# Patient Record
Sex: FEMALE | Race: BLACK OR AFRICAN AMERICAN | Employment: OTHER | URBAN - METROPOLITAN AREA
[De-identification: names, ages, dates, MRNs, and addresses within clinical notes are randomized per-mention and may not be internally consistent; named-entity substitution may affect disease eponyms.]

---

## 2017-02-17 RX ORDER — ATORVASTATIN CALCIUM 10 MG/1
TABLET, FILM COATED ORAL
Qty: 90 TAB | Refills: 1 | Status: SHIPPED | OUTPATIENT
Start: 2017-02-17 | End: 2017-11-20 | Stop reason: SDUPTHER

## 2017-03-17 ENCOUNTER — TELEPHONE (OUTPATIENT)
Dept: INTERNAL MEDICINE CLINIC | Age: 72
End: 2017-03-17

## 2017-03-17 NOTE — TELEPHONE ENCOUNTER
Attempted to reach patient regarding whether she had flu shot this season or declined to receive it. Requested she call back with information.

## 2017-03-23 ENCOUNTER — OFFICE VISIT (OUTPATIENT)
Dept: CARDIOLOGY CLINIC | Age: 72
End: 2017-03-23

## 2017-03-23 VITALS
OXYGEN SATURATION: 98 % | SYSTOLIC BLOOD PRESSURE: 120 MMHG | DIASTOLIC BLOOD PRESSURE: 80 MMHG | HEIGHT: 67 IN | RESPIRATION RATE: 16 BRPM | HEART RATE: 96 BPM | WEIGHT: 182 LBS | BODY MASS INDEX: 28.56 KG/M2

## 2017-03-23 DIAGNOSIS — I05.9 MITRAL VALVE DISORDER: ICD-10-CM

## 2017-03-23 DIAGNOSIS — I10 ESSENTIAL HYPERTENSION, BENIGN: ICD-10-CM

## 2017-03-23 DIAGNOSIS — I27.20 MILD PULMONARY HYPERTENSION (HCC): ICD-10-CM

## 2017-03-23 DIAGNOSIS — I42.2 APICAL VARIANT HYPERTROPHIC CARDIOMYOPATHY (HCC): Primary | ICD-10-CM

## 2017-03-23 DIAGNOSIS — I50.32 CHRONIC DIASTOLIC HEART FAILURE (HCC): ICD-10-CM

## 2017-03-23 DIAGNOSIS — I48.19 PERSISTENT ATRIAL FIBRILLATION (HCC): ICD-10-CM

## 2017-03-23 RX ORDER — CARVEDILOL 6.25 MG/1
6.25 TABLET ORAL 2 TIMES DAILY WITH MEALS
Qty: 60 TAB | Refills: 2 | Status: SHIPPED | OUTPATIENT
Start: 2017-03-23 | End: 2017-06-25 | Stop reason: SDUPTHER

## 2017-03-23 NOTE — PROGRESS NOTES
Destiny Lin     1945       Vipmaury Hebert MD, [facility] [place]  Date of Visit-3/23/2017   PCP is Sherie Canela MD   [facility] [place]  [facility]  HPI:  Destiny Lin is a 66 y.o. female     Follow up of apical variant hypertrophic CM and persistent AF. She is doing well currently. She reports occasional brief episodes of elevated heart beats with accompanying weakness. These occur approximately twice per month and only last for a brief moment. Denies chest pain, edema, syncope or shortness of breath at rest, palpitations or sense of arrhythmia. EKG:     Assessment/Plan:       Apical hypertrophy  -previous MRI   -probably would benefit from BB    Persistent AF  -with some breakthrough episodes of tachycardia   -amiodarone did not work  -continue [facility]  -low dose digoxin because of HCM  -because of the tachycardia will coreg 6.25 mg BID    diastolic dysfunction congestive heart failure chronic Patient seems to be NYHA Class 1 ( Ordinary physical activity does not cause undue fatigue, dyspnea, palpitations or anginal pain.)   HTN at goal    PLAN:  Start Carvedilol 6.25 mg twice daily  Follow up with Dr. Kamille Hebert in 1 month with an echocardiogram same day  Future Appointments  Date Time Provider Franchesca Dawni   4/27/2017 1:00 PM [facility] [address] [facility]   4/27/2017 1:20 PM Ramesh Mistry  [address]   5/17/2017 2:30 PM Sherie Canela MD [address] [facility]   11/13/2017 2:40 PM Dorie Schlatter, MD [facility] KYLE SCHED       Impression:   1. Apical variant hypertrophic cardiomyopathy ([address]    2. Chronic diastolic heart failure (HCC)    3. Persistent atrial fibrillation ([address]    4. Essential hypertension, benign    5. Mitral valve disorder    6.  Mild pulmonary hypertension (HCC)       Cardiac History:   Chronic atrial fibrillation   Diastolic CHF    ECHO 0-29-81= ef 55-60%, biatrial enlargement  ECHO 3- EF 70%, AK of apex with apical hypertrophy, mild TR,LAE, Pulm Htn    Admit Jan 2012 with asthma and May 2013; Chronic airway dz    Cardiac MRI     1. Apical hypertrophic cardiomyopathy. The apical septal wall measures 20   mm. The apical lateral and mid inferolateral wall measures 26 mm. Complete   obliteration of the LV apical cavity. [place] like shape of the LV cavity   typical of the apical hypertrophic cardiomyopathy. Hyperdynamic left   ventricular systolic function with end-systolic cavity obliteration. 3-D   LVEF 86%. There is no systolic anterior motion of the mitral apparatus or no   LV outflow tract obstruction. 2. Normal right ventricular size and systolic function. RVEF 60%. 3. Mild 1+ mitral regurgitation. 4. Moderate 2+ tricuspid regurgitation. 5. Normal rest myocardial perfusion on first pass perfusion imaging. 6. On EGE and LGE imaging, there is no areas myocardial enhancement to   suggest any myocardial scar, recent or old myocardial infarction,   infiltration or inflammation. There is no features of myocardial sarcoidosis   or amyloidosis. There is no features of inflammatory cardiomyopathy such as   myocarditis. 7. Normal pleura and pericardium. Trace anterior and posterior pericardial   effusion. 8. Markedly dilated RA and LA. ROS-except as noted above. . A complete cardiac and respiratory are reviewed and negative except as above ; Resp-denies wheezing  or productive cough,.  Const- No unusual weight loss or fever; Neuro-no recent seizure or CVA ; GI- No BRBPR, abdom pain, bloating ; - no  hematuria   see supplement sheet, initialed and to be scanned by staff  Past Medical History:   Diagnosis Date    Apical variant hypertrophic cardiomyopathy ([address]     Chronic diastolic heart failure ([address]     HTN (hypertension)     Hx of mammogram     LVH (left ventricular hypertrophy) due to hypertensive disease     Mild pulmonary hypertension ([address] 9/13/2012    Obstructive sleep apnea (adult) (pediatric) 07-    AHI: 5.0 per hour    Pneumonia 2013 admit [address] Hx= reports that she quit smoking about 21 years ago. She has a 2.00 pack-year smoking history. She has never used smokeless tobacco. She reports that she does not drink alcohol or use illicit drugs. Exam and Labs:    Visit Vitals    /80 (BP 1 Location: Left arm, BP Patient Position: Sitting)    Pulse 96    Resp 16    Ht 5' 7\" (1.702 m)    Wt 182 lb (82.6 kg)    SpO2 98%    BMI 28.51 kg/m2      Constitutional:  NAD, comfortable  Head: NC,AT. Eyes: No scleral icterus. Neck:  Neck supple. No JVD present. Throat: moist mucous membranes. Chest: Effort normal & normal respiratory excursion . Neurological: alert, conversant and oriented . Skin: Skin is not cold. No obvious systemic rash noted. Not diaphoretic. No erythema. Psychiatric:  Grossly normal mood and affect. Behavior appears normal. Extremities:  no clubbing or cyanosis. Abdomen: non distended    Lungs:breath sounds normal. No stridor. distress, wheezes or  Rales. Heart:    Irregularly irregular, normal S1, S2, no murmurs, rubs, clicks or gallops , PMI non displaced. Edema: Edema is none. Lab Results   Component Value Date/Time    Cholesterol, total 111 05/27/2016 09:33 AM    HDL Cholesterol 38 05/27/2016 09:33 AM    LDL, calculated 56 05/27/2016 09:33 AM    Triglyceride 85 05/27/2016 09:33 AM    CHOL/HDL Ratio 5.1 05/12/2013 04:20 AM     No results found for this or any previous visit.    Lab Results   Component Value Date/Time    Sodium 143 11/09/2016 01:15 PM    Potassium 4.0 11/09/2016 01:15 PM    Chloride 100 11/09/2016 01:15 PM    CO2 28 11/09/2016 01:15 PM    Anion gap 6 05/15/2013 07:10 AM    Glucose 99 11/09/2016 01:15 PM    BUN 15 11/09/2016 01:15 PM    Creatinine 0.67 11/09/2016 01:15 PM    BUN/Creatinine ratio 22 11/09/2016 01:15 PM    GFR est  11/09/2016 01:15 PM    GFR est non-AA 87 11/09/2016 01:15 PM    Calcium 9.5 11/09/2016 01:15 PM      Wt Readings from Last 3 Encounters:   03/23/17 182 lb (82.6 kg)   11/18/16 179 lb (81.2 kg)   11/10/16 179 lb (81.2 kg)      BP Readings from Last 3 Encounters:   03/23/17 120/80   11/18/16 124/88   11/10/16 124/82        Current Outpatient Prescriptions   Medication Sig    atorvastatin (LIPITOR) 10 mg tablet TAKE 1 TAB BY MOUTH DAILY.  valsartan-hydrochlorothiazide (DIOVAN-HCT) 160-25 mg per tablet TAKE 1 TABLET BY MOUTH EVERY DAY    ELIQUIS 5 mg tablet TAKE 1 TAB BY MOUTH TWO (2) TIMES A DAY.  ADVAIR DISKUS 250-50 mcg/dose diskus inhaler INHALE 1 PUFF TWICE A DAY    digoxin (LANOXIN) 0.125 mg tablet TAKE 1 TABLET BY MOUTH EVERY DAY    albuterol-ipratropium (DUO-NEB) 2.5 mg-0.5 mg/3 ml nebu 3 mL by Nebulization route four (4) times daily. (Patient taking differently: 3 mL by Nebulization route as needed.)    albuterol (PROVENTIL HFA, VENTOLIN HFA, PROAIR HFA) 90 mcg/actuation inhaler Take 1 Puff by inhalation every six (6) hours as needed for Wheezing.  triamcinolone acetonide (KENALOG) 0.1 % topical cream APPLY A THIN LAYER TO AFFECTED AREA(S) AS DIRECTED TWICE A DAY    furosemide (LASIX) 20 mg tablet 1 tablet daily (Patient taking differently: 20 mg as needed. 1 tablet daily)    tiotropium (SPIRIVA) 18 mcg inhalation capsule Take 1 Cap by inhalation daily. (Patient taking differently: Take 1 Cap by inhalation as needed.)    aspirin 81 mg tablet Take 1 Tab by mouth daily.  cpap machine kit by Does Not Apply route nightly.  multivitamin (ONE A DAY) tablet Take 1 Tab by mouth daily. No current facility-administered medications for this visit. Impression see above.     Written by Bharati Guerra, as dictated by Ramesh Mistry MD.

## 2017-03-23 NOTE — MR AVS SNAPSHOT
Visit Information Date & Time Provider Department Dept. Phone Encounter #  
 3/23/2017  2:40 PM Ramesh Mistry MD [facility] [phone] [id number] Your Appointments 5/17/2017  2:30 PM  
PHYSICAL with Eliseo Doss MD  
[facility] [place] [address] Internal Medicine [address]) Appt Note: wellness [address] [facility] [address] Critical access hospital [address]  
[address] [place] [address]  
  
    
 11/13/2017  2:40 PM  
Any with [place]  
[address] [facility] ([address]) Appt Note: Yearly F/U, bring machine. [address] Critical access hospital [address] [facility]  
  
   
 [address] [id number] Upcoming Health Maintenance Date Due FOBT Q 1 YEAR, 18+ 6/8/1961 DTaP/Tdap/Td series (1 - Tdap) 6/8/1964 ZOSTER VACCINE AGE 60> 6/8/2003 INFLUENZA AGE 9 TO ADULT 8/1/2016 MEDICARE YEARLY EXAM 11/6/2016 BREAST CANCER SCRN MAMMOGRAM 11/17/2016 Pneumococcal 65+ Low/Medium Risk (2 of 2 - PPSV23) 1/27/2017 HEMOGLOBIN A1C Q6M 5/9/2017 LIPID PANEL Q1 5/27/2017 EYE EXAM RETINAL OR DILATED Q1 6/23/2017 FOOT EXAM Q1 11/9/2017 MICROALBUMIN Q1 11/9/2017 GLAUCOMA SCREENING Q2Y 6/23/2018 COLONOSCOPY 11/6/2025 Allergies as of 3/23/2017  Review Complete On: 3/23/2017 By: David Aquino Severity Noted Reaction Type Reactions Cetirizine Medium 10/18/2016    Hives Other reaction(s): Hives Amlodipine  01/03/2014   Side Effect Swelling  
 lower extremity swelling Coreg [Carvedilol]  04/29/2014   Side Effect Other (comments)  
 sweating Erythromycin  09/29/2010    Hives Verapamil Low 01/31/2012   Not Verified Other (comments) Patient unable to take verapamil for rate control secondary to peripheral edema/maliase/feeling of being high. Current Immunizations  Reviewed on 11/18/2016 Name Date Influenza High Dose Vaccine PF 11/6/2015 Influenza Vaccine Split 1/28/2012 12:03 AM, 3/31/2011  2:45 AM  
 Pneumococcal Vaccine (Unspecified Type) 1/27/2012 11:56 PM  
  
 Not reviewed this visit You Were Diagnosed With   
  
 Codes Comments Apical variant hypertrophic cardiomyopathy ([facility]    -  Primary ICD-10-CM: I42.2 ICD-9-CM: 425.4 Vitals BP Pulse Resp Height(growth percentile) Weight(growth percentile) SpO2  
 120/80 (BP 1 Location: Left arm, BP Patient Position: Sitting) 96 16 5' 7\" (1.702 m) 182 lb (82.6 kg) 98% BMI OB Status Smoking Status 28.51 kg/m2 Hysterectomy Former Smoker Vitals History BMI and BSA Data Body Mass Index Body Surface Area 28.51 kg/m 2 1.98 m 2 Preferred Pharmacy Pharmacy Name Phone [id number] [phone] Your Updated Medication List  
  
   
This list is accurate as of: 3/23/17  3:37 PM.  Always use your most recent med list.  
  
  
  
  
 Boyd Waite 250-50 mcg/dose diskus inhaler Generic drug:  fluticasone-salmeterol INHALE 1 PUFF TWICE A DAY  
  
 albuterol 90 mcg/actuation inhaler Commonly known as:  PROVENTIL HFA, VENTOLIN HFA, PROAIR HFA Take 1 Puff by inhalation every six (6) hours as needed for Wheezing. albuterol-ipratropium 2.5 mg-0.5 mg/3 ml Nebu Commonly known as:  DUO-NEB  
3 mL by Nebulization route four (4) times daily. aspirin 81 mg tablet Take 1 Tab by mouth daily. atorvastatin 10 mg tablet Commonly known as:  LIPITOR  
TAKE 1 TAB BY MOUTH DAILY. carvedilol 6.25 mg tablet Commonly known as:  Urban Shonna Take 1 Tab by mouth two (2) times daily (with meals). cpap machine kit  
by Does Not Apply route nightly. digoxin 0.125 mg tablet Commonly known as:  LANOXIN  
TAKE 1 TABLET BY MOUTH EVERY DAY  
  
 ELIQUIS 5 mg tablet Generic drug:  apixaban TAKE 1 TAB BY MOUTH TWO (2) TIMES A DAY. furosemide 20 mg tablet Commonly known as:  LASIX  
1 tablet daily  
  
 multivitamin tablet Commonly known as:  ONE A DAY Take 1 Tab by mouth daily. tiotropium 18 mcg inhalation capsule Commonly known as:  Roni Brady Take 1 Cap by inhalation daily. triamcinolone acetonide 0.1 % topical cream  
Commonly known as:  KENALOG  
APPLY A THIN LAYER TO AFFECTED AREA(S) AS DIRECTED TWICE A DAY  
  
 valsartan-hydroCHLOROthiazide 160-25 mg per tablet Commonly known as:  DIOVAN-HCT  
TAKE 1 TABLET BY MOUTH EVERY DAY Prescriptions Sent to Pharmacy Refills  
 carvedilol (COREG) 6.25 mg tablet 2 Sig: Take 1 Tab by mouth two (2) times daily (with meals). Class: Normal  
 Pharmacy: [address] [place] [address] #: [phone] Route: Oral  
  
To-Do List   
 03/23/2017 ECHO:  2D ECHO COMPLETE ADULT (TTE) W OR WO CONTR Patient Instructions Start Carvedilol 6.25 mg twice daily Follow up with Dr. Zoila Link in 1 month with an echocardiogram same day Introducing [facility]! Hien Martinez introduces [employer] patient portal. Now you can access parts of your medical record, email your doctor's office, and request medication refills online. 1. In your internet browser, go to [email or web address] 2. Click on the First Time User? Click Here link in the Sign In box. You will see the New Member Sign Up page. 3. Enter your [employer] Access Code exactly as it appears below. You will not need to use this code after youve completed the sign-up process. If you do not sign up before the expiration date, you must request a new code. · [employer] Access Code: [email or web address] Expires: 6/21/2017  3:37 PM 
 
4. Enter the last four digits of your Social Security Number (xxxx) and Date of Birth (mm/dd/yyyy) as indicated and click Submit. You will be taken to the next sign-up page. 5. Create a [employer] ID. This will be your [employer] login ID and cannot be changed, so think of one that is secure and easy to remember. 6. Create a [employer] password. You can change your password at any time. 7. Enter your Password Reset Question and Answer. This can be used at a later time if you forget your password. 8. Enter your e-mail address. You will receive e-mail notification when new information is available in [address] 9. Click Sign Up. You can now view and download portions of your medical record. 10. Click the Download Summary menu link to download a portable copy of your medical information. If you have questions, please visit the Frequently Asked Questions section of the [employer] website. Remember, [employer] is NOT to be used for urgent needs. For medical emergencies, dial 911. Now available from your iPhone and Android! Please provide this summary of care documentation to your next provider. Your primary care clinician is listed as Garett Bowles. If you have any questions after today's visit, please call [phone].

## 2017-03-23 NOTE — PATIENT INSTRUCTIONS
Start Carvedilol 6.25 mg twice daily    Follow up with Dr. Raymond Millering in 1 month with an echocardiogram same day

## 2017-03-28 RX ORDER — VALSARTAN AND HYDROCHLOROTHIAZIDE 160; 25 MG/1; MG/1
TABLET ORAL
Qty: 90 TAB | Refills: 1 | Status: SHIPPED | OUTPATIENT
Start: 2017-03-28 | End: 2017-10-07 | Stop reason: SDUPTHER

## 2017-04-26 RX ORDER — DIGOXIN 125 MCG
TABLET ORAL
Qty: 30 TAB | Refills: 9 | Status: SHIPPED | OUTPATIENT
Start: 2017-04-26 | End: 2018-03-01 | Stop reason: SDUPTHER

## 2017-04-27 ENCOUNTER — OFFICE VISIT (OUTPATIENT)
Dept: CARDIOLOGY CLINIC | Age: 72
End: 2017-04-27

## 2017-04-27 ENCOUNTER — CLINICAL SUPPORT (OUTPATIENT)
Dept: CARDIOLOGY CLINIC | Age: 72
End: 2017-04-27

## 2017-04-27 VITALS
WEIGHT: 184 LBS | SYSTOLIC BLOOD PRESSURE: 140 MMHG | HEIGHT: 67 IN | DIASTOLIC BLOOD PRESSURE: 90 MMHG | BODY MASS INDEX: 28.88 KG/M2

## 2017-04-27 DIAGNOSIS — I05.9 MITRAL VALVE DISORDER: ICD-10-CM

## 2017-04-27 DIAGNOSIS — I10 ESSENTIAL HYPERTENSION, BENIGN: ICD-10-CM

## 2017-04-27 DIAGNOSIS — I50.32 CHRONIC DIASTOLIC HEART FAILURE (HCC): ICD-10-CM

## 2017-04-27 DIAGNOSIS — I42.2 APICAL VARIANT HYPERTROPHIC CARDIOMYOPATHY (HCC): ICD-10-CM

## 2017-04-27 DIAGNOSIS — J41.0 SIMPLE CHRONIC BRONCHITIS (HCC): ICD-10-CM

## 2017-04-27 DIAGNOSIS — I27.20 MILD PULMONARY HYPERTENSION (HCC): ICD-10-CM

## 2017-04-27 DIAGNOSIS — I42.2 APICAL VARIANT HYPERTROPHIC CARDIOMYOPATHY (HCC): Primary | ICD-10-CM

## 2017-04-27 DIAGNOSIS — I48.19 PERSISTENT ATRIAL FIBRILLATION (HCC): ICD-10-CM

## 2017-04-27 NOTE — PROGRESS NOTES
Nikita Shipley     1945       Dejon Delaney MD, [facility] [place]  Date of Visit-4/27/2017   PCP is Christos Morales MD   [facility] [place]  [facility]  HPI:  Nikita Shipley is a 66 y.o. female   Follow up of apical  hypertrophy. Echo today shows good LV function at 70% on preliminary with apical hypertrophy unchanged. It is reviewed with the patient. Gets brief SOB. She has persistent AF. Started on carvedilol last visit a month ago. She takes her lasix rarely  Denies chest pain, edema, syncope or shortness of breath at rest, has no tachycardia, palpitations or sense of arrhythmia. Assessment/Plan:     Apical hypertrophy cardiomyopathy  -as seen on previous MRI and echo today   -started Coreg at last visit   -EF was 86% on MRI previously  -there was no BUSTER and no scar     Persistent AF  -no further tachycardia   -amiodarone did not work   -Coreg with low dose digoxin, is doing well  -continue oral anticoagulant to prevent stroke with the Eliquis    Mild diastolic dysfunction   -compensated on Lasix as needed   HTN  -at goal   Follow up in 6 months   Future Appointments  Date Time Provider Franchesca Gretchen   5/17/2017 2:30 PM Christos Morales MD [address] [facility]   5/19/2017 10:00 AM [facility] [address]   10/26/2017 1:20 PM Lady Zafar  [address]   11/13/2017 2:40 PM Angelina Cole MD [facility] KYLE SCHED       Impression:   1. Apical variant hypertrophic cardiomyopathy ([address]    2. Chronic diastolic heart failure (HCC)    3. Persistent atrial fibrillation ([address]    4. Essential hypertension, benign    5. Mitral valve disorder    6. Mild pulmonary hypertension ([address]    7.  Simple chronic bronchitis (HCC)       Cardiac History:   Chronic atrial fibrillation   Diastolic CHF    ECHO 5-40-60= ef 55-60%, biatrial enlargement  ECHO 3- EF 70%, AK of apex with apical hypertrophy, mild TR,LAE, Pulm Htn    Admit Jan 2012 with asthma and May 2013; Chronic airway dz    Cardiac MRI     1. Apical hypertrophic cardiomyopathy. The apical septal wall measures 20   mm. The apical lateral and mid inferolateral wall measures 26 mm. Complete   obliteration of the LV apical cavity. [place] like shape of the LV cavity   typical of the apical hypertrophic cardiomyopathy. Hyperdynamic left   ventricular systolic function with end-systolic cavity obliteration. 3-D   LVEF 86%. There is no systolic anterior motion of the mitral apparatus or no   LV outflow tract obstruction. 2. Normal right ventricular size and systolic function. RVEF 60%. 3. Mild 1+ mitral regurgitation. 4. Moderate 2+ tricuspid regurgitation. 5. Normal rest myocardial perfusion on first pass perfusion imaging. 6. On EGE and LGE imaging, there is no areas myocardial enhancement to   suggest any myocardial scar, recent or old myocardial infarction,   infiltration or inflammation. There is no features of myocardial sarcoidosis   or amyloidosis. There is no features of inflammatory cardiomyopathy such as   myocarditis. 7. Normal pleura and pericardium. Trace anterior and posterior pericardial   effusion. 8. Markedly dilated RA and LA. ROS-except as noted above. . A complete cardiac and respiratory are reviewed and negative except as above ; Resp-denies wheezing  or productive cough,.  Const- No unusual weight loss or fever; Neuro-no recent seizure or CVA ; GI- No BRBPR, abdom pain, bloating ; - no  hematuria   see supplement sheet, initialed and to be scanned by staff  Past Medical History:   Diagnosis Date    Apical variant hypertrophic cardiomyopathy ([address]     Chronic diastolic heart failure ([address]     HTN (hypertension)     Hx of mammogram     LVH (left ventricular hypertrophy) due to hypertensive disease     Mild pulmonary hypertension ([address] 9/13/2012    Obstructive sleep apnea (adult) (pediatric) 07-    AHI: 5.0 per hour    Pneumonia 2013 admit [facility]      Social Hx= reports that she quit smoking about 21 years ago. She has a 2.00 pack-year smoking history. She has never used smokeless tobacco. She reports that she does not drink alcohol or use illicit drugs. Exam and Labs:    Visit Vitals    /90    Ht 5' 7\" (1.702 m)    Wt 184 lb (83.5 kg)    BMI 28.82 kg/m2      Constitutional:  NAD, comfortable  Head: NC,AT. Eyes: No scleral icterus. Neck:  Neck supple. No JVD present. Throat: moist mucous membranes. Chest: Effort normal & normal respiratory excursion . Neurological: alert, conversant and oriented . Skin: Skin is not cold. No obvious systemic rash noted. Not diaphoretic. No erythema. Psychiatric:  Grossly normal mood and affect. Behavior appears normal. Extremities:  no clubbing or cyanosis. Abdomen: non distended    Lungs:breath sounds normal. No stridor. distress, wheezes or  Rales. Heart:    Irregularly irregular  normal S1, S2, no murmurs, rubs, clicks or gallops , PMI non displaced. Edema: Edema is none. Lab Results   Component Value Date/Time    Cholesterol, total 111 05/27/2016 09:33 AM    HDL Cholesterol 38 05/27/2016 09:33 AM    LDL, calculated 56 05/27/2016 09:33 AM    Triglyceride 85 05/27/2016 09:33 AM    CHOL/HDL Ratio 5.1 05/12/2013 04:20 AM     No results found for this or any previous visit.    Lab Results   Component Value Date/Time    Sodium 143 11/09/2016 01:15 PM    Potassium 4.0 11/09/2016 01:15 PM    Chloride 100 11/09/2016 01:15 PM    CO2 28 11/09/2016 01:15 PM    Anion gap 6 05/15/2013 07:10 AM    Glucose 99 11/09/2016 01:15 PM    BUN 15 11/09/2016 01:15 PM    Creatinine 0.67 11/09/2016 01:15 PM    BUN/Creatinine ratio 22 11/09/2016 01:15 PM    GFR est  11/09/2016 01:15 PM    GFR est non-AA 87 11/09/2016 01:15 PM    Calcium 9.5 11/09/2016 01:15 PM      Wt Readings from Last 3 Encounters:   04/27/17 184 lb (83.5 kg)   03/23/17 182 lb (82.6 kg)   11/18/16 179 lb (81.2 kg)      BP Readings from Last 3 Encounters:   04/27/17 140/90 03/23/17 120/80   11/18/16 124/88        Current Outpatient Prescriptions   Medication Sig    digoxin (LANOXIN) 0.125 mg tablet TAKE 1 TABLET BY MOUTH EVERY DAY    valsartan-hydroCHLOROthiazide (DIOVAN-HCT) 160-25 mg per tablet TAKE 1 TABLET BY MOUTH EVERY DAY    carvedilol (COREG) 6.25 mg tablet Take 1 Tab by mouth two (2) times daily (with meals).  atorvastatin (LIPITOR) 10 mg tablet TAKE 1 TAB BY MOUTH DAILY.  ELIQUIS 5 mg tablet TAKE 1 TAB BY MOUTH TWO (2) TIMES A DAY.  ADVAIR DISKUS 250-50 mcg/dose diskus inhaler INHALE 1 PUFF TWICE A DAY    albuterol-ipratropium (DUO-NEB) 2.5 mg-0.5 mg/3 ml nebu 3 mL by Nebulization route four (4) times daily. (Patient taking differently: 3 mL by Nebulization route as needed.)    albuterol (PROVENTIL HFA, VENTOLIN HFA, PROAIR HFA) 90 mcg/actuation inhaler Take 1 Puff by inhalation every six (6) hours as needed for Wheezing.  triamcinolone acetonide (KENALOG) 0.1 % topical cream APPLY A THIN LAYER TO AFFECTED AREA(S) AS DIRECTED TWICE A DAY    furosemide (LASIX) 20 mg tablet 1 tablet daily (Patient taking differently: 20 mg as needed. 1 tablet daily)    tiotropium (SPIRIVA) 18 mcg inhalation capsule Take 1 Cap by inhalation daily. (Patient taking differently: Take 1 Cap by inhalation as needed.)    aspirin 81 mg tablet Take 1 Tab by mouth daily.  cpap machine kit by Does Not Apply route nightly.  multivitamin (ONE A DAY) tablet Take 1 Tab by mouth daily. No current facility-administered medications for this visit. Impression see above.     Written by Mumtaz Arreguin, as dictated by Hernando Navarro MD.

## 2017-05-15 RX ORDER — APIXABAN 5 MG/1
TABLET, FILM COATED ORAL
Qty: 60 TAB | Refills: 5 | Status: SHIPPED | OUTPATIENT
Start: 2017-05-15 | End: 2017-12-21 | Stop reason: SDUPTHER

## 2017-05-17 ENCOUNTER — OFFICE VISIT (OUTPATIENT)
Dept: INTERNAL MEDICINE CLINIC | Age: 72
End: 2017-05-17

## 2017-05-17 VITALS
HEART RATE: 100 BPM | WEIGHT: 182 LBS | HEIGHT: 63 IN | OXYGEN SATURATION: 97 % | RESPIRATION RATE: 16 BRPM | TEMPERATURE: 98.1 F | BODY MASS INDEX: 32.25 KG/M2 | SYSTOLIC BLOOD PRESSURE: 120 MMHG | DIASTOLIC BLOOD PRESSURE: 76 MMHG

## 2017-05-17 DIAGNOSIS — K42.9 UMBILICAL HERNIA WITHOUT OBSTRUCTION AND WITHOUT GANGRENE: ICD-10-CM

## 2017-05-17 DIAGNOSIS — Z23 ENCOUNTER FOR IMMUNIZATION: ICD-10-CM

## 2017-05-17 DIAGNOSIS — Z00.00 MEDICARE ANNUAL WELLNESS VISIT, SUBSEQUENT: Primary | ICD-10-CM

## 2017-05-17 DIAGNOSIS — G47.33 OSA (OBSTRUCTIVE SLEEP APNEA): ICD-10-CM

## 2017-05-17 DIAGNOSIS — Z71.89 ACP (ADVANCE CARE PLANNING): ICD-10-CM

## 2017-05-17 DIAGNOSIS — J41.0 SIMPLE CHRONIC BRONCHITIS (HCC): ICD-10-CM

## 2017-05-17 DIAGNOSIS — E11.9 CONTROLLED TYPE 2 DIABETES MELLITUS WITHOUT COMPLICATION, WITHOUT LONG-TERM CURRENT USE OF INSULIN (HCC): ICD-10-CM

## 2017-05-17 DIAGNOSIS — I48.19 PERSISTENT ATRIAL FIBRILLATION (HCC): ICD-10-CM

## 2017-05-17 DIAGNOSIS — Z13.39 SCREENING FOR ALCOHOLISM: ICD-10-CM

## 2017-05-17 RX ORDER — TRIAMCINOLONE ACETONIDE 1 MG/G
CREAM TOPICAL
Qty: 30 G | Refills: 0 | Status: SHIPPED | OUTPATIENT
Start: 2017-05-17

## 2017-05-17 NOTE — PATIENT INSTRUCTIONS
Preventing Falls: Care Instructions  Your Care Instructions  Getting around your home safely can be a challenge if you have injuries or health problems that make it easy for you to fall. Loose rugs and furniture in walkways are among the dangers for many older people who have problems walking or who have poor eyesight. People who have conditions such as arthritis, osteoporosis, or dementia also have to be careful not to fall. You can make your home safer with a few simple measures. Follow-up care is a key part of your treatment and safety. Be sure to make and go to all appointments, and call your doctor if you are having problems. It's also a good idea to know your test results and keep a list of the medicines you take. How can you care for yourself at home? Taking care of yourself  · You may get dizzy if you do not drink enough water. To prevent dehydration, drink plenty of fluids, enough so that your urine is light yellow or clear like water. Choose water and other caffeine-free clear liquids. If you have kidney, heart, or liver disease and have to limit fluids, talk with your doctor before you increase the amount of fluids you drink. · Exercise regularly to improve your strength, muscle tone, and balance. Walk if you can. Swimming may be a good choice if you cannot walk easily. · Have your vision and hearing checked each year or any time you notice a change. If you have trouble seeing and hearing, you might not be able to avoid objects and could lose your balance. · Know the side effects of the medicines you take. Ask your doctor or pharmacist whether the medicines you take can affect your balance. Sleeping pills or sedatives can affect your balance. · Limit the amount of alcohol you drink. Alcohol can impair your balance and other senses. · Ask your doctor whether calluses or corns on your feet need to be removed.  If you wear loose-fitting shoes because of calluses or corns, you can lose your balance and fall. · Talk to your doctor if you have numbness in your feet. Preventing falls at home  · Remove raised doorway thresholds, throw rugs, and clutter. Repair loose carpet or raised areas in the floor. · Move furniture and electrical cords to keep them out of walking paths. · Use nonskid floor wax, and wipe up spills right away, especially on ceramic tile floors. · If you use a walker or cane, put rubber tips on it. If you use crutches, clean the bottoms of them regularly with an abrasive pad, such as steel wool. · Keep your house well lit, especially [place], and outside walkways. Use night-lights in areas such as hallways and bathrooms. Add extra light switches or use remote switches (such as switches that go on or off when you clap your hands) to make it easier to turn lights on if you have to get up during the night. · Install sturdy handrails on stairways. · Move items in your cabinets so that the things you use a lot are on the lower shelves (about waist level). · Keep a cordless phone and a flashlight with new batteries by your bed. If possible, put a phone in each of the main rooms of your house, or carry a cell phone in case you fall and cannot reach a phone. Or, you can wear a device around your neck or wrist. You push a button that sends a signal for help. · Wear low-heeled shoes that fit well and give your feet good support. Use footwear with nonskid soles. Check the heels and soles of your shoes for wear. Repair or replace worn heels or soles. · Do not wear socks without shoes on wood floors. · Walk on the grass when the sidewalks are slippery. If you live in an area that gets snow and ice in the winter, sprinkle salt on slippery steps and sidewalks. Preventing falls in the bath  · Install grab bars and nonskid mats inside and outside your shower or tub and near the toilet and sinks. · Use shower chairs and bath benches.   · Use a hand-held shower head that will allow you to sit while showering. · Get into a tub or shower by putting the weaker leg in first. Get out of a tub or shower with your strong side first.  · Repair loose toilet seats and consider installing a raised toilet seat to make getting on and off the toilet easier. · Keep your bathroom door unlocked while you are in the shower. Where can you learn more? Go to http://brandy-giorgio.info/. Enter [id number] [id number] [phone] in the search box to learn more about \"Preventing Falls: Care Instructions. \"  Current as of: August 4, 2016  Content Version: 11.2  © 7448-1731 [employer] Care instructions adapted under license by [employer] (which disclaims liability or warranty for this information). If you have questions about a medical condition or this instruction, always ask your healthcare professional. [place] [address] any warranty or liability for your use of this information. Medicare Wellness Visit, Female    The best way to live healthy is to have a healthy lifestyle by eating a well-balanced diet, exercising regularly, limiting alcohol and stopping smoking. Regular physical exams and screening tests are another way to keep healthy. Preventive exams provided by your health care provider can find health problems before they become diseases or illnesses. Preventive services including immunizations, screening tests, monitoring and exams can help you take care of your own health. All people over age 72 should have a pneumovax  and and a prevnar shot to prevent pneumonia. These are once in a lifetime unless you and your provider decide differently. All people over 65 should have a yearly flu shot and a tetanus vaccine every 10 years. A bone mass density to screen for osteoporosis or thinning of the bones should be done every 2 years after 65. Screening for diabetes mellitus with a blood sugar test should be done every year.     Glaucoma is a disease of the eye due to increased ocular pressure that can lead to blindness and it should be done every year by an eye professional.    Cardiovascular screening tests that check for elevated lipids (fatty part of blood) which can lead to heart disease and strokes should be done every 5 years. Colorectal screening that evaluates for blood or polyps in your colon should be done yearly as a stool test or every five years as a flexible sigmoidoscope or every 10 years as a colonoscopy up to age 76. Breast cancer screening with a mammogram is recommended biennially  for women age 54-69. Screening for cervical cancer with a pap smear and pelvic exam is recommended for women after age 72 years every 2 years up to age 79 or when the provider and patient decide to stop. If there is a history of cervical abnormalities or other increased risk for cancer then the test is recommended yearly. Hepatitis C screening is also recommended for anyone born between [birth year] through [address]. A shingles vaccine is also recommended once in a lifetime after age 61. Your Medicare Wellness Exam is recommended annually. Here is a list of your current Health Maintenance items with a due date:  Health Maintenance Due   Topic Date Due    Stool testing for trace blood  06/08/1961    DTaP/Tdap/Td  (1 - Tdap) 06/08/1964    Shingles Vaccine  06/08/2003    Annual Well Visit  11/06/2016    Breast Cancer Screening  11/17/2016    Pneumococcal Vaccine (2 of 2 - PPSV23) 01/27/2017    Hemoglobin A1C    05/09/2017    Cholesterol Test   05/27/2017            Advance Directives: Care Instructions  Your Care Instructions  An advance directive is a legal way to state your wishes at the end of your life. It tells your family and your doctor what to do if you can no longer say what you want. There are two main types of advance directives. You can change them any time that your wishes change.   · A living will tells your family and your doctor your wishes about life support and other treatment. · A durable power of  for health care lets you name a person to make treatment decisions for you when you can't speak for yourself. This person is called a health care agent. If you do not have an advance directive, decisions about your medical care may be made by a doctor or a  who doesn't know you. It may help to think of an advance directive as a gift to the people who care for you. If you have one, they won't have to make tough decisions by themselves. Follow-up care is a key part of your treatment and safety. Be sure to make and go to all appointments, and call your doctor if you are having problems. It's also a good idea to know your test results and keep a list of the medicines you take. How can you care for yourself at home? · Discuss your wishes with your loved ones and your doctor. This way, there are no surprises. · Many states have a unique form. Or you might use a universal form that has been approved by many states. This kind of form can sometimes be completed and stored online. Your electronic copy will then be available wherever you have a connection to the Internet. In most cases, doctors will respect your wishes even if you have a form from a different state. · You don't need a  to do an advance directive. But you may want to get legal advice. · Think about these questions when you prepare an advance directive:  ¨ Who do you want to make decisions about your medical care if you are not able to? Many people choose a family member or close friend. ¨ Do you know enough about life support methods that might be used? If not, talk to your doctor so you understand. ¨ What are you most afraid of that might happen? You might be afraid of having pain, losing your independence, or being kept alive by machines. ¨ Where would you prefer to die? Choices include your home, a hospital, or a nursing home.   ¨ Would you like to have information about hospice care to support you and your family? ¨ Do you want to donate organs when you die? ¨ Do you want certain Restoration practices performed before you die? If so, put your wishes in the advance directive. · Read your advance directive every year, and make changes as needed. When should you call for help? Be sure to contact your doctor if you have any questions. Where can you learn more? Go to http://brandy-giorgio.info/. Enter R264 in the search box to learn more about \"Advance Directives: Care Instructions. \"  Current as of: November 17, 2016  Content Version: 11.2  © 1506-2169 [employer]. Care instructions adapted under license by [employer] (which disclaims liability or warranty for this information). If you have questions about a medical condition or this instruction, always ask your healthcare professional. [address] any warranty or liability for your use of this information.

## 2017-05-17 NOTE — PROGRESS NOTES
Dwayne Monroy is a 66 y.o. female who was seen in clinic today (5/17/2017) for a full physical.      Assessment & Plan:   Dave Smith was seen today for complete physical.  Diagnoses and all orders for this visit:    Medicare annual wellness visit, subsequent    ACP (advance care planning)    Encounter for immunization  -     pneumococcal 13 allie conj dip (PREVNAR-13) 0.5 mL syrg injection; 0.5 mL by IntraMUSCular route once for 1 dose.  -     diph,Pertuss,Acell,,Tet Vac-PF (ADACEL) 2 Lf-(2.5-5-3-5 mcg)-5Lf/0.5 mL susp; 0.5 mL by IntraMUSCular route once for 1 dose.  -     varicella zoster vacine live (ZOSTAVAX) 19,400 unit/0.65 mL susr injection; 1 Vial by SubCUTAneous route once for 1 dose. Screening for alcoholism    Body mass index 32.0-32.9, adult    Simple chronic bronchitis ([facility] [address]- stable, continue current treatment     Controlled type 2 diabetes mellitus without complication, without long-term current use of insulin ([address]- well controlled, home glucose monitoring emphasized, long term diabetic complications discussed and no changes pending review of labs. -     METABOLIC PANEL, COMPREHENSIVE  -     HEMOGLOBIN A1C WITH EAG  -     LIPID PANEL  -      DIABETES FOOT EXAM    Persistent atrial fibrillation ([address]- stable, continue current treatment, she is worried about side effects w/ Coreg, recommended trying to decrease to 1/2 tab BID, update me or cardiologist if no changes. YOLANDA (obstructive sleep apnea)- stable, continue current treatment, defer to specialist     Umbilical hernia without obstruction and without gangrene- new dx, reviewed differential, reviewed surgery, reviewed red flags, she would like to monitor for now    Other orders  -     triamcinolone acetonide (KENALOG) 0.1 % topical cream; APPLY A THIN LAYER TO AFFECTED AREA(S) AS DIRECTED TWICE A DAY AS NEEDED         Follow-up Disposition:  Return in about 6 months (around 11/17/2017). ------------------------------------------------------------------------------------------    Subjective: Annual Wellness Visit- Subsequent Visit    End of Life Planning: This was discussed with her today and she has NO advanced directive  - add't info provided. Reviewed DNR/DNI and patient is not interested. Depression Screen:  PHQ over the last two weeks 5/17/2017   Little interest or pleasure in doing things Several days   Feeling down, depressed or hopeless Several days   Total Score PHQ 2 2         Fall Risk:   Fall Risk Assessment, last 12 mths 5/17/2017   Able to walk? Yes   Fall in past 12 months? Yes   Fall with injury? No   Number of falls in past 12 months 1   Fall Risk Score 1       Abuse Screen:  Abuse Screening Questionnaire 5/17/2017   Do you ever feel afraid of your partner? N   Are you in a relationship with someone who physically or mentally threatens you? N   Is it safe for you to go home? Y         Alcohol Risk Factor Screening: On any occasion during the past 3 months, have you had more than 3 drinks containing alcohol? No  Do you average more than 7 drinks per week? No    Hearing Loss:  denies any hearing loss    Activities of Daily Living:  Self-care. Requires assistance with: no ADLs    Adult Nutrition Screen:  No risk factors noted. Health Maintenance  Daily Aspirin: yes  Bone Density: UTD - done in 1/14 - normal  Glaucoma Screening: UTD    Immunizations:    Influenza: she will plan on getting it this fall. Tetanus: not UTD- script provided. Shingles: not UTD - script provided. Pneumonia: due for Prevnar & script provided. Cancer screening:    Cervical: n/a, reviewed guidelines. Breast: reviewed guidelines, reviewed SBE with her, UTD. Colon: overdue, has not had FIT testing done x 2, pt promises again to get it done, reviewed pros/cons, guidelines reviewed.          Patient Care Team:  Jayesh Matos MD as PCP - General (Internal Medicine)  Yeimi Elaine Jens, RN as [address] (Internal Medicine)  Ariana Hernández RN as [address] (Cardiology)  Lashon Leonardo MD (Cardiology)  Gerald Goldberg, MD (Sleep Medicine)  Catia Zimmer MD (Cardiology)     In addition to the above issues we also reviewed the following acute and/or chronic problems:  Pulmonary Review  The patient is being seen for COPD. Current limitations in activity: none. Coughing when present is described as none. Wheezing is not present. Regimen compliance: using as directed. She is not using albuterol.     Endocrine Review  She is seen for diabetes. Since last visit she reports no significant changes. She reports medication compliance: n/a - patient not on medications (diet controlled). Diabetic diet compliance: compliant all of the time. Lab review: labs reviewed and discussed with patient. Eye exam: UTD.     Cardiovascular Review  The patient has hypertension and hyperlipidemia. She reports taking medications as instructed, she reports some off balance feelign since starting coreg. Home BP monitoring in range of 296-301'P systolic over 54'H diastolic. Diet and Lifestyle: generally follows a low fat low cholesterol diet, generally follows a low sodium diet, sedentary. Lab review: labs reviewed and discussed with patient. The following sections were reviewed & updated as appropriate: PMH, PSH, FH, and SH. Patient Active Problem List   Diagnosis Code    Essential hypertension, benign I10    Atrial fibrillation (HCC) I48.91    Mitral valve disorder I05.9    COPD (chronic obstructive pulmonary disease) (HCC) J44.9    YOLANDA (obstructive sleep apnea) G47.33    Mild pulmonary hypertension (HCC) I27.2    Chronic diastolic heart failure (HCC) I50.32    Diabetes mellitus type 2, controlled ([facility] [address] E11.9    Apical variant hypertrophic cardiomyopathy ([facility] [address] I42.2          Prior to Admission medications    Medication Sig Start Date End Date Taking?  Authorizing Provider   ELIQUIS 5 mg tablet TAKE 1 TAB BY MOUTH TWO (2) TIMES A DAY. 5/15/17  Yes Jerrica Kearns MD   digoxin (LANOXIN) 0.125 mg tablet TAKE 1 TABLET BY MOUTH EVERY DAY 4/26/17  Yes Jerrica Kearns MD   valsartan-hydroCHLOROthiazide (DIOVAN-HCT) 160-25 mg per tablet TAKE 1 TABLET BY MOUTH EVERY DAY 3/28/17  Yes Jerrica Kearns MD   carvedilol (COREG) 6.25 mg tablet Take 1 Tab by mouth two (2) times daily (with meals). 3/23/17  Yes Jerrica Kearns MD   atorvastatin (LIPITOR) 10 mg tablet TAKE 1 TAB BY MOUTH DAILY. 2/17/17  Yes Saw Valle MD   ADVAIR DISKUS 250-50 mcg/dose diskus inhaler INHALE 1 PUFF TWICE A DAY 8/30/16  Yes Saw Valle MD   albuterol-ipratropium (DUO-NEB) 2.5 mg-0.5 mg/3 ml nebu 3 mL by Nebulization route four (4) times daily. Patient taking differently: 3 mL by Nebulization route as needed. 5/10/16  Yes Saw Valle MD   albuterol (PROVENTIL HFA, VENTOLIN HFA, PROAIR HFA) 90 mcg/actuation inhaler Take 1 Puff by inhalation every six (6) hours as needed for Wheezing. 4/19/16  Yes Saw Valle MD   triamcinolone acetonide (KENALOG) 0.1 % topical cream APPLY A THIN LAYER TO AFFECTED AREA(S) AS DIRECTED TWICE A DAY 8/28/14  Yes Saw Valle MD   furosemide (LASIX) 20 mg tablet 1 tablet daily  Patient taking differently: 20 mg as needed. 1 tablet daily 7/14/14  Yes Saw Valle MD   tiotropium [facility]) 18 mcg inhalation capsule Take 1 Cap by inhalation daily. Patient taking differently: Take 1 Cap by inhalation as needed. 7/14/14  Yes Saw Valle MD   aspirin 81 mg tablet Take 1 Tab by mouth daily. 1/9/14  Yes Saw Valle MD   cpap machine kit by Does Not Apply route nightly. Yes Historical Provider   multivitamin (ONE A DAY) tablet Take 1 Tab by mouth daily.    Yes Historical Provider          Allergies   Allergen Reactions    Cetirizine Hives     Other reaction(s): Hives    Amlodipine Swelling     lower extremity swelling    Coreg [Carvedilol] Other (comments)     sweating    Erythromycin Hives    Verapamil Other (comments)     Patient unable to take verapamil for rate control secondary to peripheral edema/maliase/feeling of being high. Review of Systems   Constitutional: Negative for chills and fever. Respiratory: Negative for cough and shortness of breath. Cardiovascular: Negative for chest pain and palpitations. Gastrointestinal: Negative for abdominal pain, blood in stool, constipation, diarrhea, heartburn, nausea and vomiting. She reports a bulge on the R abdomen, present for years, maybe slightly worse   Musculoskeletal: Negative for joint pain and myalgias. Neurological: Positive for dizziness. Negative for tingling, focal weakness and headaches. Endo/Heme/Allergies: Does not bruise/bleed easily. Psychiatric/Behavioral: Negative for depression. The patient is not nervous/anxious and does not have insomnia. Objective:   Physical Exam   Constitutional: She appears well-developed. No distress. obese   HENT:   Mouth/Throat: Mucous membranes are normal.   Eyes: Conjunctivae and lids are normal. No scleral icterus. Neck: Neck supple. No thyromegaly present. Cardiovascular: Normal heart sounds. An irregularly irregular rhythm present. No murmur heard. Pulmonary/Chest: Effort normal and breath sounds normal. She has no wheezes. She has no rales. Abdominal: Soft. Bowel sounds are normal. She exhibits no mass. There is no hepatosplenomegaly. There is no tenderness. A hernia (umbilical - 6cm, easily reducible) is present. Hernia confirmed positive in the ventral area (R sided, extending from umbilicus). Musculoskeletal: She exhibits no edema. Lymphadenopathy:     She has no cervical adenopathy. Neurological:   Monofilament intact bilaterally. Pulses R: 2+ and L: 2+. No open wounds. No skin lesions. Skin: No rash noted.    Psychiatric: She has a normal mood and affect. Her behavior is normal.          Visit Vitals    /76    Pulse 100    Temp 98.1 °F (36.7 °C) (Oral)    Resp 16    Ht 5' 3\" (1.6 m)    Wt 182 lb (82.6 kg)    SpO2 97%    BMI 32.24 kg/m2          Advised her to call back or return to office if symptoms worsen/change/persist.  Discussed expected course/resolution/complications of diagnosis in detail with patient. Medication risks/benefits/costs/interactions/alternatives discussed with patient. She was given an after visit summary which includes diagnoses, current medications, & vitals. She expressed understanding with the diagnosis and plan.         Juany Sun MD

## 2017-05-17 NOTE — MR AVS SNAPSHOT
Visit Information Date & Time Provider Department Dept. Phone Encounter #  
 5/17/2017  2:30 PM Juany Sun, [facility] [phone] [id number] Follow-up Instructions Return in about 6 months (around 11/17/2017). Your Appointments 10/26/2017  1:20 PM  
ESTABLISHED PATIENT with Aga Valdez MD  
[facility] (KYLE SCHEDULING) Appt Note: 6 mo fu appt [address] [facility] [address] [place] [address]  
[address] [place] [address]  
  
    
 11/13/2017  2:40 PM  
Any with Maribell Andrew MD  
[address] [facility] ([address]) Appt Note: Yearly F/U, bring machine. [address] [facility] [address]  
  
   
 [address] [facility] [address] [place] [address] Upcoming Health Maintenance Date Due FOBT Q 1 YEAR, 18+ 6/8/1963 DTaP/Tdap/Td series (1 - Tdap) 6/8/1964 ZOSTER VACCINE AGE 60> 6/8/2003 MEDICARE YEARLY EXAM 11/6/2016 BREAST CANCER SCRN MAMMOGRAM 11/17/2016 Pneumococcal 65+ Low/Medium Risk (2 of 2 - PPSV23) 1/27/2017 HEMOGLOBIN A1C Q6M 5/9/2017 LIPID PANEL Q1 5/27/2017 EYE EXAM RETINAL OR DILATED Q1 6/23/2017 INFLUENZA AGE 9 TO ADULT 8/1/2017 FOOT EXAM Q1 11/9/2017 MICROALBUMIN Q1 11/9/2017 GLAUCOMA SCREENING Q2Y 6/23/2018 COLONOSCOPY 11/6/2025 Allergies as of 5/17/2017  Review Complete On: 5/17/2017 By: Juany Sun MD  
  
 Severity Noted Reaction Type Reactions Cetirizine Medium 10/18/2016    Hives Other reaction(s): Hives Amlodipine  01/03/2014   Side Effect Swelling  
 lower extremity swelling Coreg [Carvedilol]  04/29/2014   Side Effect Other (comments)  
 sweating Erythromycin  09/29/2010    Hives Verapamil Low 01/31/2012   Not Verified Other (comments)  Patient unable to take verapamil for rate control secondary to peripheral edema/maliase/feeling of being high. Current Immunizations  Reviewed on 5/17/2017 Name Date Influenza High Dose Vaccine PF 11/6/2015 Influenza Vaccine Split 1/28/2012 12:03 AM, 3/31/2011  2:45 AM  
 Pneumococcal Vaccine (Unspecified Type) 1/27/2012 11:56 PM  
  
 Reviewed by Viktor Birch RN on 5/17/2017 at  2:13 PM  
You Were Diagnosed With   
  
 Codes Comments Medicare annual wellness visit, subsequent    -  Primary ICD-10-CM: Z00.00 ICD-9-CM: V70.0 ACP (advance care planning)     ICD-10-CM: Z71.89 ICD-9-CM: V65.49 Encounter for immunization     ICD-10-CM: B91 ICD-9-CM: V03.89 Screening for alcoholism     ICD-10-CM: Z13.89 ICD-9-CM: V79.1 Body mass index 32.0-32.9, adult     ICD-10-CM: Z68.32 
ICD-9-CM: V85.32 Simple chronic bronchitis (HCC)     ICD-10-CM: J41.0 ICD-9-CM: 491.0 Controlled type 2 diabetes mellitus without complication, without long-term current use of insulin ([facility] [address]     ICD-10-CM: E11.9 ICD-9-CM: 250.00 Persistent atrial fibrillation (HCC)     ICD-10-CM: I48.1 ICD-9-CM: 427.31   
 YOLANDA (obstructive sleep apnea)     ICD-10-CM: G47.33 
ICD-9-CM: 327.23 Vitals BP Pulse Temp Resp Height(growth percentile) Weight(growth percentile) 120/76 100 98.1 °F (36.7 °C) (Oral) 16 5' 3\" (1.6 m) 182 lb (82.6 kg) SpO2 BMI OB Status Smoking Status 97% 32.24 kg/m2 Hysterectomy Former Smoker BMI and BSA Data Body Mass Index Body Surface Area  
 32.24 kg/m 2 1.92 m 2 Preferred Pharmacy Pharmacy Name Phone [id number] [phone] Your Updated Medication List  
  
   
This list is accurate as of: 5/17/17  2:47 PM.  Always use your most recent med list.  
  
  
  
  
 Adriana Nassar 250-50 mcg/dose diskus inhaler Generic drug:  fluticasone-salmeterol INHALE 1 PUFF TWICE A DAY  
  
 albuterol 90 mcg/actuation inhaler Commonly known as:  PROVENTIL HFA, VENTOLIN HFA, PROAIR HFA Take 1 Puff by inhalation every six (6) hours as needed for Wheezing. albuterol-ipratropium 2.5 mg-0.5 mg/3 ml Nebu Commonly known as:  DUO-NEB  
3 mL by Nebulization route four (4) times daily. aspirin 81 mg tablet Take 1 Tab by mouth daily. atorvastatin 10 mg tablet Commonly known as:  LIPITOR  
TAKE 1 TAB BY MOUTH DAILY. carvedilol 6.25 mg tablet Commonly known as:  Hayley Stable Take 1 Tab by mouth two (2) times daily (with meals). cpap machine kit  
by Does Not Apply route nightly. digoxin 0.125 mg tablet Commonly known as:  LANOXIN  
TAKE 1 TABLET BY MOUTH EVERY DAY  
  
 ELIQUIS 5 mg tablet Generic drug:  apixaban TAKE 1 TAB BY MOUTH TWO (2) TIMES A DAY. furosemide 20 mg tablet Commonly known as:  LASIX  
1 tablet daily  
  
 multivitamin tablet Commonly known as:  ONE A DAY Take 1 Tab by mouth daily. tiotropium 18 mcg inhalation capsule Commonly known as:  Walter Rivera Take 1 Cap by inhalation daily. triamcinolone acetonide 0.1 % topical cream  
Commonly known as:  KENALOG  
APPLY A THIN LAYER TO AFFECTED AREA(S) AS DIRECTED TWICE A DAY AS NEEDED  
  
 valsartan-hydroCHLOROthiazide 160-25 mg per tablet Commonly known as:  DIOVAN-HCT  
TAKE 1 TABLET BY MOUTH EVERY DAY Prescriptions Sent to Pharmacy Refills  
 triamcinolone acetonide (KENALOG) 0.1 % topical cream 0 Sig: APPLY A THIN LAYER TO AFFECTED AREA(S) AS DIRECTED TWICE A DAY AS NEEDED Class: Normal  
 Pharmacy: [address] Ph #: [phone] We Performed the Following HEMOGLOBIN A1C WITH EAG [80047 CPT(R)]  DIABETES FOOT EXAM [HM7 Custom] LIPID PANEL [02718 CPT(R)] METABOLIC PANEL, COMPREHENSIVE [73119 CPT(R)] Follow-up Instructions Return in about 6 months (around 11/17/2017). To-Do List   
 05/19/2017 10:00 AM  
 Appointment with [address] [place] [address] 4 at [address] ([phone]) Shower or bathe using soap and water. Do not use deodorant, powder, perfumes, or lotion the day of your exam.  If your prior mammograms were not performed at [facility] please bring films with you or forward prior images 2 days before your procedure. Check in at registration 15min before your appointment time unless you were instructed to do otherwise. A script is not necessary, but if you have one, please bring it on the day of the mammogram or have it faxed to the department. [facility] [phone] [address] [place] [address]  [phone] [address]  [phone] [facility] [phone] [address] [phone] Patient Instructions Preventing Falls: Care Instructions Your Care Instructions Getting around your home safely can be a challenge if you have injuries or health problems that make it easy for you to fall. Loose rugs and furniture in walkways are among the dangers for many older people who have problems walking or who have poor eyesight. People who have conditions such as arthritis, osteoporosis, or dementia also have to be careful not to fall. You can make your home safer with a few simple measures. Follow-up care is a key part of your treatment and safety. Be sure to make and go to all appointments, and call your doctor if you are having problems. It's also a good idea to know your test results and keep a list of the medicines you take. How can you care for yourself at home? Taking care of yourself · You may get dizzy if you do not drink enough water. To prevent dehydration, drink plenty of fluids, enough so that your urine is light yellow or clear like water. Choose water and other caffeine-free clear liquids. If you have kidney, heart, or liver disease and have to limit fluids, talk with your doctor before you increase the amount of fluids you drink. · Exercise regularly to improve your strength, muscle tone, and balance. Walk if you can. Swimming may be a good choice if you cannot walk easily. · Have your vision and hearing checked each year or any time you notice a change. If you have trouble seeing and hearing, you might not be able to avoid objects and could lose your balance. · Know the side effects of the medicines you take. Ask your doctor or pharmacist whether the medicines you take can affect your balance. Sleeping pills or sedatives can affect your balance. · Limit the amount of alcohol you drink. Alcohol can impair your balance and other senses. · Ask your doctor whether calluses or corns on your feet need to be removed. If you wear loose-fitting shoes because of calluses or corns, you can lose your balance and fall. · Talk to your doctor if you have numbness in your feet. Preventing falls at home · Remove raised doorway thresholds, throw rugs, and clutter. Repair loose carpet or raised areas in the floor. · Move furniture and electrical cords to keep them out of walking paths. · Use nonskid floor wax, and wipe up spills right away, especially on ceramic tile floors. · If you use a walker or cane, put rubber tips on it. If you use crutches, clean the bottoms of them regularly with an abrasive pad, such as steel wool. · Keep your house well lit, especially Alma Finn, and outside walkways. Use night-lights in areas such as hallways and bathrooms. Add extra light switches or use remote switches (such as switches that go on or off when you clap your hands) to make it easier to turn lights on if you have to get up during the night. · Install sturdy handrails on stairways. · Move items in your cabinets so that the things you use a lot are on the lower shelves (about waist level). · Keep a cordless phone and a flashlight with new batteries by your bed.  If possible, put a phone in each of the main rooms of your house, or carry a cell phone in case you fall and cannot reach a phone. Or, you can wear a device around your neck or wrist. You push a button that sends a signal for help. · Wear low-heeled shoes that fit well and give your feet good support. Use footwear with nonskid soles. Check the heels and soles of your shoes for wear. Repair or replace worn heels or soles. · Do not wear socks without shoes on wood floors. · Walk on the grass when the sidewalks are slippery. If you live in an area that gets snow and ice in the winter, sprinkle salt on slippery steps and sidewalks. Preventing falls in the bath · Install grab bars and nonskid mats inside and outside your shower or tub and near the toilet and sinks. · Use shower chairs and bath benches. · Use a hand-held shower head that will allow you to sit while showering. · Get into a tub or shower by putting the weaker leg in first. Get out of a tub or shower with your strong side first. 
· Repair loose toilet seats and consider installing a raised toilet seat to make getting on and off the toilet easier. · Keep your bathroom door unlocked while you are in the shower. Where can you learn more? Go to http://brandy-giorgio.info/. Enter [phone] [id number] [phone] in the search box to learn more about \"Preventing Falls: Care Instructions. \" Current as of: August 4, 2016 Content Version: 11.2 © 1727-8327 [employer]. Care instructions adapted under license by [employer] (which disclaims liability or warranty for this information). If you have questions about a medical condition or this instruction, always ask your healthcare professional. [place] [address] any warranty or liability for your use of this information. Medicare Wellness Visit, Female The best way to live healthy is to have a healthy lifestyle by eating a well-balanced diet, exercising regularly, limiting alcohol and stopping smoking. Regular physical exams and screening tests are another way to keep healthy. Preventive exams provided by your health care provider can find health problems before they become diseases or illnesses. Preventive services including immunizations, screening tests, monitoring and exams can help you take care of your own health. All people over age 72 should have a pneumovax  and and a prevnar shot to prevent pneumonia. These are once in a lifetime unless you and your provider decide differently. All people over 65 should have a yearly flu shot and a tetanus vaccine every 10 years. A bone mass density to screen for osteoporosis or thinning of the bones should be done every 2 years after 65. Screening for diabetes mellitus with a blood sugar test should be done every year. Glaucoma is a disease of the eye due to increased ocular pressure that can lead to blindness and it should be done every year by an eye professional. 
 
Cardiovascular screening tests that check for elevated lipids (fatty part of blood) which can lead to heart disease and strokes should be done every 5 years. Colorectal screening that evaluates for blood or polyps in your colon should be done yearly as a stool test or every five years as a flexible sigmoidoscope or every 10 years as a colonoscopy up to age 76. Breast cancer screening with a mammogram is recommended biennially  for women age 54-69. Screening for cervical cancer with a pap smear and pelvic exam is recommended for women after age 72 years every 2 years up to age 79 or when the provider and patient decide to stop. If there is a history of cervical abnormalities or other increased risk for cancer then the test is recommended yearly. Hepatitis C screening is also recommended for anyone born between 80 through [address]. A shingles vaccine is also recommended once in a lifetime after age 61. Your Medicare Wellness Exam is recommended annually. Here is a list of your current Health Maintenance items with a due date: 
Health Maintenance Due Topic Date Due  Stool testing for trace blood  06/08/1963  
 DTaP/Tdap/Td  (1 - Tdap) 06/08/1964  Shingles Vaccine  06/08/2003 Mayra Teresa Annual Well Visit  11/06/2016  Breast Cancer Screening  11/17/2016  Pneumococcal Vaccine (2 of 2 - PPSV23) 01/27/2017  Hemoglobin A1C    05/09/2017  Cholesterol Test   05/27/2017 Advance Directives: Care Instructions Your Care Instructions An advance directive is a legal way to state your wishes at the end of your life. It tells your family and your doctor what to do if you can no longer say what you want. There are two main types of advance directives. You can change them any time that your wishes change. · A living will tells your family and your doctor your wishes about life support and other treatment. · A durable power of  for health care lets you name a person to make treatment decisions for you when you can't speak for yourself. This person is called a health care agent. If you do not have an advance directive, decisions about your medical care may be made by a doctor or a  who doesn't know you. It may help to think of an advance directive as a gift to the people who care for you. If you have one, they won't have to make tough decisions by themselves. Follow-up care is a key part of your treatment and safety. Be sure to make and go to all appointments, and call your doctor if you are having problems. It's also a good idea to know your test results and keep a list of the medicines you take. How can you care for yourself at home? · Discuss your wishes with your loved ones and your doctor. This way, there are no surprises. · Many states have a unique form. Or you might use a universal form that has been approved by many states. This kind of form can sometimes be completed and stored online.  Your electronic copy will then be available wherever you have a connection to the Internet. In most cases, doctors will respect your wishes even if you have a form from a different state. · You don't need a  to do an advance directive. But you may want to get legal advice. · Think about these questions when you prepare an advance directive: ¨ Who do you want to make decisions about your medical care if you are not able to? Many people choose a family member or close friend. ¨ Do you know enough about life support methods that might be used? If not, talk to your doctor so you understand. ¨ What are you most afraid of that might happen? You might be afraid of having pain, losing your independence, or being kept alive by machines. ¨ Where would you prefer to die? Choices include your home, a hospital, or a nursing home. ¨ Would you like to have information about hospice care to support you and your family? ¨ Do you want to donate organs when you die? ¨ Do you want certain Orthodox practices performed before you die? If so, put your wishes in the advance directive. · Read your advance directive every year, and make changes as needed. When should you call for help? Be sure to contact your doctor if you have any questions. Where can you learn more? Go to http://brandy-giorgio.info/. Enter R264 in the search box to learn more about \"Advance Directives: Care Instructions. \" Current as of: November 17, 2016 Content Version: 11.2 © 1553-4887 [employer]. Care instructions adapted under license by [employer] (which disclaims liability or warranty for this information). If you have questions about a medical condition or this instruction, always ask your healthcare professional. [address] any warranty or liability for your use of this information. Introducing [facility]!    
 Sully Garg introduces [employer] patient portal. Now you can access parts of your medical record, email your doctor's office, and request medication refills online. 1. In your internet browser, go to [email or web address] 2. Click on the First Time User? Click Here link in the Sign In box. You will see the New Member Sign Up page. 3. Enter your SharesVault Access Code exactly as it appears below. You will not need to use this code after youve completed the sign-up process. If you do not sign up before the expiration date, you must request a new code. · SharesVault Access Code: [email or web address] Expires: 6/21/2017  3:37 PM 
 
4. Enter the last four digits of your Social Security Number (xxxx) and Date of Birth (mm/dd/yyyy) as indicated and click Submit. You will be taken to the next sign-up page. 5. Create a SharesVault ID. This will be your SharesVault login ID and cannot be changed, so think of one that is secure and easy to remember. 6. Create a Sh[employer]Vault password. You can change your password at any time. 7. Enter your Password Reset Question and Answer. This can be used at a later time if you forget your password. 8. Enter your e-mail address. You will receive e-mail notification when new information is available in [address] 9. Click Sign Up. You can now view and download portions of your medical record. 10. Click the Download Summary menu link to download a portable copy of your medical information. If you have questions, please visit the Frequently Asked Questions section of the [employer]ault website. Remember, SharesVault is NOT to be used for urgent needs. For medical emergencies, dial 911. Now available from your iPhone and Android! Please provide this summary of care documentation to your next provider. Your primary care clinician is listed as Jamie Nance. If you have any questions after today's visit, please call [phone].

## 2017-05-17 NOTE — ACP (ADVANCE CARE PLANNING)
Advance Care Planning (ACP) Provider Note - Comprehensive     Date of ACP Conversation: 05/17/17  Persons included in Conversation:  patient      Authorized Decision Maker (if patient is incapable of making informed decisions): This person is: Other Legally Authorized Decision Maker (e.g. Next of Kin)        General ACP for ALL Patients with Decision Making Capacity:  Importance of advance care planning, including choosing a healthcare agent to communicate patient's healthcare decisions if patient lost the ability to make decisions, such as after a sudden illness or accident  Understanding of the healthcare agent role was assessed and information provided  Opportunity offered to explore how cultural, Judaism, spiritual, or personal beliefs would affect decisions for future care     For Serious or Chronic Illness:  Her medical problems were reviewed with them. Understanding of medical condition    Understanding of CPR, goals and expected outcomes, benefits and burdens discussed. Information on CPR success rates provided (e.g. for CPR in hospital, survival to d/c at two weeks is 22%, for chronically ill or elderly/frail survival is less than 3%); Individual asked to communicate understanding of information in his/her own words. Interventions Provided:  Recommended communicating the plan and making copies for the healthcare agent, personal physician, and others as appropriate (e.g., health system)  Recommended review of completed ACP document annually or upon change in health status      She  has NO advanced directive  - add't info provided. Reviewed DNR/DNI and patient is not interested.

## 2017-05-19 ENCOUNTER — HOSPITAL ENCOUNTER (OUTPATIENT)
Dept: MAMMOGRAPHY | Age: 72
Discharge: HOME | End: 2017-05-19
Attending: INTERNAL MEDICINE
Payer: MEDICARE

## 2017-05-19 DIAGNOSIS — Z12.31 VISIT FOR SCREENING MAMMOGRAM: ICD-10-CM

## 2017-05-19 PROCEDURE — 77067 SCR MAMMO BI INCL CAD: CPT

## 2017-06-06 ENCOUNTER — OFFICE VISIT (OUTPATIENT)
Dept: INTERNAL MEDICINE CLINIC | Age: 72
End: 2017-06-06

## 2017-06-06 VITALS
WEIGHT: 181.2 LBS | DIASTOLIC BLOOD PRESSURE: 82 MMHG | HEIGHT: 63 IN | RESPIRATION RATE: 20 BRPM | BODY MASS INDEX: 32.11 KG/M2 | OXYGEN SATURATION: 94 % | HEART RATE: 92 BPM | SYSTOLIC BLOOD PRESSURE: 120 MMHG | TEMPERATURE: 99.5 F

## 2017-06-06 DIAGNOSIS — M54.2 NECK PAIN ON RIGHT SIDE: ICD-10-CM

## 2017-06-06 DIAGNOSIS — J01.00 ACUTE NON-RECURRENT MAXILLARY SINUSITIS: Primary | ICD-10-CM

## 2017-06-06 RX ORDER — AMOXICILLIN 875 MG/1
875 TABLET, FILM COATED ORAL 2 TIMES DAILY
Qty: 20 TAB | Refills: 0 | Status: SHIPPED | OUTPATIENT
Start: 2017-06-06 | End: 2017-06-16

## 2017-06-06 RX ORDER — TIZANIDINE 2 MG/1
2 TABLET ORAL
Qty: 7 TAB | Refills: 0 | Status: SHIPPED | OUTPATIENT
Start: 2017-06-06 | End: 2017-12-27

## 2017-06-06 NOTE — MR AVS SNAPSHOT
Visit Information Date & Time Provider Department Dept. Phone Encounter #  
 6/6/2017  8:00 AM Chiqui Nguyen, 12[address] [facility] [phone] [id number] Follow-up Instructions Return if symptoms worsen or fail to improve. Your Appointments 10/26/2017  1:20 PM  
ESTABLISHED PATIENT with Lamberto Montoya MD  
[facility] (KYLE SCHEDULING) Appt Note: 6 mo fu appt [address] [facility] [address] [place] [address]  
[address] [facility] [address]  
  
    
 11/13/2017  2:40 PM  
Any with Slade Vasquez MD  
[address] [place] [address] ([facility]) Appt Note: Yearly F/U, bring machine. [facility] [address] [id number]  
[phone]  
  
   
 [address] [facility] [address] [place] [phone]  
  
    
 11/17/2017  1:15 PM  
ROUTINE CARE with Chiqui Nguyen MD  
[facility] Internal Medicine [facility]) Appt Note: f/u 6m  
 330 [facility] [address] [address] [facility] [place] [address] [place] [address] Upcoming Health Maintenance Date Due FOBT Q 1 YEAR, 18+ 6/8/1961 DTaP/Tdap/Td series (1 - Tdap) 6/8/1964 ZOSTER VACCINE AGE 60> 6/8/2003 Pneumococcal 65+ Low/Medium Risk (2 of 2 - PPSV23) 1/27/2017 HEMOGLOBIN A1C Q6M 5/9/2017 LIPID PANEL Q1 5/27/2017 EYE EXAM RETINAL OR DILATED Q1 6/23/2017 INFLUENZA AGE 9 TO ADULT 8/1/2017 MICROALBUMIN Q1 11/9/2017 BREAST CANCER SCRN MAMMOGRAM 11/19/2017 FOOT EXAM Q1 5/17/2018 MEDICARE YEARLY EXAM 5/18/2018 GLAUCOMA SCREENING Q2Y 6/23/2018 COLONOSCOPY 11/6/2025 Allergies as of 6/6/2017  Review Complete On: 6/6/2017 By: Chiqui Nguyen MD  
  
 Severity Noted Reaction Type Reactions Cetirizine Medium 10/18/2016    Hives Other reaction(s): Hives Amlodipine  01/03/2014   Side Effect Swelling lower extremity swelling Coreg [Carvedilol]  04/29/2014   Side Effect Other (comments)  
 sweating Erythromycin  09/29/2010    Hives Verapamil Low 01/31/2012   Not Verified Other (comments) Patient unable to take verapamil for rate control secondary to peripheral edema/maliase/feeling of being high. Current Immunizations  Reviewed on 6/6/2017 Name Date Influenza High Dose Vaccine PF 11/6/2015 Influenza Vaccine Split 1/28/2012 12:03 AM, 3/31/2011  2:45 AM  
 Pneumococcal Vaccine (Unspecified Type) 1/27/2012 11:56 PM  
  
 Reviewed by Marlo Montaño RN on 6/6/2017 at  8:06 AM  
You Were Diagnosed With   
  
 Codes Comments Acute non-recurrent maxillary sinusitis    -  Primary ICD-10-CM: J01.00 ICD-9-CM: 461.0 Neck pain on right side     ICD-10-CM: M54.2 ICD-9-CM: 723.1 Vitals BP Pulse Temp Resp Height(growth percentile) Weight(growth percentile) 120/82 92 99.5 °F (37.5 °C) (Oral) 20 5' 3\" (1.6 m) 181 lb 3.2 oz (82.2 kg) SpO2 BMI OB Status Smoking Status 94% 32.1 kg/m2 Hysterectomy Former Smoker BMI and BSA Data Body Mass Index Body Surface Area  
 32.1 kg/m 2 1.91 m 2 Preferred Pharmacy Pharmacy Name Phone [facility] [address] [place] [address] Your Updated Medication List  
  
   
This list is accurate as of: 6/6/17  8:32 AM.  Always use your most recent med list.  
  
  
  
  
 ADVAIR DISKUS 250-50 mcg/dose diskus inhaler Generic drug:  fluticasone-salmeterol INHALE 1 PUFF TWICE A DAY  
  
 albuterol 90 mcg/actuation inhaler Commonly known as:  PROVENTIL HFA, VENTOLIN HFA, PROAIR HFA Take 1 Puff by inhalation every six (6) hours as needed for Wheezing. albuterol-ipratropium 2.5 mg-0.5 mg/3 ml Nebu Commonly known as:  DUO-NEB  
3 mL by Nebulization route four (4) times daily. amoxicillin 875 mg tablet Commonly known as:  AMOXIL Take 1 Tab by mouth two (2) times a day for 10 days. aspirin 81 mg tablet Take 1 Tab by mouth daily. atorvastatin 10 mg tablet Commonly known as:  LIPITOR  
TAKE 1 TAB BY MOUTH DAILY. carvedilol 6.25 mg tablet Commonly known as:  Renny Reef Take 1 Tab by mouth two (2) times daily (with meals). cpap machine kit  
by Does Not Apply route nightly. digoxin 0.125 mg tablet Commonly known as:  LANOXIN  
TAKE 1 TABLET BY MOUTH EVERY DAY  
  
 ELIQUIS 5 mg tablet Generic drug:  apixaban TAKE 1 TAB BY MOUTH TWO (2) TIMES A DAY. furosemide 20 mg tablet Commonly known as:  LASIX  
1 tablet daily  
  
 multivitamin tablet Commonly known as:  ONE A DAY Take 1 Tab by mouth daily. tiotropium 18 mcg inhalation capsule Commonly known as:  Rudabby Guardado Take 1 Cap by inhalation daily. tiZANidine 2 mg tablet Commonly known as:  Leandro Burton Take 1 Tab by mouth nightly as needed. triamcinolone acetonide 0.1 % topical cream  
Commonly known as:  KENALOG  
APPLY A THIN LAYER TO AFFECTED AREA(S) AS DIRECTED TWICE A DAY AS NEEDED  
  
 valsartan-hydroCHLOROthiazide 160-25 mg per tablet Commonly known as:  DIOVAN-HCT  
TAKE 1 TABLET BY MOUTH EVERY DAY Prescriptions Sent to Pharmacy Refills  
 amoxicillin (AMOXIL) 875 mg tablet 0 Sig: Take 1 Tab by mouth two (2) times a day for 10 days. Class: Normal  
 Pharmacy: [address] [facility], [facility]  Ph #: [phone] Route: Oral  
 tiZANidine (ZANAFLEX) 2 mg tablet 0 Sig: Take 1 Tab by mouth nightly as needed. Class: Normal  
 Pharmacy: [facility] [address] [place] [address],[address], [facility]  Ph #: [phone] Route: Oral  
  
Follow-up Instructions Return if symptoms worsen or fail to improve. Patient Instructions Heat: apply heating pad 10-15 minutes at a time 3-4 times per day Stretching: Start stretching/exercises (see below). Try to do this 1-2 times per day as tolerated. Torticollis: Care Instructions Your Care Instructions Torticollis is a severe tightness of the muscles on one side of the neck. The tight muscles can make the head turn to one side, lean to one side, or be pulled forward or backward. It is also called wryneck. Your doctor asked questions about your health and examined you. You may also have had X-rays or other tests. If your doctor thinks another medical problem is causing your tight neck muscles, you may need more tests. Torticollis usually gets better with home care. Your doctor may have you take medicine to relieve pain or relax your muscles. He or she may suggest exercise and physical therapy to help increase flexibility and relieve stress. Your doctor may also have you wear a special collar, called a cervical collar, for a day or two. The collar may help make your neck more comfortable. Follow-up care is a key part of your treatment and safety. Be sure to make and go to all appointments, and call your doctor if you are having problems. It's also a good idea to know your test results and keep a list of the medicines you take. How can you care for yourself at home? · Be safe with medicines. Read and follow all instructions on the label. ¨ If the doctor gave you a prescription medicine for pain, take it as prescribed. ¨ If you are not taking a prescription pain medicine, ask your doctor if you can take an over-the-counter medicine. · Try using a heating pad on a low or medium setting for 15 to 20 minutes every 2 or 3 hours. Try a warm shower in place of one session with the heating pad. · Try using an ice pack for 10 to 15 minutes every 2 to 3 hours. Put a thin cloth between the ice and your skin. · If your doctor recommends a cervical collar, wear it exactly as directed. When should you call for help? Call your doctor now or seek immediate medical care if: · You have new or worse numbness in your arms, buttocks, or legs. · You have new or worse weakness in your arms or legs. · Your neck pain gets worse. · You lose bladder or bowel control. Watch closely for changes in your health, and be sure to contact your doctor if: 
· You do not get better as expected. Where can you learn more? Go to http://brandy-giorgio.info/. Enter K175 in the search box to learn more about \"Torticollis: Care Instructions. \" Current as of: May 23, 2016 Content Version: 11.2 © 5053-0119 [email or web address]. Care instructions adapted under license by [employer] (which disclaims liability or warranty for this information). If you have questions about a medical condition or this instruction, always ask your healthcare professional. [address] any warranty or liability for your use of this information. Neck: Exercises Your Care Instructions Here are some examples of typical rehabilitation exercises for your condition. Start each exercise slowly. Ease off the exercise if you start to have pain. Your doctor or physical therapist will tell you when you can start these exercises and which ones will work best for you. How to do the exercises Note: Stretching should make you feel a gentle stretch, but no pain. Stop any strengthening exercise that makes pain worse. Neck stretch 1. This stretch works best if you keep your shoulder down as you lean away from it. To help you remember to do this, start by relaxing your shoulders and lightly holding on to your thighs or your chair. 2. Tilt your head toward your shoulder and hold for 15 to 30 seconds. Let the weight of your head stretch your muscles. 3. If you would like a little added stretch, use your hand to gently and steadily pull your head toward your shoulder. For example, keeping your right shoulder down, lean your head to the left. 4. Repeat 2 to 4 times toward each shoulder. Diagonal neck stretch 1. Turn your head slightly toward the direction you will be stretching, and tilt your head diagonally toward your chest and hold for 15 to 30 seconds. 2. If you would like a little added stretch, use your hand to gently and steadily pull your head forward on the diagonal. 
3. Repeat 2 to 4 times toward each side. Dorsal glide stretch 1. Sit or stand tall and look straight ahead. 2. Slowly tuck your chin as you glide your head backward over your body 3. Hold for a count of 6, and then relax for up to 10 seconds. 4. Repeat 8 to 12 times. Note: The dorsal glide stretches the back of the neck. If you feel pain, do not glide so far back. Some people find this exercise easier to do while lying on their backs with an ice pack on the neck. Chest and shoulder stretch 1. Sit or stand tall and glide your head backward as in the dorsal glide stretch. 2. Raise both arms so that your hands are next to your ears. 3. Take a deep breath, and as you breathe out, lower your elbows down and behind your back. You will feel your shoulder blades slide down and together, and at the same time you will feel a stretch across your chest and the front of your shoulders. 4. Hold for about 6 seconds, and then relax for up to 10 seconds. 5. Repeat 8 to 12 times. Strengthening: Hands on head 1. Move your head backward, forward, and side to side against gentle pressure from your hands, holding each position for about 6 seconds. 2. Repeat 8 to 12 times. Follow-up care is a key part of your treatment and safety. Be sure to make and go to all appointments, and call your doctor if you are having problems. It's also a good idea to know your test results and keep a list of the medicines you take. Where can you learn more? Go to http://brandy-giorgio.info/. Enter P975 in the search box to learn more about \"Neck: Exercises. \" 
 Current as of: May 23, 2016 Content Version: 11.2 © 0510-5009 [employer], [employer]. Care instructions adapted under license by [employer]redUP (which disclaims liability or warranty for this information). If you have questions about a medical condition or this instruction, always ask your healthcare professional. [address] any warranty or liability for your use of this information. Introducing [facility] & HEALTH [facility]! [employer] introduces Firmafon patient portal. Now you can access parts of your medical record, email your doctor's office, and request medication refills online. 1. In your internet browser, go to [email or web address] [email or web address]. [email or web address]/China Wi Max 2. Click on the First Time User? Click Here link in the Sign In box. You will see the New Member Sign Up page. 3. Enter your Firmafon Access Code exactly as it appears below. You will not need to use this code after youve completed the sign-up process. If you do not sign up before the expiration date, you must request a new code. · Firmafon Access Code: [email or web address] Expires: 6/21/2017  3:37 PM 
 
4. Enter the last four digits of your Social Security Number (xxxx) and Date of Birth (mm/dd/yyyy) as indicated and click Submit. You will be taken to the next sign-up page. 5. Create a Firmafon ID. This will be your Firmafon login ID and cannot be changed, so think of one that is secure and easy to remember. 6. Create a Firmafon password. You can change your password at any time. 7. Enter your Password Reset Question and Answer. This can be used at a later time if you forget your password. 8. Enter your e-mail address. You will receive e-mail notification when new information is available in [address] 9. Click Sign Up. You can now view and download portions of your medical record. 10. Click the Download Summary menu link to download a portable copy of your medical information. If you have questions, please visit the Frequently Asked Questions section of the Ad.IQt website. Remember, [employer] is NOT to be used for urgent needs. For medical emergencies, dial 911. Now available from your iPhone and Android! Please provide this summary of care documentation to your next provider. Your primary care clinician is listed as Deidre Gee. If you have any questions after today's visit, please call [phone].

## 2017-06-06 NOTE — PATIENT INSTRUCTIONS
Heat: apply heating pad 10-15 minutes at a time 3-4 times per day    Stretching:  Start stretching/exercises (see below). Try to do this 1-2 times per day as tolerated. Torticollis: Care Instructions  Your Care Instructions  Torticollis is a severe tightness of the muscles on one side of the neck. The tight muscles can make the head turn to one side, lean to one side, or be pulled forward or backward. It is also called wryneck. Your doctor asked questions about your health and examined you. You may also have had X-rays or other tests. If your doctor thinks another medical problem is causing your tight neck muscles, you may need more tests. Torticollis usually gets better with home care. Your doctor may have you take medicine to relieve pain or relax your muscles. He or she may suggest exercise and physical therapy to help increase flexibility and relieve stress. Your doctor may also have you wear a special collar, called a cervical collar, for a day or two. The collar may help make your neck more comfortable. Follow-up care is a key part of your treatment and safety. Be sure to make and go to all appointments, and call your doctor if you are having problems. It's also a good idea to know your test results and keep a list of the medicines you take. How can you care for yourself at home? · Be safe with medicines. Read and follow all instructions on the label. ¨ If the doctor gave you a prescription medicine for pain, take it as prescribed. ¨ If you are not taking a prescription pain medicine, ask your doctor if you can take an over-the-counter medicine. · Try using a heating pad on a low or medium setting for 15 to 20 minutes every 2 or 3 hours. Try a warm shower in place of one session with the heating pad. · Try using an ice pack for 10 to 15 minutes every 2 to 3 hours. Put a thin cloth between the ice and your skin. · If your doctor recommends a cervical collar, wear it exactly as directed.   When should you call for help? Call your doctor now or seek immediate medical care if:  · You have new or worse numbness in your arms, buttocks, or legs. · You have new or worse weakness in your arms or legs. · Your neck pain gets worse. · You lose bladder or bowel control. Watch closely for changes in your health, and be sure to contact your doctor if:  · You do not get better as expected. Where can you learn more? Go to http://brandy-giorgio.info/. Enter W231 in the search box to learn more about \"Torticollis: Care Instructions. \"  Current as of: May 23, 2016  Content Version: 11.2  © 2269-4620 [employer]. Care instructions adapted under license by [employer] (which disclaims liability or warranty for this information). If you have questions about a medical condition or this instruction, always ask your healthcare professional. [place] [address] any warranty or liability for your use of this information. Neck: Exercises  Your Care Instructions  Here are some examples of typical rehabilitation exercises for your condition. Start each exercise slowly. Ease off the exercise if you start to have pain. Your doctor or physical therapist will tell you when you can start these exercises and which ones will work best for you. How to do the exercises  Note: Stretching should make you feel a gentle stretch, but no pain. Stop any strengthening exercise that makes pain worse. Neck stretch    1. This stretch works best if you keep your shoulder down as you lean away from it. To help you remember to do this, start by relaxing your shoulders and lightly holding on to your thighs or your chair. 2. Tilt your head toward your shoulder and hold for 15 to 30 seconds. Let the weight of your head stretch your muscles. 3. If you would like a little added stretch, use your hand to gently and steadily pull your head toward your shoulder.  For example, keeping your right shoulder down, lean your head to the left. 4. Repeat 2 to 4 times toward each shoulder. Diagonal neck stretch    1. Turn your head slightly toward the direction you will be stretching, and tilt your head diagonally toward your chest and hold for 15 to 30 seconds. 2. If you would like a little added stretch, use your hand to gently and steadily pull your head forward on the diagonal.  3. Repeat 2 to 4 times toward each side. Dorsal glide stretch    1. Sit or stand tall and look straight ahead. 2. Slowly tuck your chin as you glide your head backward over your body  3. Hold for a count of 6, and then relax for up to 10 seconds. 4. Repeat 8 to 12 times. Note: The dorsal glide stretches the back of the neck. If you feel pain, do not glide so far back. Some people find this exercise easier to do while lying on their backs with an ice pack on the neck. Chest and shoulder stretch    1. Sit or stand tall and glide your head backward as in the dorsal glide stretch. 2. Raise both arms so that your hands are next to your ears. 3. Take a deep breath, and as you breathe out, lower your elbows down and behind your back. You will feel your shoulder blades slide down and together, and at the same time you will feel a stretch across your chest and the front of your shoulders. 4. Hold for about 6 seconds, and then relax for up to 10 seconds. 5. Repeat 8 to 12 times. Strengthening: Hands on head    1. Move your head backward, forward, and side to side against gentle pressure from your hands, holding each position for about 6 seconds. 2. Repeat 8 to 12 times. Follow-up care is a key part of your treatment and safety. Be sure to make and go to all appointments, and call your doctor if you are having problems. It's also a good idea to know your test results and keep a list of the medicines you take. Where can you learn more? Go to http://brandy-giorgio.info/.   Enter P975 in the search box to learn more about \"Neck: Exercises. \"  Current as of: May 23, 2016  Content Version: 11.2  © 7137-8245 [employer]. Care instructions adapted under license by [employer] (which disclaims liability or warranty for this information). If you have questions about a medical condition or this instruction, always ask your healthcare professional. [place] [address] any warranty or liability for your use of this information.

## 2017-06-06 NOTE — PROGRESS NOTES
Vini Suarez is a 66 y.o. female who was seen in clinic today (6/6/2017). Assessment & Plan:  Adelaida Duarte was seen today for cold symptoms. Diagnoses and all orders for this visit:    Acute non-recurrent maxillary sinusitis- discussed diagnosis & treatment options, likely bacterial at this time. Will start meds below, red flags were reviewed with the patient to RTC or notify me. Expected time course for resolution reviewed with patient. -     amoxicillin (AMOXIL) 875 mg tablet; Take 1 Tab by mouth two (2) times a day for 10 days. Neck pain on right side- this is a new problem, fluctuating since it started, differential dx reviewed with the patient, sounds like torticollis. Will treat with: heat, rest, stretching. Reviewed PT if no changes. See AVS, Red flags were reviewed with the patient to RTC or notify me, expected time course for resolution reviewed. -     tiZANidine (ZANAFLEX) 2 mg tablet; Take 1 Tab by mouth nightly as needed. Follow-up Disposition:  Return if symptoms worsen or fail to improve.       ----------------------------------------------------------------------    Subjective:  URI Review  Adelaida Duarte returns to clinic today to talk about: URI symptoms for 6 days ago, which are gradually worsening since that time. She also reports sneezing, sore throat, post nasal drip, productive cough, chills, sinus congestion and chest congestion. She woke up with her L eye pink and discharge. No pain. She denies a history of: fever, wheezing and SOB/FLOOD. Treatments have included: Robitussin-DM and cough drops and cepacol which have been somewhat effective. Relevant PMH: DM & a-fib  Patient reports sick contacts: no.         Prior to Admission medications    Medication Sig Start Date End Date Taking?  Authorizing Provider   triamcinolone acetonide (KENALOG) 0.1 % topical cream APPLY A THIN LAYER TO AFFECTED AREA(S) AS DIRECTED TWICE A DAY AS NEEDED 5/17/17  Yes Elizabeth Vang MD ELIQUIS 5 mg tablet TAKE 1 TAB BY MOUTH TWO (2) TIMES A DAY. 5/15/17  Yes Jg Packer MD   digoxin (LANOXIN) 0.125 mg tablet TAKE 1 TABLET BY MOUTH EVERY DAY 4/26/17  Yes Jg Packer MD   valsartan-hydroCHLOROthiazide (DIOVAN-HCT) 160-25 mg per tablet TAKE 1 TABLET BY MOUTH EVERY DAY 3/28/17  Yes Jg Packer MD   carvedilol (COREG) 6.25 mg tablet Take 1 Tab by mouth two (2) times daily (with meals). 3/23/17  Yes Jg Packer MD   atorvastatin (LIPITOR) 10 mg tablet TAKE 1 TAB BY MOUTH DAILY. 2/17/17  Yes Jessica Collins MD   ADVAIR DISKUS 250-50 mcg/dose diskus inhaler INHALE 1 PUFF TWICE A DAY 8/30/16  Yes Jessica Collins MD   albuterol-ipratropium (DUO-NEB) 2.5 mg-0.5 mg/3 ml nebu 3 mL by Nebulization route four (4) times daily. Patient taking differently: 3 mL by Nebulization route as needed. 5/10/16  Yes Jessica Collins MD   albuterol (PROVENTIL HFA, VENTOLIN HFA, PROAIR HFA) 90 mcg/actuation inhaler Take 1 Puff by inhalation every six (6) hours as needed for Wheezing. 4/19/16  Yes Jessica Collins MD   furosemide (LASIX) 20 mg tablet 1 tablet daily  Patient taking differently: 20 mg as needed. 1 tablet daily 7/14/14  Yes Jessica Collins MD   tiotropium [facility]) 18 mcg inhalation capsule Take 1 Cap by inhalation daily. Patient taking differently: Take 1 Cap by inhalation as needed. 7/14/14  Yes Jessica Collins MD   aspirin 81 mg tablet Take 1 Tab by mouth daily. 1/9/14  Yes Jessica Collins MD   cpap machine kit by Does Not Apply route nightly. Yes Historical Provider   multivitamin (ONE A DAY) tablet Take 1 Tab by mouth daily.    Yes Historical Provider          Allergies   Allergen Reactions    Cetirizine Hives     Other reaction(s): Hives    Amlodipine Swelling     lower extremity swelling    Coreg [Carvedilol] Other (comments)     sweating    Erythromycin Hives    Verapamil Other (comments)     Patient unable to take verapamil for rate control secondary to peripheral edema/maliase/feeling of being high. Review of Systems   Musculoskeletal: Positive for neck pain. R sided neck pain, running from base of head to the shoulder, no trauma, has been present on/off for few weeks, has tried Tylenol w/ some relief         Objective:   Physical Exam   Constitutional: No distress. HENT:   Right Ear: Tympanic membrane is not erythematous and not bulging. No middle ear effusion. Left Ear: Tympanic membrane is not erythematous and not bulging. No middle ear effusion. Nose: Mucosal edema and rhinorrhea present. Right sinus exhibits maxillary sinus tenderness. Right sinus exhibits no frontal sinus tenderness. Left sinus exhibits no maxillary sinus tenderness and no frontal sinus tenderness. Mouth/Throat: Uvula is midline and mucous membranes are normal. No oropharyngeal exudate or posterior oropharyngeal erythema. Eyes: Right eye exhibits no chemosis and no discharge. Left eye exhibits no chemosis and no discharge. Left conjunctiva is injected. Left conjunctiva has no hemorrhage. No scleral icterus. Neck: Neck supple. Cardiovascular: Normal heart sounds. An irregularly irregular rhythm present. No murmur heard. Pulmonary/Chest: Effort normal and breath sounds normal. She has no wheezes. She has no rales. Musculoskeletal:        Cervical back: She exhibits decreased range of motion (limted ROM to the right side), tenderness (left paraspinal) and spasm. She exhibits no pain. Lymphadenopathy:     She has no cervical adenopathy. Visit Vitals    /82    Pulse 92    Temp 99.5 °F (37.5 °C) (Oral)    Resp 20    Ht 5' 3\" (1.6 m)    Wt 181 lb 3.2 oz (82.2 kg)    SpO2 94%    BMI 32.1 kg/m2         Disclaimer:  Advised her to call back or return to office if symptoms worsen/change/persist.  Discussed expected course/resolution/complications of diagnosis in detail with patient.     Medication risks/benefits/costs/interactions/alternatives discussed with patient. She was given an after visit summary which includes diagnoses, current medications, & vitals. She expressed understanding with the diagnosis and plan.         Phan Lazar MD

## 2017-06-27 RX ORDER — CARVEDILOL 6.25 MG/1
TABLET ORAL
Qty: 60 TAB | Refills: 6 | Status: SHIPPED | OUTPATIENT
Start: 2017-06-27 | End: 2017-11-17

## 2017-06-27 NOTE — TELEPHONE ENCOUNTER
Request for coreg 6.25mg twice a day. Last office visit 4/27/17, next office visit 10/26/17.  Refills per verbal order from Dr. Chanel Kwok.

## 2017-08-31 RX ORDER — FLUTICASONE PROPIONATE AND SALMETEROL 50; 250 UG/1; UG/1
POWDER RESPIRATORY (INHALATION)
Qty: 3 EACH | Refills: 1 | Status: SHIPPED | OUTPATIENT
Start: 2017-08-31 | End: 2017-10-13 | Stop reason: SDUPTHER

## 2017-10-11 RX ORDER — VALSARTAN AND HYDROCHLOROTHIAZIDE 160; 25 MG/1; MG/1
TABLET ORAL
Qty: 90 TAB | Refills: 1 | Status: SHIPPED | OUTPATIENT
Start: 2017-10-11 | End: 2017-11-17 | Stop reason: SDUPTHER

## 2017-10-11 NOTE — TELEPHONE ENCOUNTER
Request for diovan 160/25mg every day. Last office visit 4/27/17, next office visit 10/24/17.  Refills per verbal order from Dr. Elieser Manjarrez.

## 2017-10-13 RX ORDER — FLUTICASONE PROPIONATE AND SALMETEROL 250; 50 UG/1; UG/1
POWDER RESPIRATORY (INHALATION)
Qty: 3 EACH | Refills: 1 | Status: SHIPPED | OUTPATIENT
Start: 2017-10-13 | End: 2018-06-21 | Stop reason: SDUPTHER

## 2017-10-24 ENCOUNTER — OFFICE VISIT (OUTPATIENT)
Dept: CARDIOLOGY CLINIC | Age: 72
End: 2017-10-24

## 2017-10-24 VITALS
WEIGHT: 188 LBS | HEIGHT: 63 IN | OXYGEN SATURATION: 99 % | HEART RATE: 88 BPM | DIASTOLIC BLOOD PRESSURE: 90 MMHG | RESPIRATION RATE: 16 BRPM | SYSTOLIC BLOOD PRESSURE: 130 MMHG | BODY MASS INDEX: 33.31 KG/M2

## 2017-10-24 DIAGNOSIS — I48.19 PERSISTENT ATRIAL FIBRILLATION (HCC): Primary | ICD-10-CM

## 2017-10-24 DIAGNOSIS — I42.2 APICAL VARIANT HYPERTROPHIC CARDIOMYOPATHY (HCC): ICD-10-CM

## 2017-10-24 DIAGNOSIS — I05.9 MITRAL VALVE DISORDER: ICD-10-CM

## 2017-10-24 DIAGNOSIS — I50.32 CHRONIC DIASTOLIC HEART FAILURE (HCC): ICD-10-CM

## 2017-10-24 DIAGNOSIS — I27.20 MILD PULMONARY HYPERTENSION (HCC): ICD-10-CM

## 2017-10-24 NOTE — MR AVS SNAPSHOT
Visit Information Date & Time Provider Department Dept. Phone Encounter #  
 10/24/2017 11:00 AM Shantel Rouse MD [facility] [phone] [id number] Your Appointments 11/13/2017  2:40 PM  
Any with Monie Abarca MD  
[facility] ([address]) Appt Note: Yearly F/U, bring machine. [address] [phone]  
[phone]  
  
   
 [address] [place] [id number]  
  
    
 11/17/2017  1:15 PM  
ROUTINE CARE with Hayley Robison MD  
[place] [place] [address] Internal Medicine [address]) Appt Note: f/u 6m  
 [address] [facility] [address] [facility] [address]  
[address] Upcoming Health Maintenance Date Due FOBT Q 1 YEAR, 18+ 6/8/1961 DTaP/Tdap/Td series (1 - Tdap) 6/8/1964 ZOSTER VACCINE AGE 60> 4/8/2003 Pneumococcal 65+ Low/Medium Risk (2 of 2 - PPSV23) 1/27/2017 HEMOGLOBIN A1C Q6M 5/9/2017 LIPID PANEL Q1 5/27/2017 EYE EXAM RETINAL OR DILATED Q1 6/23/2017 MICROALBUMIN Q1 11/9/2017 BREAST CANCER SCRN MAMMOGRAM 11/19/2017 FOOT EXAM Q1 5/17/2018 MEDICARE YEARLY EXAM 5/18/2018 GLAUCOMA SCREENING Q2Y 6/23/2018 COLONOSCOPY 11/6/2025 Allergies as of 10/24/2017  Review Complete On: 10/24/2017 By: Shantel Rouse MD  
  
 Severity Noted Reaction Type Reactions Cetirizine Medium 10/18/2016    Hives Other reaction(s): Hives Amlodipine  01/03/2014   Side Effect Swelling  
 lower extremity swelling Coreg [Carvedilol]  04/29/2014   Side Effect Other (comments)  
 sweating Erythromycin  09/29/2010    Hives Verapamil Low 01/31/2012   Not Verified Other (comments) Patient unable to take verapamil for rate control secondary to peripheral edema/maliase/feeling of being high. Current Immunizations  Reviewed on 6/6/2017 Name Date Influenza High Dose Vaccine PF 8/31/2017 12:00 AM, 11/6/2015 Influenza Vaccine Split 1/28/2012 12:03 AM, 3/31/2011  2:45 AM  
 ZZZ-RETIRED (DO NOT USE) Pneumococcal Vaccine (Unspecified Type) 1/27/2012 11:56 PM  
  
 Not reviewed this visit You Were Diagnosed With   
  
 Codes Comments Persistent atrial fibrillation (HCC)    -  Primary ICD-10-CM: I48.1 ICD-9-CM: 427.31 Chronic diastolic heart failure (HCC)     ICD-10-CM: I50.32 
ICD-9-CM: 428.32 Apical variant hypertrophic cardiomyopathy ([facility] [address]     ICD-10-CM: I42.2 ICD-9-CM: 425.18 Mild pulmonary hypertension     ICD-10-CM: I27.20 ICD-9-CM: 416.8 Mitral valve disorder     ICD-10-CM: I05.9 ICD-9-CM: 394.9 Vitals BP Pulse Resp Height(growth percentile) Weight(growth percentile) SpO2  
 130/90 (BP 1 Location: Left arm, BP Patient Position: Sitting) 88 16 5' 3\" (1.6 m) 188 lb (85.3 kg) 99% BMI OB Status Smoking Status 33.3 kg/m2 Hysterectomy Former Smoker Vitals History BMI and BSA Data Body Mass Index Body Surface Area  
 33.3 kg/m 2 1.95 m 2 Preferred Pharmacy Pharmacy Name Phone [id number] [phone] Your Updated Medication List  
  
   
This list is accurate as of: 10/24/17 11:42 AM.  Always use your most recent med list.  
  
  
  
  
 albuterol 90 mcg/actuation inhaler Commonly known as:  PROVENTIL HFA, VENTOLIN HFA, PROAIR HFA Take 1 Puff by inhalation every six (6) hours as needed for Wheezing. albuterol-ipratropium 2.5 mg-0.5 mg/3 ml Nebu Commonly known as:  DUO-NEB  
3 mL by Nebulization route four (4) times daily. aspirin 81 mg tablet Take 1 Tab by mouth daily. atorvastatin 10 mg tablet Commonly known as:  LIPITOR  
TAKE 1 TAB BY MOUTH DAILY. carvedilol 6.25 mg tablet Commonly known as:  COREG  
TAKE 1 TAB BY MOUTH TWO (2) TIMES DAILY (WITH MEALS). cpap machine kit by Does Not Apply route nightly. digoxin 0.125 mg tablet Commonly known as:  LANOXIN  
TAKE 1 TABLET BY MOUTH EVERY DAY  
  
 ELIQUIS 5 mg tablet Generic drug:  apixaban TAKE 1 TAB BY MOUTH TWO (2) TIMES A DAY. fluticasone-salmeterol 250-50 mcg/dose diskus inhaler Commonly known as:  ADVAIR DISKUS INHALE 1 PUFF BY MOUTH TWICE A DAY. furosemide 20 mg tablet Commonly known as:  LASIX  
1 tablet daily  
  
 multivitamin tablet Commonly known as:  ONE A DAY Take 1 Tab by mouth daily. tiotropium 18 mcg inhalation capsule Commonly known as:  Ceci Rashaun Take 1 Cap by inhalation daily. tiZANidine 2 mg tablet Commonly known as:  Selinda Lovings Take 1 Tab by mouth nightly as needed. triamcinolone acetonide 0.1 % topical cream  
Commonly known as:  KENALOG  
APPLY A THIN LAYER TO AFFECTED AREA(S) AS DIRECTED TWICE A DAY AS NEEDED  
  
 valsartan-hydroCHLOROthiazide 160-25 mg per tablet Commonly known as:  DIOVAN-HCT  
TAKE 1 TABLET BY MOUTH EVERY DAY We Performed the Following AMB POC EKG ROUTINE W/ 12 LEADS, INTER & REP [71365 CPT(R)] Patient Instructions Follow up with Dr Kalyani Escobedo in 6 months. Introducing [facility]! [employer] introduces [employer] App patient portal. Now you can access parts of your medical record, email your doctor's office, and request medication refills online. 1. In your internet browser, go to [email or web address] 2. Click on the First Time User? Click Here link in the Sign In box. You will see the New Member Sign Up page. 3. Enter your [employer] Access Code exactly as it appears below. You will not need to use this code after youve completed the sign-up process. If you do not sign up before the expiration date, you must request a new code. · [employer] App Access Code: [email or web address] Expires: 1/18/2018  3:19 PM 
 
4.  Enter the last four digits of your Social Security Number (xxxx) and Date of Birth (mm/dd/yyyy) as indicated and click Submit. You will be taken to the next sign-up page. 5. Create a [employer] ID. This will be your [employer] login ID and cannot be changed, so think of one that is secure and easy to remember. 6. Create a [employer] password. You can change your password at any time. 7. Enter your Password Reset Question and Answer. This can be used at a later time if you forget your password. 8. Enter your e-mail address. You will receive e-mail notification when new information is available in [address] 9. Click Sign Up. You can now view and download portions of your medical record. 10. Click the Download Summary menu link to download a portable copy of your medical information. If you have questions, please visit the Frequently Asked Questions section of the [employer] website. Remember, [employer] is NOT to be used for urgent needs. For medical emergencies, dial 911. Now available from your iPhone and Android! Please provide this summary of care documentation to your next provider. Your primary care clinician is listed as Abner Bledsoe. If you have any questions after today's visit, please call [phone].

## 2017-10-24 NOTE — PROGRESS NOTES
Mait Munoz     1945       Dejon Hayward MD, [facility]  Date of Visit-10/24/2017   PCP is Aaron Centeno MD   [facility] [place]  [facility]  HPI:  Mati Munoz is a 74 y.o. female   Follow up of apical  hypertrophy. Echo last visit shows good LV function at 70% on preliminary with apical hypertrophy unchanged. Gets brief SOB. She has persistent AF. Unable to take coreg due to fatigue    Overall today feels at baseline , says she is getting older and feels some back ache and mild swelling in legs with fatigue    EKG afib with LVH with strain     Assessment/Plan:     Apical hypertrophy cardiomyopathy  -as seen on previous MRI and echo   -started Coreg but got fatigue  -EF was 86% on MRI previously  -there was no BUSTER and no scar     Persistent AF  -amiodarone did not work   -Coreg with low dose digoxin, is doing well  -continue oral anticoagulant to prevent stroke with the Eliquis    Mild diastolic dysfunction   -compensated on Lasix as needed   HTN  -at goal     Follow up in 6 months   OVERALL CV status is stable      Impression:   1. Persistent atrial fibrillation ([address]    2. Chronic diastolic heart failure (HCC)    3. Apical variant hypertrophic cardiomyopathy ([address]    4. Mild pulmonary hypertension    5. Mitral valve disorder       Cardiac History:   Chronic atrial fibrillation   Diastolic CHF    ECHO 2-88-99= ef 55-60%, biatrial enlargement  ECHO 3- EF 70%, AK of apex with apical hypertrophy, mild TR,LAE, Pulm Htn    Admit Jan 2012 with asthma and May 2013; Chronic airway dz    Cardiac MRI     1. Apical hypertrophic cardiomyopathy. The apical septal wall measures 20   mm. The apical lateral and mid inferolateral wall measures 26 mm. Complete   obliteration of the LV apical cavity. [place] like shape of the LV cavity   typical of the apical hypertrophic cardiomyopathy.  Hyperdynamic left   ventricular systolic function with end-systolic cavity obliteration. 3-D   LVEF 86%. There is no systolic anterior motion of the mitral apparatus or no   LV outflow tract obstruction. 2. Normal right ventricular size and systolic function. RVEF 60%. 3. Mild 1+ mitral regurgitation. 4. Moderate 2+ tricuspid regurgitation. 5. Normal rest myocardial perfusion on first pass perfusion imaging. 6. On EGE and LGE imaging, there is no areas myocardial enhancement to   suggest any myocardial scar, recent or old myocardial infarction,   infiltration or inflammation. There is no features of myocardial sarcoidosis   or amyloidosis. There is no features of inflammatory cardiomyopathy such as   myocarditis. 7. Normal pleura and pericardium. Trace anterior and posterior pericardial   effusion. 8. Markedly dilated RA and LA. ROS-except as noted above. . A complete cardiac and respiratory are reviewed and negative except as above ; Resp-denies wheezing  or productive cough,. Const- No unusual weight loss or fever; Neuro-no recent seizure or CVA ; GI- No BRBPR, abdom pain, bloating ; - no  hematuria   see supplement sheet, initialed and to be scanned by staff  Past Medical History:   Diagnosis Date    Apical variant hypertrophic cardiomyopathy ([facility] [address]     Chronic diastolic heart failure ([address]     HTN (hypertension)     Hx of mammogram     LVH (left ventricular hypertrophy) due to hypertensive disease     Mild pulmonary hypertension 9/13/2012    Obstructive sleep apnea (adult) (pediatric) 07-    AHI: 5.0 per hour    Pneumonia 2013 admit [facility]      Social Hx= reports that she quit smoking about 21 years ago. She has a 2.00 pack-year smoking history. She has never used smokeless tobacco. She reports that she does not drink alcohol or use illicit drugs.      Exam and Labs:    Visit Vitals    /90 (BP 1 Location: Left arm, BP Patient Position: Sitting)    Pulse 88    Resp 16    Ht 5' 3\" (1.6 m)    Wt 188 lb (85.3 kg)    SpO2 99%    BMI 33.3 kg/m2 Constitutional:  NAD, comfortable  Head: NC,AT. Eyes: No scleral icterus. Neck:  Neck supple. No JVD present. Throat: moist mucous membranes. Chest: Effort normal & normal respiratory excursion . Neurological: alert, conversant and oriented . Skin: Skin is not cold. No obvious systemic rash noted. Not diaphoretic. No erythema. Psychiatric:  Grossly normal mood and affect. Behavior appears normal. Extremities:  no clubbing or cyanosis. Abdomen: non distended    Lungs:breath sounds normal. No stridor. distress, wheezes or  Rales. Heart:    Irregularly irregular  normal S1, S2, no murmurs, rubs, clicks or gallops , PMI non displaced. Edema: Edema is none. Lab Results   Component Value Date/Time    Cholesterol, total 111 05/27/2016 09:33 AM    HDL Cholesterol 38 05/27/2016 09:33 AM    LDL, calculated 56 05/27/2016 09:33 AM    Triglyceride 85 05/27/2016 09:33 AM    CHOL/HDL Ratio 5.1 05/12/2013 04:20 AM     No results found for this or any previous visit. Lab Results   Component Value Date/Time    Sodium 143 11/09/2016 01:15 PM    Potassium 4.0 11/09/2016 01:15 PM    Chloride 100 11/09/2016 01:15 PM    CO2 28 11/09/2016 01:15 PM    Anion gap 6 05/15/2013 07:10 AM    Glucose 99 11/09/2016 01:15 PM    BUN 15 11/09/2016 01:15 PM    Creatinine 0.67 11/09/2016 01:15 PM    BUN/Creatinine ratio 22 11/09/2016 01:15 PM    GFR est  11/09/2016 01:15 PM    GFR est non-AA 87 11/09/2016 01:15 PM    Calcium 9.5 11/09/2016 01:15 PM      Wt Readings from Last 3 Encounters:   10/24/17 188 lb (85.3 kg)   06/06/17 181 lb 3.2 oz (82.2 kg)   05/17/17 182 lb (82.6 kg)      BP Readings from Last 3 Encounters:   10/24/17 130/90   06/06/17 120/82   05/17/17 120/76        Current Outpatient Prescriptions   Medication Sig    fluticasone-salmeterol (ADVAIR DISKUS) 250-50 mcg/dose diskus inhaler INHALE 1 PUFF BY MOUTH TWICE A DAY.     valsartan-hydroCHLOROthiazide (DIOVAN-HCT) 160-25 mg per tablet TAKE 1 TABLET BY MOUTH EVERY DAY    triamcinolone acetonide (KENALOG) 0.1 % topical cream APPLY A THIN LAYER TO AFFECTED AREA(S) AS DIRECTED TWICE A DAY AS NEEDED    ELIQUIS 5 mg tablet TAKE 1 TAB BY MOUTH TWO (2) TIMES A DAY.  digoxin (LANOXIN) 0.125 mg tablet TAKE 1 TABLET BY MOUTH EVERY DAY    atorvastatin (LIPITOR) 10 mg tablet TAKE 1 TAB BY MOUTH DAILY.  albuterol-ipratropium (DUO-NEB) 2.5 mg-0.5 mg/3 ml nebu 3 mL by Nebulization route four (4) times daily. (Patient taking differently: 3 mL by Nebulization route as needed.)    albuterol (PROVENTIL HFA, VENTOLIN HFA, PROAIR HFA) 90 mcg/actuation inhaler Take 1 Puff by inhalation every six (6) hours as needed for Wheezing.  furosemide (LASIX) 20 mg tablet 1 tablet daily (Patient taking differently: 20 mg as needed. 1 tablet daily)    tiotropium (SPIRIVA) 18 mcg inhalation capsule Take 1 Cap by inhalation daily. (Patient taking differently: Take 1 Cap by inhalation as needed.)    aspirin 81 mg tablet Take 1 Tab by mouth daily.  cpap machine kit by Does Not Apply route nightly.  multivitamin (ONE A DAY) tablet Take 1 Tab by mouth daily.  carvedilol (COREG) 6.25 mg tablet TAKE 1 TAB BY MOUTH TWO (2) TIMES DAILY (WITH MEALS).  tiZANidine (ZANAFLEX) 2 mg tablet Take 1 Tab by mouth nightly as needed. No current facility-administered medications for this visit. Impression see above.

## 2017-10-25 ENCOUNTER — DOCUMENTATION ONLY (OUTPATIENT)
Dept: SLEEP MEDICINE | Age: 72
End: 2017-10-25

## 2017-10-25 NOTE — PROGRESS NOTES
Patient scheduled for yearly follow-up on 11/13/2017 2:40pm with Dr. Libia Gomez. Left voicemail message to call the office to reschedule. A letter has also been mailed to her today requesting the same.

## 2017-11-17 ENCOUNTER — OFFICE VISIT (OUTPATIENT)
Dept: INTERNAL MEDICINE CLINIC | Age: 72
End: 2017-11-17

## 2017-11-17 VITALS
TEMPERATURE: 97.8 F | HEART RATE: 96 BPM | SYSTOLIC BLOOD PRESSURE: 144 MMHG | RESPIRATION RATE: 12 BRPM | BODY MASS INDEX: 32.78 KG/M2 | HEIGHT: 63 IN | WEIGHT: 185 LBS | OXYGEN SATURATION: 97 % | DIASTOLIC BLOOD PRESSURE: 94 MMHG

## 2017-11-17 DIAGNOSIS — E11.9 CONTROLLED TYPE 2 DIABETES MELLITUS WITHOUT COMPLICATION, WITHOUT LONG-TERM CURRENT USE OF INSULIN (HCC): Primary | ICD-10-CM

## 2017-11-17 DIAGNOSIS — J41.0 SIMPLE CHRONIC BRONCHITIS (HCC): ICD-10-CM

## 2017-11-17 DIAGNOSIS — E66.9 OBESITY (BMI 30.0-34.9): ICD-10-CM

## 2017-11-17 DIAGNOSIS — M54.9 UPPER BACK PAIN: ICD-10-CM

## 2017-11-17 DIAGNOSIS — I10 ESSENTIAL HYPERTENSION: ICD-10-CM

## 2017-11-17 DIAGNOSIS — F41.9 ANXIETY: ICD-10-CM

## 2017-11-17 DIAGNOSIS — E78.00 PURE HYPERCHOLESTEROLEMIA: ICD-10-CM

## 2017-11-17 RX ORDER — VALSARTAN AND HYDROCHLOROTHIAZIDE 320; 25 MG/1; MG/1
1 TABLET, FILM COATED ORAL DAILY
Qty: 90 TAB | Refills: 1 | Status: SHIPPED | OUTPATIENT
Start: 2017-11-17 | End: 2018-05-24 | Stop reason: SDUPTHER

## 2017-11-17 NOTE — PROGRESS NOTES
Ivelisse Butterfield is a 74 y.o. female who was seen in clinic today (11/17/2017). Assessment & Plan:  Diagnoses and all orders for this visit:    1. Controlled type 2 diabetes mellitus without complication, without long-term current use of insulin ([facility]- well controlled, long term diabetic complications discussed and continue current plan pending review of labs. -     METABOLIC PANEL, COMPREHENSIVE  -     CBC W/O DIFF  -     LIPID PANEL  -     HEMOGLOBIN A1C WITH EAG  -     MICROALBUMIN, UR, RAND W/ MICROALBUMIN/CREA RATIO  -      DIABETES FOOT EXAM  -     valsartan-hydroCHLOROthiazide (DIOVAN-HCT) 320-25 mg per tablet; Take 1 Tab by mouth daily. 2. Simple chronic bronchitis ([facility]- asymptomatic, no changes to current plan     3. Essential hypertension- poorly controlled, BB recently stopped, will increase dose of ARB as she has allergy to CCB.  -     METABOLIC PANEL, COMPREHENSIVE  -     CBC W/O DIFF  -     valsartan-hydroCHLOROthiazide (DIOVAN-HCT) 320-25 mg per tablet; Take 1 Tab by mouth daily. 4. Obesity (BMI 30.0-34.9)- stable, needs improvement, I have reviewed/discussed the above normal BMI with the patient. I have recommended the following interventions: encourage exercise and lifestyle education regarding diet. 5. Pure hypercholesterolemia- well controlled previously, continue current treatment pending review of labs   -     METABOLIC PANEL, COMPREHENSIVE  -     LIPID PANEL    6. Upper back pain- this is a new problem, symptoms are: intermittent, differential dx reviewed with the patient, sounds muscular. Will treat with: heat, rest, stretching. See AVS, Red flags were reviewed with the patient to RTC or notify me, expected time course for resolution reviewed.      7. Anxiety- new problem, not ideally controlled but not severe, reviewed treatment options with her, reviewed life style changes to help improve mood, recommended to see a counselor, reviewed med options but do not think it is needed & she declined. Red flags and expectations were reviewed & discussed with the her. Follow-up Disposition:  Return in about 6 weeks (around 12/29/2017) for Nurse visit for blood pressure check, then 6 months with me.       ----------------------------------------------------------------------    Subjective:  Odilon Driscoll was seen today for Diabetes and COPD    Pulmonary Review  The patient is being seen for COPD. Current limitations in activity: none. Coughing when present is described as none. Wheezing is not present. Regimen compliance: using as directed. She is not using albuterol.      Endocrine Review  She is seen for diabetes. Since last visit she reports no significant changes. She reports medication compliance: n/a - patient not on medications (diet controlled). Diabetic diet compliance: compliant all of the time. Lab review: labs reviewed and discussed with patient. Eye exam: UTD.     Cardiovascular Review  The patient has hypertension and hyperlipidemia. Since last visit cardiology stopped her BB due to fatigue. She reports she is just starting to feel better (meds stopped 1 month ago). She reports taking medications as instructed, she reports some off balance feelign since starting coreg. Home BP monitoring is not done regularly. Diet and Lifestyle: generally follows a low fat low cholesterol diet, generally follows a low sodium diet, sedentary.  Lab review: labs reviewed and discussed with patient.        ------------------------------------------------------------------------------  Brief Labs:  Lab Results   Component Value Date/Time    Sodium 143 11/09/2016 01:15 PM    Potassium 4.0 11/09/2016 01:15 PM    Creatinine 0.67 11/09/2016 01:15 PM    Cholesterol, total 111 05/27/2016 09:33 AM    HDL Cholesterol 38 05/27/2016 09:33 AM    LDL, calculated 56 05/27/2016 09:33 AM    Triglyceride 85 05/27/2016 09:33 AM    Hemoglobin A1c 6.9 11/09/2016 01:15 PM ------------------------------------------------------------------------------      Prior to Admission medications    Medication Sig Start Date End Date Taking? Authorizing Provider   fluticasone-salmeterol (ADVAIR DISKUS) 250-50 mcg/dose diskus inhaler INHALE 1 PUFF BY MOUTH TWICE A DAY. 10/13/17  Yes Odtete Navas MD   valsartan-hydroCHLOROthiazide (DIOVAN-HCT) 160-25 mg per tablet TAKE 1 TABLET BY MOUTH EVERY DAY 10/11/17  Yes Horace Gitelman, MD   tiZANidine (ZANAFLEX) 2 mg tablet Take 1 Tab by mouth nightly as needed. 6/6/17  Yes Odette Navas MD   triamcinolone acetonide (KENALOG) 0.1 % topical cream APPLY A THIN LAYER TO AFFECTED AREA(S) AS DIRECTED TWICE A DAY AS NEEDED 5/17/17  Yes Odette Navas MD   ELIQUIS 5 mg tablet TAKE 1 TAB BY MOUTH TWO (2) TIMES A DAY. 5/15/17  Yes Horace Gitelman, MD   digoxin (LANOXIN) 0.125 mg tablet TAKE 1 TABLET BY MOUTH EVERY DAY 4/26/17  Yes Horace Gitelman, MD   atorvastatin (LIPITOR) 10 mg tablet TAKE 1 TAB BY MOUTH DAILY. 2/17/17  Yes Odette Navas MD   albuterol-ipratropium (DUO-NEB) 2.5 mg-0.5 mg/3 ml nebu 3 mL by Nebulization route four (4) times daily. Patient taking differently: 3 mL by Nebulization route as needed. 5/10/16  Yes Odette Navas MD   albuterol (PROVENTIL HFA, VENTOLIN HFA, PROAIR HFA) 90 mcg/actuation inhaler Take 1 Puff by inhalation every six (6) hours as needed for Wheezing. 4/19/16  Yes Odette Navas MD   furosemide (LASIX) 20 mg tablet 1 tablet daily  Patient taking differently: 20 mg as needed. 1 tablet daily 7/14/14  Yes Odette Navas MD   tiotropium [facility]) 18 mcg inhalation capsule Take 1 Cap by inhalation daily. Patient taking differently: Take 1 Cap by inhalation as needed. 7/14/14  Yes Odette Navas MD   aspirin 81 mg tablet Take 1 Tab by mouth daily. 1/9/14  Yes Odette Navas MD   cpap machine kit by Does Not Apply route nightly.      Yes Historical Provider   multivitamin (ONE A DAY) tablet Take 1 Tab by mouth daily. Yes Historical Provider          Allergies   Allergen Reactions    Cetirizine Hives     Other reaction(s): Hives    Amlodipine Swelling     lower extremity swelling    Coreg [Carvedilol] Other (comments)     sweating    Erythromycin Hives    Verapamil Other (comments)     Patient unable to take verapamil for rate control secondary to peripheral edema/maliase/feeling of being high. Review of Systems   Constitutional: Negative for malaise/fatigue and weight loss. Respiratory: Negative for cough and shortness of breath. Cardiovascular: Negative for chest pain, palpitations and leg swelling. Gastrointestinal: Negative for abdominal pain, constipation, diarrhea, heartburn, nausea and vomiting. Genitourinary: Negative for frequency. Musculoskeletal: Positive for back pain. Negative for joint pain and myalgias. Sharp pain, started 1 month ago, mid back, no h/o trauma, on/off, no radiation   Skin: Negative for rash. Neurological: Negative for tingling, sensory change, focal weakness and headaches. Psychiatric/Behavioral: Negative for depression. The patient is nervous/anxious. The patient does not have insomnia. Increase in anxiety recently, just generalized worrying, nothing specific. At times feels some chest tightness when she starts to worry         Objective:   Physical Exam   Constitutional: She appears well-developed. No distress. obese   HENT:   Mouth/Throat: Mucous membranes are normal.   Eyes: Conjunctivae and lids are normal. No scleral icterus. Neck: Neck supple. No thyromegaly present. Cardiovascular: Regular rhythm and normal heart sounds. No murmur heard. Pulmonary/Chest: Effort normal and breath sounds normal. She has no wheezes. She has no rales. Abdominal: Soft. Bowel sounds are normal. She exhibits no mass. There is no hepatosplenomegaly. There is no tenderness.    Musculoskeletal: She exhibits no edema.        Thoracic back: She exhibits no tenderness, no bony tenderness, no deformity, no pain and no spasm. Lumbar back: She exhibits no tenderness, no bony tenderness, no pain and no spasm. Lymphadenopathy:     She has no cervical adenopathy. Neurological:   Monofilament intact bilaterally. Pulses R: 2+ and L: 2+. No open wounds. No skin lesions. Skin: No rash noted. Psychiatric: She has a normal mood and affect. Her behavior is normal.         Visit Vitals    BP (!) 144/94    Pulse 96    Temp 97.8 °F (36.6 °C) (Oral)    Resp 12    Ht 5' 3\" (1.6 m)    Wt 185 lb (83.9 kg)    SpO2 97%    BMI 32.77 kg/m2         Disclaimer:  Advised her to call back or return to office if symptoms worsen/change/persist.  Discussed expected course/resolution/complications of diagnosis in detail with patient. Medication risks/benefits/costs/interactions/alternatives discussed with patient. She was given an after visit summary which includes diagnoses, current medications, & vitals. She expressed understanding with the diagnosis and plan.         Larisa Sol MD

## 2017-11-17 NOTE — MR AVS SNAPSHOT
Visit Information Date & Time Provider Department Dept. Phone Encounter #  
 11/17/2017  1:15 PM Goyo Franz, [facility] Internal Medicine [phone] [id number] Follow-up Instructions Return in about 6 weeks (around 12/29/2017) for Nurse visit for blood pressure check, then 6 months with me. Your Appointments 11/21/2017 11:40 AM  
Any with Theron Martell MD  
[address] [facility] ([facility]) Appt Note: Yearly F/U, bring machine.; Yearly F/U, bring machine. [address] [phone]  
[phone]  
  
   
 [address] [id number]  
  
    
 4/24/2018 11:20 AM  
ESTABLISHED PATIENT with Juvencio Lyons MD  
[facility] Appt Note: 6 mo [address]  
[address] [address] Upcoming Health Maintenance Date Due DTaP/Tdap/Td series (1 - Tdap) 6/8/1964 ZOSTER VACCINE AGE 60> 4/8/2003 Pneumococcal 65+ Low/Medium Risk (2 of 2 - PPSV23) 1/27/2017 HEMOGLOBIN A1C Q6M 5/9/2017 LIPID PANEL Q1 5/27/2017 EYE EXAM RETINAL OR DILATED Q1 6/23/2017 MICROALBUMIN Q1 11/9/2017 BREAST CANCER SCRN MAMMOGRAM 11/19/2017 FOOT EXAM Q1 5/17/2018 MEDICARE YEARLY EXAM 5/18/2018 GLAUCOMA SCREENING Q2Y 6/23/2018 Allergies as of 11/17/2017  Review Complete On: 11/17/2017 By: Goyo rFanz MD  
  
 Severity Noted Reaction Type Reactions Cetirizine Medium 10/18/2016    Hives Other reaction(s): Hives Amlodipine  01/03/2014   Side Effect Swelling  
 lower extremity swelling Coreg [Carvedilol]  04/29/2014   Side Effect Other (comments)  
 sweating Erythromycin  09/29/2010    Hives Verapamil Low 01/31/2012   Not Verified Other (comments)  Patient unable to take verapamil for rate control secondary to peripheral edema/maliase/feeling of being high. Current Immunizations  Reviewed on 11/17/2017 Name Date Influenza High Dose Vaccine PF 8/31/2017 12:00 AM, 11/6/2015 Influenza Vaccine Split 1/28/2012 12:03 AM, 3/31/2011  2:45 AM  
 ZZZ-RETIRED (DO NOT USE) Pneumococcal Vaccine (Unspecified Type) 1/27/2012 11:56 PM  
  
 Reviewed by Grace Rod RN on 11/17/2017 at  1:21 PM  
You Were Diagnosed With   
  
 Codes Comments Controlled type 2 diabetes mellitus without complication, without long-term current use of insulin ([facility]    -  Primary ICD-10-CM: E11.9 ICD-9-CM: 250.00 Simple chronic bronchitis (HCC)     ICD-10-CM: J41.0 ICD-9-CM: 491.0 Essential hypertension     ICD-10-CM: I10 
ICD-9-CM: 401.9 Obesity (BMI 30.0-34.9)     ICD-10-CM: Q52.1 ICD-9-CM: 278.00 Pure hypercholesterolemia     ICD-10-CM: E78.00 ICD-9-CM: 272.0 Upper back pain     ICD-10-CM: M54.9 ICD-9-CM: 724.5 Vitals BP Pulse Temp Resp Height(growth percentile) Weight(growth percentile) (!) 144/94 96 97.8 °F (36.6 °C) (Oral) 12 5' 3\" (1.6 m) 185 lb (83.9 kg) SpO2 BMI OB Status Smoking Status 97% 32.77 kg/m2 Hysterectomy Former Smoker BMI and BSA Data Body Mass Index Body Surface Area 32.77 kg/m 2 1.93 m 2 Preferred Pharmacy Pharmacy Name Phone [id number] [place] [phone] Your Updated Medication List  
  
   
This list is accurate as of: 11/17/17  1:50 PM.  Always use your most recent med list.  
  
  
  
  
 albuterol 90 mcg/actuation inhaler Commonly known as:  PROVENTIL HFA, VENTOLIN HFA, PROAIR HFA Take 1 Puff by inhalation every six (6) hours as needed for Wheezing. albuterol-ipratropium 2.5 mg-0.5 mg/3 ml Nebu Commonly known as:  DUO-NEB  
3 mL by Nebulization route four (4) times daily. aspirin 81 mg tablet Take 1 Tab by mouth daily. atorvastatin 10 mg tablet Commonly known as:  LIPITOR  
TAKE 1 TAB BY MOUTH DAILY. cpap machine kit  
by Does Not Apply route nightly. digoxin 0.125 mg tablet Commonly known as:  LANOXIN  
TAKE 1 TABLET BY MOUTH EVERY DAY  
  
 ELIQUIS 5 mg tablet Generic drug:  apixaban TAKE 1 TAB BY MOUTH TWO (2) TIMES A DAY. fluticasone-salmeterol 250-50 mcg/dose diskus inhaler Commonly known as:  ADVAIR DISKUS INHALE 1 PUFF BY MOUTH TWICE A DAY. furosemide 20 mg tablet Commonly known as:  LASIX  
1 tablet daily  
  
 multivitamin tablet Commonly known as:  ONE A DAY Take 1 Tab by mouth daily. tiotropium 18 mcg inhalation capsule Commonly known as:  Sabine Josue Take 1 Cap by inhalation daily. tiZANidine 2 mg tablet Commonly known as:  Corry Ho Take 1 Tab by mouth nightly as needed. triamcinolone acetonide 0.1 % topical cream  
Commonly known as:  KENALOG  
APPLY A THIN LAYER TO AFFECTED AREA(S) AS DIRECTED TWICE A DAY AS NEEDED  
  
 valsartan-hydroCHLOROthiazide 320-25 mg per tablet Commonly known as:  DIOVAN-HCT Take 1 Tab by mouth daily. Prescriptions Sent to Pharmacy Refills  
 valsartan-hydroCHLOROthiazide (DIOVAN-HCT) 320-25 mg per tablet 1 Sig: Take 1 Tab by mouth daily. Class: Normal  
 Pharmacy: [address] [place] [address] Ph #: [phone] Route: Oral  
  
We Performed the Following CBC W/O DIFF [20117 CPT(R)] HEMOGLOBIN A1C WITH EAG [54864 CPT(R)]  DIABETES FOOT EXAM [HM7 Custom] LIPID PANEL [74094 CPT(R)] METABOLIC PANEL, COMPREHENSIVE [14193 CPT(R)] MICROALBUMIN, UR, RAND W/ MICROALBUMIN/CREA RATIO [id number] CPT(R)] Follow-up Instructions Return in about 6 weeks (around 12/29/2017) for Nurse visit for blood pressure check, then 6 months with me. Patient Instructions Pneumonia shot (Prevnar) and Tetanus (tdap) Healthy Upper Back: Exercises Your Care Instructions Here are some examples of exercises for your upper back. Start each exercise slowly. Ease off the exercise if you start to have pain. Your doctor or physical therapist will tell you when you can start these exercises and which ones will work best for you. How to do the exercises Lower neck and upper back stretch 1. Stretch your arms out in front of your body. Clasp one hand on top of your other hand. 2. Gently reach out so that you feel your shoulder blades stretching away from each other. 3. Gently bend your head forward. 4. Hold for 15 to 30 seconds. 5. Repeat 2 to 4 times. Midback stretch If you have knee pain, do not do this exercise. 1. Kneel on the floor, and sit back on your ankles. 2. Lean forward, place your hands on the floor, and stretch your arms out in front of you. Rest your head between your arms. 3. Gently push your chest toward the floor, reaching as far in front of you as possible. 4. Hold for 15 to 30 seconds. 5. Repeat 2 to 4 times. Shoulder rolls 1. Sit comfortably with your feet shoulder-width apart. You can also do this exercise while standing. 2. Roll your shoulders up, then back, and then down in a smooth, circular motion. 3. Repeat 2 to 4 times. Wall push-up 1. Stand against a wall with your feet about 12 to 24 inches back from the wall. If you feel any pain when you do this exercise, stand closer to the wall. 2. Place your hands on the wall slightly wider apart than your shoulders, and lean forward. 3. Gently lean your body toward the wall. Then push back to your starting position. Keep the motion smooth and controlled. 4. Repeat 8 to 12 times. Resisted shoulder blade squeeze For this exercise, you will need elastic exercise material, such as surgical tubing or Thera-Band. 1. Sit or stand, holding the band in both hands in front of you. Keep your elbows close to your sides, bent at a 90-degree angle. Your palms should face up. 2. Squeeze your shoulder blades together, and move your arms to the outside, stretching the band. Be sure to keep your elbows at your sides while you do this. 3. Relax. 4. Repeat 8 to 12 times. Resisted rows For this exercise, you will need elastic exercise material, such as surgical tubing or Thera-Band. 1. Put the band around a solid object, such as a bedpost, at about waist level. Hold one end of the band in each hand. 2. With your elbows at your sides and bent to 90 degrees, pull the band back to move your shoulder blades toward each other. Return to the starting position. 3. Repeat 8 to 12 times. Follow-up care is a key part of your treatment and safety. Be sure to make and go to all appointments, and call your doctor if you are having problems. It's also a good idea to know your test results and keep a list of the medicines you take. Where can you learn more? Go to http://brandy-giorgio.info/. Enter P629 in the search box to learn more about \"Healthy Upper Back: Exercises. \" Current as of: March 21, 2017 Content Version: 11.4 © 6142-0949 Healthwise, [employer]. Care instructions adapted under license by [employer] (which disclaims liability or warranty for this information). If you have questions about a medical condition or this instruction, always ask your healthcare professional. [address] any warranty or liability for your use of this information. Introducing [facility]! Brant Rolle introduces Vaurum patient portal. Now you can access parts of your medical record, email your doctor's office, and request medication refills online. 1. In your internet browser, go to [email or web address] 2. Click on the First Time User? Click Here link in the Sign In box. You will see the New Member Sign Up page. 3. Enter your Vaurum Access Code exactly as it appears below.  You will not need to use this code after youve completed the sign-up process. If you do not sign up before the expiration date, you must request a new code. · [facility] Access Code: [email or web address] Expires: 1/18/2018  2:19 PM 
 
4. Enter the last four digits of your Social Security Number (xxxx) and Date of Birth (mm/dd/yyyy) as indicated and click Submit. You will be taken to the next sign-up page. 5. Create a [facility] ID. This will be your [facility] login ID and cannot be changed, so think of one that is secure and easy to remember. 6. Create a [facility] password. You can change your password at any time. 7. Enter your Password Reset Question and Answer. This can be used at a later time if you forget your password. 8. Enter your e-mail address. You will receive e-mail notification when new information is available in [address] 9. Click Sign Up. You can now view and download portions of your medical record. 10. Click the Download Summary menu link to download a portable copy of your medical information. If you have questions, please visit the Frequently Asked Questions section of the [facility] website. Remember, [facility] is NOT to be used for urgent needs. For medical emergencies, dial 911. Now available from your iPhone and Android! Please provide this summary of care documentation to your next provider. Your primary care clinician is listed as Wendi Galdamez. If you have any questions after today's visit, please call [phone].

## 2017-11-17 NOTE — PATIENT INSTRUCTIONS
Pneumonia shot (Prevnar) and Tetanus (tdap)         Healthy Upper Back: Exercises  Your Care Instructions  Here are some examples of exercises for your upper back. Start each exercise slowly. Ease off the exercise if you start to have pain. Your doctor or physical therapist will tell you when you can start these exercises and which ones will work best for you. How to do the exercises  Lower neck and upper back stretch    1. Stretch your arms out in front of your body. Clasp one hand on top of your other hand. 2. Gently reach out so that you feel your shoulder blades stretching away from each other. 3. Gently bend your head forward. 4. Hold for 15 to 30 seconds. 5. Repeat 2 to 4 times. Midback stretch    If you have knee pain, do not do this exercise. 1. Kneel on the floor, and sit back on your ankles. 2. Lean forward, place your hands on the floor, and stretch your arms out in front of you. Rest your head between your arms. 3. Gently push your chest toward the floor, reaching as far in front of you as possible. 4. Hold for 15 to 30 seconds. 5. Repeat 2 to 4 times. Shoulder rolls    1. Sit comfortably with your feet shoulder-width apart. You can also do this exercise while standing. 2. Roll your shoulders up, then back, and then down in a smooth, circular motion. 3. Repeat 2 to 4 times. Wall push-up    1. Stand against a wall with your feet about 12 to 24 inches back from the wall. If you feel any pain when you do this exercise, stand closer to the wall. 2. Place your hands on the wall slightly wider apart than your shoulders, and lean forward. 3. Gently lean your body toward the wall. Then push back to your starting position. Keep the motion smooth and controlled. 4. Repeat 8 to 12 times. Resisted shoulder blade squeeze    For this exercise, you will need elastic exercise material, such as surgical tubing or Thera-Band. 1. Sit or stand, holding the band in both hands in front of you.  Keep your elbows close to your sides, bent at a 90-degree angle. Your palms should face up. 2. Squeeze your shoulder blades together, and move your arms to the outside, stretching the band. Be sure to keep your elbows at your sides while you do this. 3. Relax. 4. Repeat 8 to 12 times. Resisted rows    For this exercise, you will need elastic exercise material, such as surgical tubing or Thera-Band. 1. Put the band around a solid object, such as a bedpost, at about waist level. Hold one end of the band in each hand. 2. With your elbows at your sides and bent to 90 degrees, pull the band back to move your shoulder blades toward each other. Return to the starting position. 3. Repeat 8 to 12 times. Follow-up care is a key part of your treatment and safety. Be sure to make and go to all appointments, and call your doctor if you are having problems. It's also a good idea to know your test results and keep a list of the medicines you take. Where can you learn more? Go to http://brandy-giorgio.info/. Enter V854 in the search box to learn more about \"Healthy Upper Back: Exercises. \"  Current as of: March 21, 2017  Content Version: 11.4  © 0817-5811 Healthwise, Incorporated. Care instructions adapted under license by [employer] (which disclaims liability or warranty for this information). If you have questions about a medical condition or this instruction, always ask your healthcare professional. [place] [address] any warranty or liability for your use of this information.

## 2017-11-20 RX ORDER — ATORVASTATIN CALCIUM 10 MG/1
TABLET, FILM COATED ORAL
Qty: 90 TAB | Refills: 0 | Status: SHIPPED | OUTPATIENT
Start: 2017-11-20 | End: 2018-03-21 | Stop reason: SDUPTHER

## 2017-11-21 ENCOUNTER — OFFICE VISIT (OUTPATIENT)
Dept: SLEEP MEDICINE | Age: 72
End: 2017-11-21

## 2017-11-21 VITALS
BODY MASS INDEX: 32.78 KG/M2 | HEART RATE: 98 BPM | SYSTOLIC BLOOD PRESSURE: 137 MMHG | TEMPERATURE: 97.5 F | WEIGHT: 185 LBS | OXYGEN SATURATION: 100 % | DIASTOLIC BLOOD PRESSURE: 87 MMHG | HEIGHT: 63 IN

## 2017-11-21 DIAGNOSIS — I27.20 MILD PULMONARY HYPERTENSION (HCC): ICD-10-CM

## 2017-11-21 DIAGNOSIS — E66.9 OBESITY (BMI 30.0-34.9): ICD-10-CM

## 2017-11-21 DIAGNOSIS — G47.33 OSA (OBSTRUCTIVE SLEEP APNEA): Primary | ICD-10-CM

## 2017-11-21 DIAGNOSIS — J44.9 CHRONIC OBSTRUCTIVE PULMONARY DISEASE, UNSPECIFIED COPD TYPE (HCC): ICD-10-CM

## 2017-11-21 DIAGNOSIS — I10 ESSENTIAL HYPERTENSION: ICD-10-CM

## 2017-11-21 DIAGNOSIS — Z99.81 DEPENDENCE ON SUPPLEMENTAL OXYGEN: ICD-10-CM

## 2017-11-21 NOTE — TELEPHONE ENCOUNTER
Attempted to reach patient via phone, unsucessful. LVMM requesting she get labs done as soon as possible and to return my call if she had any questions.

## 2017-11-21 NOTE — PROGRESS NOTES
217 [facility]., [place]. [place], [address]  Tel.  [phone]  Fax. 100 [facility] 60  [place], [address]  Tel.  [phone]  Fax. [phone] [address] [place] [address]  Tel.  [phone]  Fax. [phone]     S>Janay Coronel is a 74 y.o. female seen for a positive airway pressure follow-up. She reports no problems using the device. She is 91% compliant over the past 90 days. The following problems are identified:    Drowsiness no Problems exhaling no   Snoring no Forget to put on no   Mask Comfortable yes Can't fall asleep no   Dry Mouth no Mask falls off no   Air Leaking no Frequent awakenings no         She admits that her sleep has improved. She has a history of Mild Pulmonary Hypertension and use supplemental oxygen 2 LPM with CPAP therapy. Allergies   Allergen Reactions    Cetirizine Hives     Other reaction(s): Hives    Amlodipine Swelling     lower extremity swelling    Coreg [Carvedilol] Other (comments)     sweating    Erythromycin Hives    Verapamil Other (comments)     Patient unable to take verapamil for rate control secondary to peripheral edema/maliase/feeling of being high. She has a current medication list which includes the following prescription(s): atorvastatin, valsartan-hydrochlorothiazide, fluticasone-salmeterol, tizanidine, triamcinolone acetonide, eliquis, digoxin, albuterol-ipratropium, albuterol, furosemide, tiotropium, aspirin, cpap machine, and multivitamin. .      She  has a past medical history of Apical variant hypertrophic cardiomyopathy ([address] Chronic diastolic heart failure (HCC); HTN (hypertension); mammogram; LVH (left ventricular hypertrophy) due to hypertensive disease; Mild pulmonary hypertension (9/13/2012); Obstructive sleep apnea (adult) (pediatric) (07-); and Pneumonia (2013 [facility]).     Kennedy Sleepiness Score: 7   and Modified F.O.S.Q. Score Total / 2: 18   which reflect improved sleep quality over therapy time. O>    Visit Vitals    /87    Pulse 98    Temp 97.5 °F (36.4 °C) (Oral)    Ht 5' 3\" (1.6 m)    Wt 185 lb (83.9 kg)    SpO2 100%    BMI 32.77 kg/m2         General:   Not in acute distress   Eyes:  Anicteric sclerae, no obvious strabismus   Nose:  No obvious nasal septum deviation    Oropharynx:   Class 4 oropharyngeal outlet, thick tongue base, uvula not seen due to low-lying soft palate, narrow tonsilo-pharyngeal pilars   Tonsils:   tonsils are not visualized due to low-lying soft palate   Neck:   midline trachea   Chest/Lungs:  Equal lung expansion, clear on auscultation    CVS:  Normal rate, regular rhythm; no JVD   Skin:  Warm to touch; no obvious rashes   Neuro:  No focal deficits ; no obvious tremor    Psych:  Normal affect,  normal countenance;           A>    ICD-10-CM ICD-9-CM    1. YOLANDA (obstructive sleep apnea) G47.33 327.23 AMB SUPPLY ORDER      SLEEP LAB (PAP TITRATION)   2. Chronic obstructive pulmonary disease, unspecified COPD type ([facility] J44.9 496    3. Dependence on supplemental oxygen Z99.81 V46.2 SLEEP LAB (PAP TITRATION)   4. Mild pulmonary hypertension I27.20 416.8    5. Obesity (BMI 30.0-34. 9) E66.9 278.00    6. Essential hypertension I10 401.9      AHI = 5. On CPAP :  6 cmH2O. Compliant:      yes    Therapeutic Response:  Positive    P>  Orders Placed This Encounter    AMB SUPPLY ORDER     Diagnosis: (G47.33) YOLANDA (obstructive sleep apnea)  (primary encounter diagnosis)     Replacement Supplies for Positive Airway Pressure Therapy Device:   Duration of need: 99 months.  Oral/Nasal Combo Mask 1 every 3 months.  Oral Cushion Combo Mask (Replace) 2 per month.  Nasal Pillows Combo Mask (Replace) 2 per month.  Full Face Mask 1 every 3 months.  Full Face Mask Cushion 1 per month.  Nasal Cushion (Replace) 2 per month.  Nasal Pillows (Replace) 2 per month.     Nasal Interface Mask 1 every 3 months.  Headgear 1 every 6 months.  Chinstrap 1 every 6 months.  Tubing 1 every 3 months.  Filter(s) Disposable 2 per month.  Filter(s) Non-Disposable 1 every 6 months.  Oral Interface 1 every 3 months. [address] for Farnaz Perez (Replace) 1 every 6 months.  Tubing with heating element 1 every 3 months. Perform Mask Fitting per patient preference and comfort - replace as above. Andre Quinteros MD, [facility]; NPI: [id number]    Electronically signed. Date:- 11/21/17    SLEEP LAB (PAP TITRATION)     Perform testing on 2 LPM supplemental oxygen     Standing Status:   Future     Standing Expiration Date:   5/22/2018     Order Specific Question:   Reason for Exam     Answer:   YOLANDA       * We have recommended a dedicated weight loss through appropriate diet and an exercise regiment as significant weight reduction has been shown to reduce severity of obstructive sleep apnea. * Follow-up Disposition:  Return if symptoms worsen or fail to improve. * She was asked to contact our office for any problems regarding PAP therapy. * Counseling was provided regarding the importance of regular PAP use and on proper sleep hygiene and safe driving. * Re-enforced proper and regular cleaning for the device. Thank you for allowing us to participate in your patient's medical care. Yomaira Kumar MD, [facility]  Electronically signed.  11/21/17

## 2017-11-21 NOTE — PATIENT INSTRUCTIONS
217 [facility]., [place]. [place], [address]  Tel.  [phone]  Fax. 100 [facility] 60  [place], [address]  Tel.  [phone]  Fax. [phone] [address] [address] [place]  Tel.  [phone]  Fax. [phone]     Learning About CPAP for Sleep Apnea  What is CPAP? CPAP is a small machine that you use at home every night while you sleep. It increases air pressure in your throat to keep your airway open. When you have sleep apnea, this can help you sleep better so you feel much better. CPAP stands for \"continuous positive airway pressure. \"  The CPAP machine will have one of the following:  · A mask that covers your nose and mouth  · Prongs that fit into your nose  · A mask that covers your nose only, the most common type. This type is called NCPAP. The N stands for \"nasal.\"  Why is it done? CPAP is usually the best treatment for obstructive sleep apnea. It is the first treatment choice and the most widely used. Your doctor may suggest CPAP if you have:  · Moderate to severe sleep apnea. · Sleep apnea and coronary artery disease (CAD) or heart failure. How does it help? · CPAP can help you have more normal sleep, so you feel less sleepy and more alert during the daytime. · CPAP may help keep heart failure or other heart problems from getting worse. · NCPAP may help lower your blood pressure. · If you use CPAP, your bed partner may also sleep better because you are not snoring or restless. What are the side effects? Some people who use CPAP have:  · A dry or stuffy nose and a sore throat. · Irritated skin on the face. · Sore eyes. · Bloating. If you have any of these problems, work with your doctor to fix them. Here are some things you can try:  · Be sure the mask or nasal prongs fit well. · See if your doctor can adjust the pressure of your CPAP. · If your nose is dry, try a humidifier.   · If your nose is runny or stuffy, try decongestant medicine or a steroid nasal spray. If these things do not help, you might try a different type of machine. Some machines have air pressure that adjusts on its own. Others have air pressures that are different when you breathe in than when you breathe out. This may reduce discomfort caused by too much pressure in your nose. Where can you learn more? Go to [employer]  Enter Nir Cunningham in the search box to learn more about \"Learning About CPAP for Sleep Apnea. \"   © 7730-0771 Healthwise, Incorporated. Care instructions adapted under license by [employer] (which disclaims liability or warranty for this information). This care instruction is for use with your licensed healthcare professional. If you have questions about a medical condition or this instruction, always ask your healthcare professional. [address] any warranty or liability for your use of this information. Content Version: 5.2.94407; Last Revised: January 11, 2010  PROPER SLEEP HYGIENE    What to avoid  · Do not have drinks with caffeine, such as coffee or black tea, for 8 hours before bed. · Do not smoke or use other types of tobacco near bedtime. Nicotine is a stimulant and can keep you awake. · Avoid drinking alcohol late in the evening, because it can cause you to wake in the middle of the night. · Do not eat a big meal close to bedtime. If you are hungry, eat a light snack. · Do not drink a lot of water close to bedtime, because the need to urinate may wake you up during the night. · Do not read or watch TV in bed. Use the bed only for sleeping and sexual activity. What to try  · Go to bed at the same time every night, and wake up at the same time every morning. Do not take naps during the day. · Keep your bedroom quiet, dark, and cool. · Get regular exercise, but not within 3 to 4 hours of your bedtime. .  · Sleep on a comfortable pillow and mattress.   · If watching the clock makes you anxious, turn it facing away from you so you cannot see the time. · If you worry when you lie down, start a worry book. Well before bedtime, write down your worries, and then set the book and your concerns aside. · Try meditation or other relaxation techniques before you go to bed. · If you cannot fall asleep, get up and go to another room until you feel sleepy. Do something relaxing. Repeat your bedtime routine before you go to bed again. · Make your house quiet and calm about an hour before bedtime. Turn down the lights, turn off the TV, log off the computer, and turn down the volume on music. This can help you relax after a busy day. Drowsy Driving: The [employer] 54 cites drowsiness as a causing factor in more than 392,048 police reported crashes annually, resulting in 76,000 injuries and 1,500 deaths. Other surveys suggest 55% of people polled have driven while drowsy in the past year, 23% had fallen asleep but not crashed, 3% crashed, and 2% had and accident due to drowsy driving. Who is at risk? Young Drivers: One study of drowsy driving accidents states that 55% of the drivers were under 25 years. Of those, 75% were male. Shift Workers and Travelers: People who work overnight or travel across time zones frequently are at higher risk of experiencing Circadian Rhythm Disorders. They are trying to work and function when their body is programed to sleep. Sleep Deprived: Lack of sleep has a serious impact on your ability to pay attention or focus on a task. Consistently getting less than the average of 8 hours your body needs creates partial or cumulative sleep deprivation. Untreated Sleep Disorders: Sleep Apnea, Narcolepsy, R.L.S., and other sleep disorders (untreated) prevent a person from getting enough restful sleep. This leads to excessive daytime sleepiness and increases the risk for drowsy driving accidents by up to 7 times.   Medications / Alcohol: Even over the counter medications can cause drowsiness. Medications that impair a drivers attention should have a warning label. Alcohol naturally makes you sleepy and on its own can cause accidents. Combined with excessive drowsiness its effects are amplified. Signs of Drowsy Driving:   * You don't remember driving the last few miles   * You may drift out of your cherry   * You are unable to focus and your thoughts wander   * You may yawn more often than normal   * You have difficulty keeping your eyes open / nodding off   * Missing traffic signs, speeding, or tailgating  Prevention-   Good sleep hygiene, lifestyle and behavioral choices have the most impact on drowsy driving. There is no substitute for sleep and the average person requires 8 hours nightly. If you find yourself driving drowsy, stop and sleep. Consider the sleep hygiene tips provided during your visit as well. Medication Refill Policy: Refills for all medications require 1 week advance notice. Please have your pharmacy fax a refill request. We are unable to fax, or call in \"controled substance\" medications and you will need to pick these prescriptions up from our office. [employer] Activation    Thank you for requesting access to [employer]. Please follow the instructions below to securely access and download your online medical record. [employer] allows you to send messages to your doctor, view your test results, renew your prescriptions, schedule appointments, and more. How Do I Sign Up? 1. In your internet browser, go to [email or web address]. 2. Click on the First Time User? Click Here link in the Sign In box. You will see the New Member Sign Up page. 3. Enter your [employer] Access Code exactly as it appears below. You will not need to use this code after youve completed the sign-up process. If you do not sign up before the expiration date, you must request a new code.     [employer] App Access Code: [email or web address]  Expires: 2018  2:19 PM (This is the date your [employer] access code will )    4. Enter the last four digits of your Social Security Number (xxxx) and Date of Birth (mm/dd/yyyy) as indicated and click Submit. You will be taken to the next sign-up page. 5. Create a [employer] ID. This will be your [employer] login ID and cannot be changed, so think of one that is secure and easy to remember. 6. Create a [employer] password. You can change your password at any time. 7. Enter your Password Reset Question and Answer. This can be used at a later time if you forget your password. 8. Enter your e-mail address. You will receive e-mail notification when new information is available in [address] 9. Click Sign Up. You can now view and download portions of your medical record. 10. Click the Download Summary menu link to download a portable copy of your medical information. Additional Information    If you have questions, please call [phone]. Remember, [employer] is NOT to be used for urgent needs. For medical emergencies, dial 911.

## 2017-11-22 ENCOUNTER — DOCUMENTATION ONLY (OUTPATIENT)
Dept: SLEEP MEDICINE | Age: 72
End: 2017-11-22

## 2017-11-23 LAB
ALBUMIN SERPL-MCNC: 4.3 G/DL (ref 3.5–4.8)
ALBUMIN/CREAT UR: 34.3 MG/G CREAT (ref 0–30)
ALBUMIN/GLOB SERPL: 2 {RATIO} (ref 1.2–2.2)
ALP SERPL-CCNC: 84 IU/L (ref 39–117)
ALT SERPL-CCNC: 11 IU/L (ref 0–32)
AST SERPL-CCNC: 13 IU/L (ref 0–40)
BILIRUB SERPL-MCNC: 0.6 MG/DL (ref 0–1.2)
BUN SERPL-MCNC: 17 MG/DL (ref 8–27)
BUN/CREAT SERPL: 22 (ref 12–28)
CALCIUM SERPL-MCNC: 9.3 MG/DL (ref 8.7–10.3)
CHLORIDE SERPL-SCNC: 101 MMOL/L (ref 96–106)
CHOLEST SERPL-MCNC: 101 MG/DL (ref 100–199)
CO2 SERPL-SCNC: 32 MMOL/L (ref 18–29)
CREAT SERPL-MCNC: 0.78 MG/DL (ref 0.57–1)
CREAT UR-MCNC: 249.6 MG/DL
ERYTHROCYTE [DISTWIDTH] IN BLOOD BY AUTOMATED COUNT: 14.1 % (ref 12.3–15.4)
EST. AVERAGE GLUCOSE BLD GHB EST-MCNC: 137 MG/DL
GFR SERPLBLD CREATININE-BSD FMLA CKD-EPI: 75 ML/MIN/1.73
GFR SERPLBLD CREATININE-BSD FMLA CKD-EPI: 87 ML/MIN/1.73
GLOBULIN SER CALC-MCNC: 2.2 G/DL (ref 1.5–4.5)
GLUCOSE SERPL-MCNC: 113 MG/DL (ref 65–99)
HBA1C MFR BLD: 6.4 % (ref 4.8–5.6)
HCT VFR BLD AUTO: 37.9 % (ref 34–46.6)
HDLC SERPL-MCNC: 35 MG/DL
HGB BLD-MCNC: 12.4 G/DL (ref 11.1–15.9)
LDLC SERPL CALC-MCNC: 49 MG/DL (ref 0–99)
MCH RBC QN AUTO: 28.8 PG (ref 26.6–33)
MCHC RBC AUTO-ENTMCNC: 32.7 G/DL (ref 31.5–35.7)
MCV RBC AUTO: 88 FL (ref 79–97)
MICROALBUMIN UR-MCNC: 85.7 UG/ML
PLATELET # BLD AUTO: 233 X10E3/UL (ref 150–379)
POTASSIUM SERPL-SCNC: 3.9 MMOL/L (ref 3.5–5.2)
PROT SERPL-MCNC: 6.5 G/DL (ref 6–8.5)
RBC # BLD AUTO: 4.3 X10E6/UL (ref 3.77–5.28)
SODIUM SERPL-SCNC: 146 MMOL/L (ref 134–144)
TRIGL SERPL-MCNC: 83 MG/DL (ref 0–149)
VLDLC SERPL CALC-MCNC: 17 MG/DL (ref 5–40)
WBC # BLD AUTO: 6.9 X10E3/UL (ref 3.4–10.8)

## 2017-12-05 ENCOUNTER — TELEPHONE (OUTPATIENT)
Dept: SLEEP MEDICINE | Age: 72
End: 2017-12-05

## 2017-12-05 DIAGNOSIS — J44.9 CHRONIC OBSTRUCTIVE PULMONARY DISEASE, UNSPECIFIED COPD TYPE (HCC): ICD-10-CM

## 2017-12-05 DIAGNOSIS — G47.33 OSA (OBSTRUCTIVE SLEEP APNEA): Primary | ICD-10-CM

## 2017-12-05 NOTE — TELEPHONE ENCOUNTER
Encounter Diagnoses   Name Primary?  YOLANDA (obstructive sleep apnea) Yes    Chronic obstructive pulmonary disease, unspecified COPD type ([facility]      Orders Placed This Encounter    AMB SUPPLY ORDER     Diagnosis: Sleep Apnea ICD-10 Code (G47.30); ICD-9 Code (780.57). Nocturnal Oximetry on PAP with supplemental oxygen 2 LPM.     Robert Roldan MD, FAASM; NPI: [id number]  Electronically signed. 12/05/17     * Hold off titration and perform Oximetry on PAP and Oxygen.

## 2017-12-05 NOTE — TELEPHONE ENCOUNTER
Sent order to [address] [place] [address]. Patient has already been notified of cancellation and nocturnal oximetry testing.

## 2017-12-05 NOTE — TELEPHONE ENCOUNTER
[facility] will be contacting to do a peer to peer. They will be contacting the [facility] [place] office within 24 hours.        Front staff: look out for a call from [facility]

## 2017-12-11 ENCOUNTER — TELEPHONE (OUTPATIENT)
Dept: SLEEP MEDICINE | Age: 72
End: 2017-12-11

## 2017-12-11 NOTE — TELEPHONE ENCOUNTER
Received a fax from [email or web address] stating that the patients Oximetry testing has been cancelled. Called the patient to ask if she has heard from [employer] [place] regarding this and she stated that she has and that [employer] [place] will be performing the Oximetry testing on 12/11/17. Fax has been scanned in media.

## 2017-12-21 RX ORDER — APIXABAN 5 MG/1
TABLET, FILM COATED ORAL
Qty: 60 TAB | Refills: 5 | Status: SHIPPED | OUTPATIENT
Start: 2017-12-21 | End: 2018-06-25 | Stop reason: SDUPTHER

## 2017-12-21 NOTE — TELEPHONE ENCOUNTER
Request for eliquis 5 mg tablet, take 1 tab by mouth 2 times a day. Last office visit 10/24/17,   Next office visit 4/24/18.      Refills per verbal order from Dr. Negro Pillai.

## 2017-12-27 ENCOUNTER — CLINICAL SUPPORT (OUTPATIENT)
Dept: INTERNAL MEDICINE CLINIC | Age: 72
End: 2017-12-27

## 2017-12-27 VITALS — SYSTOLIC BLOOD PRESSURE: 132 MMHG | DIASTOLIC BLOOD PRESSURE: 76 MMHG

## 2017-12-27 DIAGNOSIS — I10 ESSENTIAL HYPERTENSION: Primary | ICD-10-CM

## 2017-12-27 NOTE — PROGRESS NOTES
For BP check. Per Dr. Marisa Mcfadden, keep next appointment f/u which should be in May. Patient will scheduled on her way out.

## 2018-03-01 RX ORDER — DIGOXIN 125 MCG
TABLET ORAL
Qty: 30 TAB | Refills: 5 | Status: SHIPPED | OUTPATIENT
Start: 2018-03-01 | End: 2018-09-03 | Stop reason: SDUPTHER

## 2018-03-01 NOTE — TELEPHONE ENCOUNTER
Request for digoxin 0.125 mg, tab, daily. Last office visit 10/24/17,   Next office visit 4/24/18.      Refills per verbal order from Dr. Elaina Whitley.

## 2018-03-21 RX ORDER — ATORVASTATIN CALCIUM 10 MG/1
TABLET, FILM COATED ORAL
Qty: 90 TAB | Refills: 1 | Status: SHIPPED | OUTPATIENT
Start: 2018-03-21 | End: 2018-10-23 | Stop reason: SDUPTHER

## 2018-04-13 ENCOUNTER — OFFICE VISIT (OUTPATIENT)
Dept: INTERNAL MEDICINE CLINIC | Age: 73
End: 2018-04-13

## 2018-04-13 VITALS
RESPIRATION RATE: 16 BRPM | DIASTOLIC BLOOD PRESSURE: 82 MMHG | WEIGHT: 190 LBS | HEIGHT: 63 IN | OXYGEN SATURATION: 97 % | BODY MASS INDEX: 33.66 KG/M2 | SYSTOLIC BLOOD PRESSURE: 136 MMHG | TEMPERATURE: 97.9 F | HEART RATE: 82 BPM

## 2018-04-13 DIAGNOSIS — J40 BRONCHITIS: Primary | ICD-10-CM

## 2018-04-13 RX ORDER — BENZONATATE 200 MG/1
200 CAPSULE ORAL
Qty: 20 CAP | Refills: 0 | Status: SHIPPED | OUTPATIENT
Start: 2018-04-13 | End: 2018-04-20

## 2018-04-13 RX ORDER — MINERAL OIL
180 ENEMA (ML) RECTAL
COMMUNITY

## 2018-04-13 NOTE — PROGRESS NOTES
Mic Ospina is a 74 y.o. female who was seen in clinic today (4/13/2018). Assessment & Plan:   Diagnoses and all orders for this visit:    1. Bronchitis- discussed diagnosis & treatment options, discussed viral vs bacterial etiologies and at this time favor a viral etiology, reviewed the importance of avoiding unnecessary antibiotic therapy, will start on meds below, reviewed which OTC medications to use and avoid, expected time course for resolution & red flags were reviewed with her to RTC or notify me. She will update me in 3-5 days and if no improvement will start her on an antibiotic at that time. -     benzonatate (TESSALON) 200 mg capsule; Take 1 Cap by mouth three (3) times daily as needed for Cough for up to 7 days. Follow-up Disposition:  Return if symptoms worsen or fail to improve. Subjective:   Reema Auguste was seen today for Cold Symptoms    URI Review  Reema Auguste returns to clinic today to talk about: URI symptoms for 6 days ago, which are gradually improving since that time. She reports productive cough, fatigue and chest congestion. She had some post nasal drip and sore throat and rhinorrhea but this has resolved. She denies a history of: fever and chills. Treatments have included: only using Rx medications which have been not very effective. Relevant PMH: Asthma. Patient reports sick contacts: no.         Prior to Admission medications    Medication Sig Start Date End Date Taking? Authorizing Provider   GUAIFENESIN/DEXTROMETHORPHAN (ROBITUSSIN-DM PO) Take  by mouth. Yes Historical Provider   FLUTICASONE PROPIONATE (FLONASE NA) by Nasal route as needed. Yes Historical Provider   fexofenadine (ALLEGRA) 180 mg tablet Take 180 mg by mouth daily as needed for Allergies.    Yes Historical Provider   atorvastatin (LIPITOR) 10 mg tablet TAKE 1 TABLET BY MOUTH EVERY DAY 3/21/18  Yes Donnie Brown MD   digoxin (LANOXIN) 0.125 mg tablet TAKE 1 TABLET BY MOUTH EVERY DAY 3/1/18  Yes Kimmy Foy MD   ELIQUIS 5 mg tablet TAKE 1 TAB BY MOUTH TWO (2) TIMES A DAY. 12/21/17  Yes Kimmy Foy MD   valsartan-hydroCHLOROthiazide (DIOVAN-HCT) 320-25 mg per tablet Take 1 Tab by mouth daily. 11/17/17  Yes Collette Beverage, MD   fluticasone-salmeterol (ADVAIR DISKUS) 250-50 mcg/dose diskus inhaler INHALE 1 PUFF BY MOUTH TWICE A DAY. 10/13/17  Yes Collette Beverage, MD   triamcinolone acetonide (KENALOG) 0.1 % topical cream APPLY A THIN LAYER TO AFFECTED AREA(S) AS DIRECTED TWICE A DAY AS NEEDED 5/17/17  Yes Collette Beverage, MD   albuterol-ipratropium (DUO-NEB) 2.5 mg-0.5 mg/3 ml nebu 3 mL by Nebulization route four (4) times daily. Patient taking differently: 3 mL by Nebulization route as needed. 5/10/16  Yes Collette Beverage, MD   albuterol (PROVENTIL HFA, VENTOLIN HFA, PROAIR HFA) 90 mcg/actuation inhaler Take 1 Puff by inhalation every six (6) hours as needed for Wheezing. 4/19/16  Yes Collette Beverage, MD   furosemide (LASIX) 20 mg tablet 1 tablet daily  Patient taking differently: 20 mg as needed. 1 tablet daily 7/14/14  Yes Collette Beverage, MD   tiotropium [facility]) 18 mcg inhalation capsule Take 1 Cap by inhalation daily. Patient taking differently: Take 1 Cap by inhalation as needed. 7/14/14  Yes Collette Beverage, MD   aspirin 81 mg tablet Take 1 Tab by mouth daily. 1/9/14  Yes Collette Beverage, MD   cpap machine kit by Does Not Apply route nightly. Yes Historical Provider   multivitamin (ONE A DAY) tablet Take 1 Tab by mouth daily. Yes Historical Provider          Allergies   Allergen Reactions    Cetirizine Hives     Other reaction(s): Hives    Amlodipine Swelling     lower extremity swelling    Coreg [Carvedilol] Other (comments)     sweating    Erythromycin Hives    Verapamil Other (comments)     Patient unable to take verapamil for rate control secondary to peripheral edema/maliase/feeling of being high.            ROS - per HPI Objective:   Physical Exam   Constitutional: No distress. HENT:   Right Ear: Tympanic membrane is not erythematous and not bulging. No middle ear effusion. Left Ear: Tympanic membrane is not erythematous and not bulging. No middle ear effusion. Nose: Rhinorrhea present. No mucosal edema. Right sinus exhibits no maxillary sinus tenderness and no frontal sinus tenderness. Left sinus exhibits no maxillary sinus tenderness and no frontal sinus tenderness. Mouth/Throat: Uvula is midline and mucous membranes are normal. Posterior oropharyngeal erythema present. No oropharyngeal exudate. Eyes: Conjunctivae are normal. No scleral icterus. Neck: Neck supple. Cardiovascular: Normal heart sounds. An irregularly irregular rhythm present. No murmur heard. Pulmonary/Chest: Effort normal and breath sounds normal. She has no wheezes. She has no rales. Lymphadenopathy:     She has no cervical adenopathy. Visit Vitals    /82    Pulse 82    Temp 97.9 °F (36.6 °C) (Oral)    Resp 16    Ht 5' 3\" (1.6 m)    Wt 190 lb (86.2 kg)    SpO2 97%    BMI 33.66 kg/m2         Disclaimer:  Advised her to call back or return to office if symptoms worsen/change/persist.  Discussed expected course/resolution/complications of diagnosis in detail with patient. Medication risks/benefits/costs/interactions/alternatives discussed with patient. She was given an after visit summary which includes diagnoses, current medications, & vitals. She expressed understanding with the diagnosis and plan. Aspects of this note may have been generated using voice recognition software. Despite editing, there may be some syntax errors.        Pastora Zurita MD

## 2018-04-13 NOTE — MR AVS SNAPSHOT
[address] [facility] [address] [place] 
[phone] Patient: José Antonio Sofia MRN: [id number] NFY:6[birth year] Visit Information Date & Time Provider Department Dept. Phone Encounter #  
 4/13/2018 12:45 PM Carlo English, [address] [facility] Internal Medicine [phone] [id number] Follow-up Instructions Return if symptoms worsen or fail to improve. Follow-up and Disposition History Your Appointments 4/24/2018 11:20 AM  
ESTABLISHED PATIENT with Rina Shore MD  
[facility] (KYLE SCHEDULING) Appt Note: 6 mo [address] [place]  
[address] [address]  
  
    
 5/23/2018  1:30 PM  
Medicare Physical with Carlo English MD  
[facility] Internal Medicine [address]) Appt Note: wellness [address] [facility] [address] [facility] [address]  
[address] [facility] Upcoming Health Maintenance Date Due DTaP/Tdap/Td series (1 - Tdap) 6/8/1964 ZOSTER VACCINE AGE 60> 4/8/2003 Pneumococcal 65+ Low/Medium Risk (2 of 2 - PPSV23) 1/27/2017 EYE EXAM RETINAL OR DILATED Q1 6/23/2017 BREAST CANCER SCRN MAMMOGRAM 11/19/2017 MEDICARE YEARLY EXAM 5/18/2018 HEMOGLOBIN A1C Q6M 5/22/2018 GLAUCOMA SCREENING Q2Y 6/23/2018 FOOT EXAM Q1 11/17/2018 MICROALBUMIN Q1 11/22/2018 LIPID PANEL Q1 11/22/2018 Allergies as of 4/13/2018  Review Complete On: 4/13/2018 By: Carlo English MD  
  
 Severity Noted Reaction Type Reactions Cetirizine Medium 10/18/2016    Hives Other reaction(s): Hives Amlodipine  01/03/2014   Side Effect Swelling  
 lower extremity swelling Coreg [Carvedilol]  04/29/2014   Side Effect Other (comments)  
 sweating Erythromycin  09/29/2010    Hives Verapamil Low 01/31/2012   Not Verified Other (comments) Patient unable to take verapamil for rate control secondary to peripheral edema/maliase/feeling of being high. Current Immunizations  Reviewed on 4/13/2018 Name Date Influenza High Dose Vaccine PF 8/31/2017 12:00 AM, 11/6/2015 Influenza Vaccine Split 1/28/2012 12:03 AM, 3/31/2011  2:45 AM  
 ZZZ-RETIRED (DO NOT USE) Pneumococcal Vaccine (Unspecified Type) 1/27/2012 11:56 PM  
  
 Reviewed by Zachary Morales RN on 4/13/2018 at 12:26 PM  
You Were Diagnosed With   
  
 Codes Comments Bronchitis    -  Primary ICD-10-CM: P08 ICD-9-CM: 772 Vitals BP Pulse Temp Resp Height(growth percentile) Weight(growth percentile) 136/82 82 97.9 °F (36.6 °C) (Oral) 16 5' 3\" (1.6 m) 190 lb (86.2 kg) SpO2 BMI OB Status Smoking Status 97% 33.66 kg/m2 Hysterectomy Former Smoker Vitals History BMI and BSA Data Body Mass Index Body Surface Area  
 33.66 kg/m 2 1.96 m 2 Preferred Pharmacy Pharmacy Name Phone [id number] [phone] Your Updated Medication List  
  
   
This list is accurate as of 4/13/18 12:47 PM.  Always use your most recent med list.  
  
  
  
  
 albuterol 90 mcg/actuation inhaler Commonly known as:  PROVENTIL HFA, VENTOLIN HFA, PROAIR HFA Take 1 Puff by inhalation every six (6) hours as needed for Wheezing. albuterol-ipratropium 2.5 mg-0.5 mg/3 ml Nebu Commonly known as:  DUO-NEB  
3 mL by Nebulization route four (4) times daily. aspirin 81 mg tablet Take 1 Tab by mouth daily. atorvastatin 10 mg tablet Commonly known as:  LIPITOR  
TAKE 1 TABLET BY MOUTH EVERY DAY  
  
 benzonatate 200 mg capsule Commonly known as:  TESSALON Take 1 Cap by mouth three (3) times daily as needed for Cough for up to 7 days. cpap machine kit  
by Does Not Apply route nightly. digoxin 0.125 mg tablet Commonly known as:  LANOXIN  
TAKE 1 TABLET BY MOUTH EVERY DAY  
  
 ELIQUIS 5 mg tablet Generic drug:  apixaban TAKE 1 TAB BY MOUTH TWO (2) TIMES A DAY. fexofenadine 180 mg tablet Commonly known as:  Renay Munda Take 180 mg by mouth daily as needed for Allergies. FLONASE NA  
by Nasal route as needed. fluticasone-salmeterol 250-50 mcg/dose diskus inhaler Commonly known as:  ADVAIR DISKUS INHALE 1 PUFF BY MOUTH TWICE A DAY. furosemide 20 mg tablet Commonly known as:  LASIX  
1 tablet daily  
  
 multivitamin tablet Commonly known as:  ONE A DAY Take 1 Tab by mouth daily. ROBITUSSIN-DM PO Take  by mouth. tiotropium 18 mcg inhalation capsule Commonly known as:  Ruthanna Hamburger Take 1 Cap by inhalation daily. triamcinolone acetonide 0.1 % topical cream  
Commonly known as:  KENALOG  
APPLY A THIN LAYER TO AFFECTED AREA(S) AS DIRECTED TWICE A DAY AS NEEDED  
  
 valsartan-hydroCHLOROthiazide 320-25 mg per tablet Commonly known as:  DIOVAN-HCT Take 1 Tab by mouth daily. Prescriptions Sent to Pharmacy Refills  
 benzonatate (TESSALON) 200 mg capsule 0 Sig: Take 1 Cap by mouth three (3) times daily as needed for Cough for up to 7 days. Class: Normal  
 Pharmacy: [address] [place] [address] #: [phone] Route: Oral  
  
Follow-up Instructions Return if symptoms worsen or fail to improve. Patient Instructions Bronchitis: Care Instructions Your Care Instructions Bronchitis is inflammation of the bronchial tubes, which carry air to the lungs. The tubes swell and produce mucus, or phlegm. The mucus and inflamed bronchial tubes make you cough. You may have trouble breathing. Most cases of bronchitis are caused by viruses like those that cause colds. Antibiotics usually do not help and they may be harmful.  
Bronchitis usually develops rapidly and lasts about 2 to 3 weeks in otherwise healthy people. Follow-up care is a key part of your treatment and safety. Be sure to make and go to all appointments, and call your doctor if you are having problems. It's also a good idea to know your test results and keep a list of the medicines you take. How can you care for yourself at home? · Take all medicines exactly as prescribed. Call your doctor if you think you are having a problem with your medicine. · Get some extra rest. 
· Take an over-the-counter pain medicine, such as acetaminophen (Tylenol), ibuprofen (Advil, Motrin), or naproxen (Aleve) to reduce fever and relieve body aches. Read and follow all instructions on the label. · Do not take two or more pain medicines at the same time unless the doctor told you to. Many pain medicines have acetaminophen, which is Tylenol. Too much acetaminophen (Tylenol) can be harmful. · Take an over-the-counter cough medicine that contains dextromethorphan to help quiet a dry, hacking cough so that you can sleep. Avoid cough medicines that have more than one active ingredient. Read and follow all instructions on the label. · Breathe moist air from a humidifier, hot shower, or sink filled with hot water. The heat and moisture will thin mucus so you can cough it out. · Do not smoke. Smoking can make bronchitis worse. If you need help quitting, talk to your doctor about stop-smoking programs and medicines. These can increase your chances of quitting for good. When should you call for help? Call 911 anytime you think you may need emergency care. For example, call if: 
? · You have severe trouble breathing. ?Call your doctor now or seek immediate medical care if: 
? · You have new or worse trouble breathing. ? · You cough up dark brown or bloody mucus (sputum). ? · You have a new or higher fever. ? · You have a new rash. ? Watch closely for changes in your health, and be sure to contact your doctor if: ? · You cough more deeply or more often, especially if you notice more mucus or a change in the color of your mucus. ? · You are not getting better as expected. Where can you learn more? Go to http://brandy-giorgio.info/. Enter H333 in the search box to learn more about \"Bronchitis: Care Instructions. \" Current as of: May 12, 2017 Content Version: 11.4 © 3630-1609 Sinbad: online travellers club. Care instructions adapted under license by Rayn (which disclaims liability or warranty for this information). If you have questions about a medical condition or this instruction, always ask your healthcare professional. [address] any warranty or liability for your use of this information. Introducing \[facility] [place] [facility]\"" & HEALTH SERVICES! Adriana Mcneal introduces [employer] patient portal. Now you can access parts of your medical record, email your doctor's office, and request medication refills online. 1. In your internet browser, go to [email or web address] 2. Click on the First Time User? Click Here link in the Sign In box. You will see the New Member Sign Up page. 3. Enter your [employer] Access Code exactly as it appears below. You will not need to use this code after youve completed the sign-up process. If you do not sign up before the expiration date, you must request a new code. · [employer] Access Code: [email or web address] Expires: 7/12/2018 12:47 PM 
 
4. Enter the last four digits of your Social Security Number (xxxx) and Date of Birth (mm/dd/yyyy) as indicated and click Submit. You will be taken to the next sign-up page. 5. Create a Paraytect ID. This will be your [employer] login ID and cannot be changed, so think of one that is secure and easy to remember. 6. Create a [employer] password. You can change your password at any time. 7. Enter your Password Reset Question and Answer. This can be used at a later time if you forget your password. 8. Enter your e-mail address. You will receive e-mail notification when new information is available in [address] 9. Click Sign Up. You can now view and download portions of your medical record. 10. Click the Download Summary menu link to download a portable copy of your medical information. If you have questions, please visit the Frequently Asked Questions section of the GoLark website. Remember, [employer] is NOT to be used for urgent needs. For medical emergencies, dial 911. Now available from your iPhone and Android! Please provide this summary of care documentation to your next provider. Your primary care clinician is listed as Carlo English. If you have any questions after today's visit, please call [phone].

## 2018-04-13 NOTE — PATIENT INSTRUCTIONS
Bronchitis: Care Instructions  Your Care Instructions    Bronchitis is inflammation of the bronchial tubes, which carry air to the lungs. The tubes swell and produce mucus, or phlegm. The mucus and inflamed bronchial tubes make you cough. You may have trouble breathing. Most cases of bronchitis are caused by viruses like those that cause colds. Antibiotics usually do not help and they may be harmful. Bronchitis usually develops rapidly and lasts about 2 to 3 weeks in otherwise healthy people. Follow-up care is a key part of your treatment and safety. Be sure to make and go to all appointments, and call your doctor if you are having problems. It's also a good idea to know your test results and keep a list of the medicines you take. How can you care for yourself at home? · Take all medicines exactly as prescribed. Call your doctor if you think you are having a problem with your medicine. · Get some extra rest.  · Take an over-the-counter pain medicine, such as acetaminophen (Tylenol), ibuprofen (Advil, Motrin), or naproxen (Aleve) to reduce fever and relieve body aches. Read and follow all instructions on the label. · Do not take two or more pain medicines at the same time unless the doctor told you to. Many pain medicines have acetaminophen, which is Tylenol. Too much acetaminophen (Tylenol) can be harmful. · Take an over-the-counter cough medicine that contains dextromethorphan to help quiet a dry, hacking cough so that you can sleep. Avoid cough medicines that have more than one active ingredient. Read and follow all instructions on the label. · Breathe moist air from a humidifier, hot shower, or sink filled with hot water. The heat and moisture will thin mucus so you can cough it out. · Do not smoke. Smoking can make bronchitis worse. If you need help quitting, talk to your doctor about stop-smoking programs and medicines. These can increase your chances of quitting for good.   When should you call for help? Call 911 anytime you think you may need emergency care. For example, call if:  ? · You have severe trouble breathing. ?Call your doctor now or seek immediate medical care if:  ? · You have new or worse trouble breathing. ? · You cough up dark brown or bloody mucus (sputum). ? · You have a new or higher fever. ? · You have a new rash. ? Watch closely for changes in your health, and be sure to contact your doctor if:  ? · You cough more deeply or more often, especially if you notice more mucus or a change in the color of your mucus. ? · You are not getting better as expected. Where can you learn more? Go to http://brandy-giorgio.info/. Enter H333 in the search box to learn more about \"Bronchitis: Care Instructions. \"  Current as of: May 12, 2017  Content Version: 11.4  © 0651-1832 Industri[employer]lex. Care instructions adapted under license by Check (which disclaims liability or warranty for this information). If you have questions about a medical condition or this instruction, always ask your healthcare professional. [address] any warranty or liability for your use of this information.

## 2018-04-18 ENCOUNTER — TELEPHONE (OUTPATIENT)
Dept: INTERNAL MEDICINE CLINIC | Age: 73
End: 2018-04-18

## 2018-04-18 RX ORDER — DOXYCYCLINE 100 MG/1
100 CAPSULE ORAL 2 TIMES DAILY
Qty: 14 CAP | Refills: 0 | Status: SHIPPED | OUTPATIENT
Start: 2018-04-18 | End: 2018-04-25

## 2018-04-18 NOTE — TELEPHONE ENCOUNTER
Patient called to let Dr. Tosha Holt know she is still having sinus drainage and a lot of thick, yellow mucous coming up from her chest, especially when she lies down. Asked if he could give her something else to go along with what he prescribed at her last appt. Says she needs a little extra help to get over this.

## 2018-05-17 ENCOUNTER — OFFICE VISIT (OUTPATIENT)
Dept: CARDIOLOGY CLINIC | Age: 73
End: 2018-05-17

## 2018-05-17 VITALS
HEIGHT: 63 IN | OXYGEN SATURATION: 97 % | SYSTOLIC BLOOD PRESSURE: 110 MMHG | HEART RATE: 90 BPM | BODY MASS INDEX: 33.73 KG/M2 | DIASTOLIC BLOOD PRESSURE: 80 MMHG | WEIGHT: 190.4 LBS

## 2018-05-17 DIAGNOSIS — I10 ESSENTIAL HYPERTENSION: ICD-10-CM

## 2018-05-17 DIAGNOSIS — I42.2 APICAL VARIANT HYPERTROPHIC CARDIOMYOPATHY (HCC): Primary | ICD-10-CM

## 2018-05-17 DIAGNOSIS — I50.32 CHRONIC DIASTOLIC HEART FAILURE (HCC): ICD-10-CM

## 2018-05-17 DIAGNOSIS — I05.9 MITRAL VALVE DISORDER: ICD-10-CM

## 2018-05-17 DIAGNOSIS — I48.20 CHRONIC ATRIAL FIBRILLATION (HCC): ICD-10-CM

## 2018-05-17 PROBLEM — E11.21 TYPE 2 DIABETES WITH NEPHROPATHY (HCC): Status: ACTIVE | Noted: 2018-05-17

## 2018-05-17 NOTE — MR AVS SNAPSHOT
[address] [facility] 
[phone] Patient: Rambo Bueno MRN: [id number] EHS:[id number] Visit Information Date & Time Provider Department Dept. Phone Encounter #  
 5/17/2018  9:20 AM Joshua Shepherd MD [facility] [phone] [id number] Your Appointments 5/23/2018  1:30 PM  
Medicare Physical with Triston West MD  
[address] [address] Internal Medicine [address]) Appt Note: wellness [address] [facility] [address] [place] [address] [facility] [address]  
[address] [place] Upcoming Health Maintenance Date Due DTaP/Tdap/Td series (1 - Tdap) 6/8/1964 ZOSTER VACCINE AGE 60> 4/8/2003 Pneumococcal 65+ Low/Medium Risk (2 of 2 - PPSV23) 1/27/2017 EYE EXAM RETINAL OR DILATED Q1 6/23/2017 BREAST CANCER SCRN MAMMOGRAM 11/19/2017 HEMOGLOBIN A1C Q6M 5/22/2018 GLAUCOMA SCREENING Q2Y 6/23/2018 MEDICARE YEARLY EXAM 5/18/2018 Influenza Age 5 to Adult 8/1/2018 FOOT EXAM Q1 11/17/2018 MICROALBUMIN Q1 11/22/2018 LIPID PANEL Q1 11/22/2018 Allergies as of 5/17/2018  Review Complete On: 5/17/2018 By: Joshua Shepherd MD  
  
 Severity Noted Reaction Type Reactions Cetirizine Medium 10/18/2016    Hives Other reaction(s): Hives Amlodipine  01/03/2014   Side Effect Swelling  
 lower extremity swelling Coreg [Carvedilol]  04/29/2014   Side Effect Other (comments)  
 sweating Erythromycin  09/29/2010    Hives Verapamil Low 01/31/2012   Not Verified Other (comments) Patient unable to take verapamil for rate control secondary to peripheral edema/maliase/feeling of being high. Current Immunizations  Reviewed on 4/13/2018 Name Date Influenza High Dose Vaccine PF 8/31/2017 12:00 AM, 11/6/2015  Influenza Vaccine Split 1/28/2012 12:03 AM, 3/31/2011  2:45 AM  
 ZZZ-RETIRED (DO NOT USE) Pneumococcal Vaccine (Unspecified Type) 1/27/2012 11:56 PM  
  
 Not reviewed this visit Vitals BP Pulse Height(growth percentile) Weight(growth percentile) SpO2 BMI  
 110/80 (BP 1 Location: Left arm, BP Patient Position: Sitting) 90 5' 3\" (1.6 m) 190 lb 6.4 oz (86.4 kg) 97% 33.73 kg/m2 OB Status Smoking Status Hysterectomy Former Smoker BMI and BSA Data Body Mass Index Body Surface Area  
 33.73 kg/m 2 1.96 m 2 Preferred Pharmacy [facility]acy [facility] Phone [id number] [phone] Your Updated Medication List  
  
   
This list is accurate as of 5/17/18 10:03 AM.  Always use your most recent med list.  
  
  
  
  
 albuterol 90 mcg/actuation inhaler Commonly known as:  PROVENTIL HFA, VENTOLIN HFA, PROAIR HFA Take 1 Puff by inhalation every six (6) hours as needed for Wheezing. albuterol-ipratropium 2.5 mg-0.5 mg/3 ml Nebu Commonly known as:  DUO-NEB  
3 mL by Nebulization route four (4) times daily. aspirin 81 mg tablet Take 1 Tab by mouth daily. atorvastatin 10 mg tablet Commonly known as:  LIPITOR  
TAKE 1 TABLET BY MOUTH EVERY DAY  
  
 cpap machine kit  
by Does Not Apply route nightly. digoxin 0.125 mg tablet Commonly known as:  LANOXIN  
TAKE 1 TABLET BY MOUTH EVERY DAY  
  
 ELIQUIS 5 mg tablet Generic drug:  apixaban TAKE 1 TAB BY MOUTH TWO (2) TIMES A DAY. fexofenadine 180 mg tablet Commonly known as:  Booker Buttner Take 180 mg by mouth daily as needed for Allergies. FLONASE NA  
by Nasal route as needed. fluticasone-salmeterol 250-50 mcg/dose diskus inhaler Commonly known as:  ADVAIR DISKUS INHALE 1 PUFF BY MOUTH TWICE A DAY. furosemide 20 mg tablet Commonly known as:  LASIX  
1 tablet daily  
  
 multivitamin tablet Commonly known as:  ONE A DAY Take 1 Tab by mouth daily. tiotropium 18 mcg inhalation capsule Commonly known as:  Danielito Seen Take 1 Cap by inhalation daily. triamcinolone acetonide 0.1 % topical cream  
Commonly known as:  KENALOG  
APPLY A THIN LAYER TO AFFECTED AREA(S) AS DIRECTED TWICE A DAY AS NEEDED  
  
 valsartan-hydroCHLOROthiazide 320-25 mg per tablet Commonly known as:  DIOVAN-HCT Take 1 Tab by mouth daily. Patient Instructions You will need to follow up in clinic with Dr. Katia Aguilar in 6 months. Introducing [facility] [place] & HEALTH SERVICES! [facility] introduces [employer] patient portal. Now you can access parts of your medical record, email your doctor's office, and request medication refills online. 1. In your internet browser, go to [email or web address] 2. Click on the First Time User? Click Here link in the Sign In box. You will see the New Member Sign Up page. 3. Enter your [employer] Access Code exactly as it appears below. You will not need to use this code after youve completed the sign-up process. If you do not sign up before the expiration date, you must request a new code. · [employer] Access Code: [email or web address] Expires: 7/12/2018 12:47 PM 
 
4. Enter the last four digits of your Social Security Number (xxxx) and Date of Birth (mm/dd/yyyy) as indicated and click Submit. You will be taken to the next sign-up page. 5. Create a Retidoc ID. This will be your Ret[employer] login ID and cannot be changed, so think of one that is secure and easy to remember. 6. Create a Ret[employer] password. You can change your password at any time. 7. Enter your Password Reset Question and Answer. This can be used at a later time if you forget your password. 8. Enter your e-mail address. You will receive e-mail notification when new information is available in [address] 9. Click Sign Up. You can now view and download portions of your medical record.  
10. Click the Download Summary menu link to download a portable copy of your medical information. If you have questions, please visit the Frequently Asked Questions section of the MatchMine website. Remember, MatchMine is NOT to be used for urgent needs. For medical emergencies, dial 911. Now available from your iPhone and Android! Please provide this summary of care documentation to your next provider. Your primary care clinician is listed as Lane Wang. If you have any questions after today's visit, please call [phone].

## 2018-05-17 NOTE — PROGRESS NOTES
Ann Gaston     1945       Dejon Richey MD, [facility]  Date of Visit-5/17/2018   PCP is Antonio Samuel MD   [facility] [place]  [facility]  HPI:  Ann Gaston is a 74 y.o. 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 Overall the pt states she is doing well. She had bronchitis but this has improved. The pt states that she is feeling some occasional palpitations. She states that her valsartan- HCTZ was increased because her BP was elevated and now this is under better control. The pt denies any bleeding issues on Eliquis. Denies chest pain, edema, syncope or shortness of breath at rest, has no tachycardia or sense of arrhythmia. Assessment/Plan:  Seems unchanged form last visit clinically   1. Apical hypertrophy cardiomyopathy  -as seen on previous MRI and echo   -started Coreg but got fatigue  -EF was 86% on MRI previously  -there was no BUSTER and no scar      2. Chronic AF  -amiodarone did not work   -Coreg with low dose digoxin, is doing well  -continue oral anticoagulant to prevent stroke with the Eliquis     3. Mild diastolic dysfunction   -compensated on Lasix as needed     4. HTN  -at goal with increase in diovan-HCT     Key CAD CHF Meds             atorvastatin (LIPITOR) 10 mg tablet  (Taking) TAKE 1 TABLET BY MOUTH EVERY DAY    digoxin (LANOXIN) 0.125 mg tablet  (Taking) TAKE 1 TABLET BY MOUTH EVERY DAY    ELIQUIS 5 mg tablet  (Taking) TAKE 1 TAB BY MOUTH TWO (2) TIMES A DAY. valsartan-hydroCHLOROthiazide (DIOVAN-HCT) 320-25 mg per tablet  (Taking) Take 1 Tab by mouth daily. aspirin 81 mg tablet  (Taking) Take 1 Tab by mouth daily.     furosemide (LASIX) 20 mg tablet 1 tablet daily        Future Appointments  Date Time Provider Franchesca Gretchen   5/23/2018 1:30 PM Antonio Samuel MD [id number] [facility]   11/19/2018 9:40 AM Anu Tran  [address] Impression:   1. Apical variant hypertrophic cardiomyopathy ([address]    2. Chronic atrial fibrillation (HCC)    3. Diastolic dysfunction    4. Essential hypertension       Cardiac History:   Chronic atrial fibrillation   Diastolic CHF    ECHO 6-11-92= ef 55-60%, biatrial enlargement  ECHO 3- EF 70%, AK of apex with apical hypertrophy, mild TR,LAE, Pulm Htn    Admit Jan 2012 with asthma and May 2013; Chronic airway dz    Cardiac MRI     1. Apical hypertrophic cardiomyopathy. The apical septal wall measures 20   mm. The apical lateral and mid inferolateral wall measures 26 mm. Complete   obliteration of the LV apical cavity. Martin like shape of the LV cavity   typical of the apical hypertrophic cardiomyopathy. Hyperdynamic left   ventricular systolic function with end-systolic cavity obliteration. 3-D   LVEF 86%. There is no systolic anterior motion of the mitral apparatus or no   LV outflow tract obstruction. 2. Normal right ventricular size and systolic function. RVEF 60%. 3. Mild 1+ mitral regurgitation. 4. Moderate 2+ tricuspid regurgitation. 5. Normal rest myocardial perfusion on first pass perfusion imaging. 6. On EGE and LGE imaging, there is no areas myocardial enhancement to   suggest any myocardial scar, recent or old myocardial infarction,   infiltration or inflammation. There is no features of myocardial sarcoidosis   or amyloidosis. There is no features of inflammatory cardiomyopathy such as   myocarditis. 7. Normal pleura and pericardium. Trace anterior and posterior pericardial   effusion. 8. Markedly dilated RA and LA. ROS-except as noted above. . A complete cardiac and respiratory are reviewed and negative except as above ; Resp-denies wheezing  or productive cough,.  Const- No unusual weight loss or fever; Neuro-no recent seizure or CVA ; GI- No BRBPR, abdom pain, bloating ; - no  hematuria   see supplement sheet, initialed and to be scanned by staff  Past Medical History: Diagnosis Date    Apical variant hypert[facility]     Chronic diastolic heart failure (HCC)     HTN (hypertension)     Hx of mammogram     LVH (left ventricular hypertrophy) due to hypertensive disease     Mild pulmonary hypertension ([facility] [address] 9/13/2012    Obstructive sleep apnea (adult) (pediatric) 07-    AHI: 5.0 per hour    Pneumonia 2013 admit [address] Hx= reports that she quit smoking about 22 years ago. She has a 2.00 pack-year smoking history. She has never used smokeless tobacco. She reports that she does not drink alcohol or use illicit drugs. Exam and Labs:  /80 (BP 1 Location: Left arm, BP Patient Position: Sitting)  Pulse 90  Ht 5' 3\" (1.6 m)  Wt 190 lb 6.4 oz (86.4 kg)  SpO2 97%  BMI 33.73 kg/[id number]:  NAD, comfortable  Head: NC,AT. Eyes: No scleral icterus. Neck:  Neck supple. No JVD present. Throat: moist mucous membranes. Chest: Effort normal & normal respiratory excursion . Neurological: alert, conversant and oriented . Skin: Skin is not cold. No obvious systemic rash noted. Not diaphoretic. No erythema. Psychiatric:  Grossly normal mood and affect. Behavior appears normal. Extremities:  no clubbing or cyanosis. Abdomen: non distended    Lungs:breath sounds normal. No stridor. distress, wheezes or  Rales. Heart:Irregularly irregular normal S1, S2, no murmurs, rubs, clicks or gallops , PMI non displaced. Edema: Edema is none.   Lab Results   Component Value Date/Time    Cholesterol, total 101 11/22/2017 09:25 AM    HDL Cholesterol 35 (L) 11/22/2017 09:25 AM    LDL, calculated 49 11/22/2017 09:25 AM    Triglyceride 83 11/22/2017 09:25 AM    CHOL/HDL Ratio 5.1 (H) 05/12/2013 04:20 AM     Lab Results   Component Value Date/Time    Sodium 146 (H) 11/22/2017 09:25 AM    Potassium 3.9 11/22/2017 09:25 AM    Chloride 101 11/22/2017 09:25 AM    CO2 32 (H) 11/22/2017 09:25 AM    Anion gap 6 05/15/2013 07:10 AM    Glucose 113 (H) 11/22/2017 09:25 AM    BUN 17 11/22/2017 09:25 AM    Creatinine 0.78 11/22/2017 09:25 AM    BUN/Creatinine ratio 22 11/22/2017 09:25 AM    GFR est AA 87 11/22/2017 09:25 AM    GFR est non-AA 75 11/22/2017 09:25 AM    Calcium 9.3 11/22/2017 09:25 AM      Wt Readings from Last 3 Encounters:   05/17/18 190 lb 6.4 oz (86.4 kg)   04/13/18 190 lb (86.2 kg)   11/21/17 185 lb (83.9 kg)      BP Readings from Last 3 Encounters:   05/17/18 110/80   04/13/18 136/82   12/27/17 132/76      Current Outpatient Prescriptions   Medication Sig    FLUTICASONE PROPIONATE (FLONASE NA) by Nasal route as needed.  fexofenadine (ALLEGRA) 180 mg tablet Take 180 mg by mouth daily as needed for Allergies.  atorvastatin (LIPITOR) 10 mg tablet TAKE 1 TABLET BY MOUTH EVERY DAY    digoxin (LANOXIN) 0.125 mg tablet TAKE 1 TABLET BY MOUTH EVERY DAY    ELIQUIS 5 mg tablet TAKE 1 TAB BY MOUTH TWO (2) TIMES A DAY.  valsartan-hydroCHLOROthiazide (DIOVAN-HCT) 320-25 mg per tablet Take 1 Tab by mouth daily.  fluticasone-salmeterol (ADVAIR DISKUS) 250-50 mcg/dose diskus inhaler INHALE 1 PUFF BY MOUTH TWICE A DAY.  triamcinolone acetonide (KENALOG) 0.1 % topical cream APPLY A THIN LAYER TO AFFECTED AREA(S) AS DIRECTED TWICE A DAY AS NEEDED    albuterol-ipratropium (DUO-NEB) 2.5 mg-0.5 mg/3 ml nebu 3 mL by Nebulization route four (4) times daily. (Patient taking differently: 3 mL by Nebulization route as needed.)    albuterol (PROVENTIL HFA, VENTOLIN HFA, PROAIR HFA) 90 mcg/actuation inhaler Take 1 Puff by inhalation every six (6) hours as needed for Wheezing.  tiotropium (SPIRIVA) 18 mcg inhalation capsule Take 1 Cap by inhalation daily. (Patient taking differently: Take 1 Cap by inhalation as needed.)    aspirin 81 mg tablet Take 1 Tab by mouth daily.  cpap machine kit by Does Not Apply route nightly.  multivitamin (ONE A DAY) tablet Take 1 Tab by mouth daily.     furosemide (LASIX) 20 mg tablet 1 tablet daily (Patient taking differently: 20 mg as needed. 1 tablet daily)     No current facility-administered medications for this visit. Impression see above.       Written by Becki Farrell, as dictated by Primitivo Burgos MD.

## 2018-05-23 ENCOUNTER — OFFICE VISIT (OUTPATIENT)
Dept: INTERNAL MEDICINE CLINIC | Age: 73
End: 2018-05-23

## 2018-05-23 VITALS
HEIGHT: 62 IN | TEMPERATURE: 98.2 F | RESPIRATION RATE: 18 BRPM | DIASTOLIC BLOOD PRESSURE: 84 MMHG | BODY MASS INDEX: 34.96 KG/M2 | OXYGEN SATURATION: 97 % | WEIGHT: 190 LBS | HEART RATE: 90 BPM | SYSTOLIC BLOOD PRESSURE: 112 MMHG

## 2018-05-23 DIAGNOSIS — J41.0 SIMPLE CHRONIC BRONCHITIS (HCC): ICD-10-CM

## 2018-05-23 DIAGNOSIS — E11.21 TYPE 2 DIABETES WITH NEPHROPATHY (HCC): ICD-10-CM

## 2018-05-23 DIAGNOSIS — M54.50 CHRONIC BILATERAL LOW BACK PAIN WITHOUT SCIATICA: ICD-10-CM

## 2018-05-23 DIAGNOSIS — Z23 ENCOUNTER FOR IMMUNIZATION: ICD-10-CM

## 2018-05-23 DIAGNOSIS — Z12.39 BREAST CANCER SCREENING: ICD-10-CM

## 2018-05-23 DIAGNOSIS — Z13.39 SCREENING FOR ALCOHOLISM: ICD-10-CM

## 2018-05-23 DIAGNOSIS — Z71.89 ACP (ADVANCE CARE PLANNING): ICD-10-CM

## 2018-05-23 DIAGNOSIS — M89.9 DISORDER OF BONE AND CARTILAGE: ICD-10-CM

## 2018-05-23 DIAGNOSIS — G89.29 CHRONIC BILATERAL LOW BACK PAIN WITHOUT SCIATICA: ICD-10-CM

## 2018-05-23 DIAGNOSIS — E66.9 OBESITY (BMI 30.0-34.9): ICD-10-CM

## 2018-05-23 DIAGNOSIS — Z00.00 MEDICARE ANNUAL WELLNESS VISIT, SUBSEQUENT: Primary | ICD-10-CM

## 2018-05-23 DIAGNOSIS — E78.00 PURE HYPERCHOLESTEROLEMIA: ICD-10-CM

## 2018-05-23 DIAGNOSIS — M94.9 DISORDER OF BONE AND CARTILAGE: ICD-10-CM

## 2018-05-23 DIAGNOSIS — I10 ESSENTIAL HYPERTENSION: ICD-10-CM

## 2018-05-23 NOTE — ACP (ADVANCE CARE PLANNING)
Advance Care Planning (ACP) Provider Note - Comprehensive     Date of ACP Conversation: 05/23/18  Persons included in Conversation:  patient  Length of ACP Conversation in minutes: <16 minutes (Non-Billable)    Authorized Decision Maker (if patient is incapable of making informed decisions): This person is: Other Legally Authorized Decision Maker (e.g. Next of Kin)      She has NO advanced directive  - add't info provided. Reviewed DNR/DNI and patient is not interested. General ACP for ALL Patients with Decision Making Capacity:  Importance of advance care planning, including choosing a healthcare agent to communicate patient's healthcare decisions if patient lost the ability to make decisions, such as after a sudden illness or accident  Understanding of the healthcare agent role was assessed and information provided  Opportunity offered to explore how cultural, Yazidism, spiritual, or personal beliefs would affect decisions for future care  Exploration of values, goals, and preferences if recovery is not expected, even with continued medical treatment in the event of: Imminent death or severe, permanent brain injury    For Serious or Chronic Illness:  Understanding of CPR, goals and expected outcomes, benefits and burdens discussed.   Understanding of medical condition  Information on CPR success rates provided (e.g. for CPR in hospital, survival to d/c at two weeks is 22%, for chronically ill or elderly/frail survival is less than 3%)    Interventions Provided:  Recommended communicating the plan and making copies for the healthcare agent, personal physician, and others as appropriate (e.g., health system)  Recommended review of completed ACP document annually or upon change in health status

## 2018-05-23 NOTE — PATIENT INSTRUCTIONS
Medicare Wellness Visit, Female    The best way to live healthy is to have a lifestyle where you eat a well-balanced diet, exercise regularly, limit alcohol use, and quit all forms of tobacco/nicotine, if applicable. Regular preventive services are another way to keep healthy. Preventive services (vaccines, screening tests, monitoring & exams) can help personalize your care plan, which helps you manage your own care. Screening tests can find health problems at the earliest stages, when they are easiest to treat. [facility] follows the current, evidence-based guidelines published by the [facility] Rav[facility] Jose ([facility]) when recommending preventive services for our patients. Because we follow these guidelines, sometimes recommendations change over time as research supports it. (For example, mammograms used to be recommended annually. Even though Medicare will still pay for an annual mammogram, the newer guidelines recommend a mammogram every two years for women of average risk.)    Of course, you and your provider may decide to screen more often for some diseases, based on your risk and co-morbidities (chronic disease you are already diagnosed with). Preventive services for you include:    - Medicare offers their members a free annual wellness visit, which is time for you and your primary care provider to discuss and plan for your preventive service needs. Take advantage of this benefit every year!    -All people over age 72 should receive the recommended pneumonia vaccines. Current USPSTF guidelines recommend a series of two vaccines for the best pneumonia protection.     -All adults should have a yearly flu vaccine and a tetanus vaccine every 10 years. All adults age 61 years should receive a shingles vaccine once in their lifetime.      -A bone mass density test is recommended when a woman turns 65 to screen for osteoporosis.  This test is only recommended once as a screening. Some providers will use this same test as a disease monitoring tool if you already have osteoporosis. -All adults age 38-68 years who are overweight should have a diabetes screening test once every three years.     -Other screening tests & preventive services for persons with diabetes include: an eye exam to screen for diabetic retinopathy, a kidney function test, a foot exam, and stricter control over your cholesterol.     -Cardiovascular screening for adults with routine risk involves an electrocardiogram (ECG) at intervals determined by the provider.     -Colorectal cancer screenings should be done for adults age 54-65 years with normal risk. There are a number of acceptable methods of screening for this type of cancer. Each test has its own benefits and drawbacks. Discuss with your provider what is most appropriate for you during your annual wellness visit. The different tests include: colonoscopy (considered the best screening method), a fecal occult blood test, a fecal DNA test, and sigmoidoscopy. -Breast cancer screenings are recommended every other year for women of normal risk age 54-69 years.     -Cervical cancer screenings for women over age 72 are only recommended with certain risk factors.     -All adults born between [facility] should be screened once for Hepatitis C. Here is a list of your current Health Maintenance items (your personalized list of preventive services) with a due date:  Health Maintenance Due   Topic Date Due    DTaP/Tdap/Td  (1 - Tdap) 06/08/1964    Shingles Vaccine  04/08/2003    Pneumococcal Vaccine (2 of 2 - PPSV23) 01/27/2017    Eye Exam  06/23/2017    Breast Cancer Screening  11/19/2017    Annual Well Visit  05/18/2018    Hemoglobin A1C    05/22/2018    Glaucoma Screening   06/23/2018          Learning About Living [place]  What is a living will? A living will is a legal form you use to write down the kind of care you want at the end of your life. It is used by the health professionals who will treat you if you aren't able to decide for yourself. If you put your wishes in writing, your loved ones and others will know what kind of care you want. They won't need to guess. This can ease your mind and be helpful to others. A living will is not the same as an estate or property will. An estate will explains what you want to happen with your money and property after you die. Is a living will a legal document? A living will is a legal document. Each state has its own laws about living hoskins. If you move to another state, make sure that your living will is legal in the state where you now live. Or you might use a universal form that has been approved by many states. This kind of form can sometimes be completed and stored online. Your electronic copy will then be available wherever you have a connection to the Internet. In most cases, doctors will respect your wishes even if you have a form from a different state. · You don't need an  to complete a living will. But legal advice can be helpful if your state's laws are unclear, your health history is complicated, or your family can't agree on what should be in your living will. · You can change your living will at any time. Some people find that their wishes about end-of-life care change as their health changes. · In addition to making a living will, think about completing a medical power of  form. This form lets you name the person you want to make end-of-life treatment decisions for you (your \"health care agent\") if you're not able to. Many hospitals and nursing homes will give you the forms you need to complete a living will and a medical power of . · Your living will is used only if you can't make or communicate decisions for yourself anymore. If you become able to make decisions again, you can accept or refuse any treatment, no matter what you wrote in your living will.   · Your state may offer an online registry. This is a place where you can store your living will online so the doctors and nurses who need to treat you can find it right away. What should you think about when creating a living will? Talk about your end-of-life wishes with your family members and your doctor. Let them know what you want. That way the people making decisions for you won't be surprised by your choices. Think about these questions as you make your living will:  · Do you know enough about life support methods that might be used? If not, talk to your doctor so you know what might be done if you can't breathe on your own, your heart stops, or you're unable to swallow. · What things would you still want to be able to do after you receive life-support methods? Would you want to be able to walk? To speak? To eat on your own? To live without the help of machines? · If you have a choice, where do you want to be cared for? In your home? At a hospital or nursing home? · Do you want certain Sikh practices performed if you become very ill? · If you have a choice at the end of your life, where would you prefer to die? At home? In a hospital or nursing home? Somewhere else? · Would you prefer to be buried or cremated? · Do you want your organs to be donated after you die? What should you do with your living will? · Make sure that your family members and your health care agent have copies of your living will. · Give your doctor a copy of your living will to keep in your medical record. If you have more than one doctor, make sure that each one has a copy. · You may want to put a copy of your living will where it can be easily found. Where can you learn more? Go to http://brandy-giorgio.info/. Enter O700 in the search box to learn more about \"Learning About Living [place]. \"  Current as of: August 8, 2016  Content Version: 11.3  © 9911-3908 [employer].  Care instructions adapted under license by [address] (which disclaims liability or warranty for this information). If you have questions about a medical condition or this instruction, always ask your healthcare professional. [address] any warranty or liability for your use of this information.      ----------------------------------------------------------------           Low Back Pain: Exercises  Your Care Instructions  Here are some examples of typical rehabilitation exercises for your condition. Start each exercise slowly. Ease off the exercise if you start to have pain. Your doctor or physical therapist will tell you when you can start these exercises and which ones will work best for you. How to do the exercises  Press-up    1. Lie on your stomach, supporting your body with your forearms. 2. Press your elbows down into the floor to raise your upper back. As you do this, relax your stomach muscles and allow your back to arch without using your back muscles. As your press up, do not let your hips or pelvis come off the floor. 3. Hold for 15 to 30 seconds, then relax. 4. Repeat 2 to 4 times. Alternate arm and leg (bird dog) exercise    Do this exercise slowly. Try to keep your body straight at all times, and do not let one hip drop lower than the other. 1. Start on the floor, on your hands and knees. 2. Tighten your belly muscles. 3. Raise one leg off the floor, and hold it straight out behind you. Be careful not to let your hip drop down, because that will twist your trunk. 4. Hold for about 6 seconds, then lower your leg and switch to the other leg. 5. Repeat 8 to 12 times on each leg. 6. Over time, work up to holding for 10 to 30 seconds each time. 7. If you feel stable and secure with your leg raised, try raising the opposite arm straight out in front of you at the same time. Knee-to-chest exercise    1. Lie on your back with your knees bent and your feet flat on the floor.   2. Bring one knee to your chest, keeping the other foot flat on the floor (or keeping the other leg straight, whichever feels better on your lower back). 3. Keep your lower back pressed to the floor. Hold for at least 15 to 30 seconds. 4. Relax, and lower the knee to the starting position. 5. Repeat with the other leg. Repeat 2 to 4 times with each leg. 6. To get more stretch, put your other leg flat on the floor while pulling your knee to your chest.  Curl-ups    1. Lie on the floor on your back with your knees bent at a 90-degree angle. Your feet should be flat on the floor, about 12 inches from your buttocks. 2. Cross your arms over your chest. If this bothers your neck, try putting your hands behind your neck (not your head), with your elbows spread apart. 3. Slowly tighten your belly muscles and raise your shoulder blades off the floor. 4. Keep your head in line with your body, and do not press your chin to your chest.  5. Hold this position for 1 or 2 seconds, then slowly lower yourself back down to the floor. 6. Repeat 8 to 12 times. Pelvic tilt exercise    1. Lie on your back with your knees bent. 2. \"Brace\" your stomach. This means to tighten your muscles by pulling in and imagining your belly button moving toward your spine. You should feel like your back is pressing to the floor and your hips and pelvis are rocking back. 3. Hold for about 6 seconds while you breathe smoothly. 4. Repeat 8 to 12 times. Heel dig bridging    1. Lie on your back with both knees bent and your ankles bent so that only your heels are digging into the floor. Your knees should be bent about 90 degrees. 2. Then push your heels into the floor, squeeze your buttocks, and lift your hips off the floor until your shoulders, hips, and knees are all in a straight line. 3. Hold for about 6 seconds as you continue to breathe normally, and then slowly lower your hips back down to the floor and rest for up to 10 seconds.   4. Do 8 to 12 repetitions. Hamstring stretch in doorway    1. Lie on your back in a doorway, with one leg through the open door. 2. Slide your leg up the wall to straighten your knee. You should feel a gentle stretch down the back of your leg. 3. Hold the stretch for at least 15 to 30 seconds. Do not arch your back, point your toes, or bend either knee. Keep one heel touching the floor and the other heel touching the wall. 4. Repeat with your other leg. 5. Do 2 to 4 times for each leg. Hip flexor stretch    1. Kneel on the floor with one knee bent and one leg behind you. Place your forward knee over your foot. Keep your other knee touching the floor. 2. Slowly push your hips forward until you feel a stretch in the upper thigh of your rear leg. 3. Hold the stretch for at least 15 to 30 seconds. Repeat with your other leg. 4. Do 2 to 4 times on each side. Wall sit    1. Stand with your back 10 to 12 inches away from a wall. 2. Lean into the wall until your back is flat against it. 3. Slowly slide down until your knees are slightly bent, pressing your lower back into the wall. 4. Hold for about 6 seconds, then slide back up the wall. 5. Repeat 8 to 12 times. Follow-up care is a key part of your treatment and safety. Be sure to make and go to all appointments, and call your doctor if you are having problems. It's also a good idea to know your test results and keep a list of the medicines you take. Where can you learn more? Go to http://brandy-giorgio.info/. Enter V849 in the search box to learn more about \"Low Back Pain: Exercises. \"  Current as of: March 21, 2017  Content Version: 11.4  © 5368-6216 [employer]. Care instructions adapted under license by [employer] (which disclaims liability or warranty for this information).  If you have questions about a medical condition or this instruction, always ask your healthcare professional. [address]  any warranty or liability for your use of this information.

## 2018-05-23 NOTE — PROGRESS NOTES
Annual wellness. Sister critically ill for perforated ulcer, has had 2 surgeries. Reports has spells of weakness.

## 2018-05-23 NOTE — MR AVS SNAPSHOT
[address] [facility] [address] [place] 
[phone] Patient: Jayashree Scott MRN: [id number] Visit Information Date & Time Provider Department Dept. Phone Encounter #  
 5/23/2018  1:30 PM Tata Escamilla [address] [place] [address] Internal Medicine [phone] [id number] Follow-up Instructions Return in about 6 months (around 11/23/2018), or if symptoms worsen or fail to improve. Your Appointments 11/19/2018  9:40 AM  
ESTABLISHED PATIENT with Behzad Mcallister MD  
[facility] (KYLE SCHEDULING) Appt Note: 6 mo f/u per Dr. Jeff Garcia [address]  [address] [place] [address]  
[address] [address] Upcoming Health Maintenance Date Due DTaP/Tdap/Td series (1 - Tdap) 6/8/1964 ZOSTER VACCINE AGE 60> 4/8/2003 Pneumococcal 65+ Low/Medium Risk (2 of 2 - PPSV23) 1/27/2017 EYE EXAM RETINAL OR DILATED Q1 6/23/2017 BREAST CANCER SCRN MAMMOGRAM 11/19/2017 MEDICARE YEARLY EXAM 5/18/2018 HEMOGLOBIN A1C Q6M 5/22/2018 GLAUCOMA SCREENING Q2Y 6/23/2018 Influenza Age 5 to Adult 8/1/2018 FOOT EXAM Q1 11/17/2018 MICROALBUMIN Q1 11/22/2018 LIPID PANEL Q1 11/22/2018 Allergies as of 5/23/2018  Review Complete On: 5/23/2018 By: Tata Escamilla MD  
  
 Severity Noted Reaction Type Reactions Cetirizine Medium 10/18/2016    Hives Other reaction(s): Hives Amlodipine  01/03/2014   Side Effect Swelling  
 lower extremity swelling Coreg [Carvedilol]  04/29/2014   Side Effect Other (comments)  
 sweating Erythromycin  09/29/2010    Hives Verapamil Low 01/31/2012   Not Verified Other (comments) Patient unable to take verapamil for rate control secondary to peripheral edema/maliase/feeling of being high. Current Immunizations  Reviewed on 5/23/2018 Name Date Influenza High Dose Vaccine PF 8/31/2017 12:00 AM, 11/6/2015 Influenza Vaccine Split 1/28/2012 12:03 AM, 3/31/2011  2:45 AM  
 ZZZ-RETIRED (DO NOT USE) Pneumococcal Vaccine (Unspecified Type) 1/27/2012 11:56 PM  
  
 Reviewed by Hortencia Tucker RN on 5/23/2018 at  1:12 PM  
You Were Diagnosed With   
  
 Codes Comments Medicare annual wellness visit, subsequent    -  Primary ICD-10-CM: Z00.00 ICD-9-CM: V70.0 ACP (advance care planning)     ICD-10-CM: Z71.89 ICD-9-CM: V65.49 Encounter for immunization     ICD-10-CM: Z71 ICD-9-CM: V03.89 Screening for alcoholism     ICD-10-CM: Z13.89 ICD-9-CM: V79.1 Disorder of bone and cartilage     ICD-10-CM: M89.9, M94.9 ICD-9-CM: 733.90 Type 2 diabetes with nephropathy (HCC)     ICD-10-CM: E11.21 
ICD-9-CM: 250.40, 583.81 Essential hypertension     ICD-10-CM: I10 
ICD-9-CM: 401.9 Pure hypercholesterolemia     ICD-10-CM: E78.00 ICD-9-CM: 272.0 Simple chronic bronchitis (HCC)     ICD-10-CM: J41.0 ICD-9-CM: 491.0 Obesity (BMI 30.0-34.9)     ICD-10-CM: D67.3 ICD-9-CM: 278.00 Breast cancer screening     ICD-10-CM: Z12.31 
ICD-9-CM: V76.10 Vitals BP Pulse Temp Resp Height(growth percentile) Weight(growth percentile) 112/84 90 98.2 °F (36.8 °C) (Oral) 18 5' 2.15\" (1.579 m) 190 lb (86.2 kg) SpO2 BMI OB Status Smoking Status 97% 34.58 kg/m2 Hysterectomy Former Smoker Vitals History BMI and BSA Data Body Mass Index Body Surface Area 34.58 kg/m 2 1.94 m 2 Preferred Pharmacy Pharmacy Name Phone [id number] [phone] Your Updated Medication List  
  
   
This list is accurate as of 5/23/18  1:49 PM.  Always use your most recent med list.  
  
  
  
  
 albuterol 90 mcg/actuation inhaler Commonly known as:  PROVENTIL HFA, VENTOLIN HFA, PROAIR HFA Take 1 Puff by inhalation every six (6) hours as needed for Wheezing. albuterol-ipratropium 2.5 mg-0.5 mg/3 ml Nebu Commonly known as:  DUO-NEB  
3 mL by Nebulization route four (4) times daily. aspirin 81 mg tablet Take 1 Tab by mouth daily. atorvastatin 10 mg tablet Commonly known as:  LIPITOR  
TAKE 1 TABLET BY MOUTH EVERY DAY  
  
 cpap machine kit  
by Does Not Apply route nightly. digoxin 0.125 mg tablet Commonly known as:  LANOXIN  
TAKE 1 TABLET BY MOUTH EVERY DAY  
  
 diph,Pertuss(Acell),Tet Vac-PF 2 Lf-(2.5-5-3-5 mcg)-5Lf/0.5 mL susp Commonly known as:  ADACEL  
0.5 mL by IntraMUSCular route once for 1 dose. ELIQUIS 5 mg tablet Generic drug:  apixaban TAKE 1 TAB BY MOUTH TWO (2) TIMES A DAY. fexofenadine 180 mg tablet Commonly known as:  Dooley Mail Take 180 mg by mouth daily as needed for Allergies. FLONASE NA  
by Nasal route as needed. fluticasone-salmeterol 250-50 mcg/dose diskus inhaler Commonly known as:  ADVAIR DISKUS INHALE 1 PUFF BY MOUTH TWICE A DAY. furosemide 20 mg tablet Commonly known as:  LASIX  
1 tablet daily  
  
 multivitamin tablet Commonly known as:  ONE A DAY Take 1 Tab by mouth daily. pneumococcal 13 allie conj dip 0.5 mL Syrg injection Commonly known as:  PREVNAR-13  
0.5 mL by IntraMUSCular route once for 1 dose. tiotropium 18 mcg inhalation capsule Commonly known as:  Brandon Pong Take 1 Cap by inhalation daily. triamcinolone acetonide 0.1 % topical cream  
Commonly known as:  KENALOG  
APPLY A THIN LAYER TO AFFECTED AREA(S) AS DIRECTED TWICE A DAY AS NEEDED  
  
 valsartan-hydroCHLOROthiazide 320-25 mg per tablet Commonly known as:  DIOVAN-HCT Take 1 Tab by mouth daily. varicella-zoster recombinant (PF) 50 mcg/0.5 mL Susr injection Commonly known as:  SHINGRIX (PF)  
0.5 ml IM once and then repeat in 2-6 months. Prescriptions Printed  Refills  
 pneumococcal 13 allie conj dip (PREVNAR-13) 0.5 mL syrg injection 0  
 Si.5 mL by IntraMUSCular route once for 1 dose. Class: Print Route: IntraMUSCular  
 diph,Pertuss,Acell,,Tet Vac-PF (ADACEL) 2 Lf-(2.5-5-3-5 mcg)-5Lf/0.5 mL susp 0 Si.5 mL by IntraMUSCular route once for 1 dose. Class: Print Route: IntraMUSCular  
 varicella-zoster recombinant, PF, (SHINGRIX, PF,) 50 mcg/0.5 mL susr injection 1 Si.5 ml IM once and then repeat in 2-6 months. Class: Print We Performed the Following HEMOGLOBIN A1C WITH EAG [05260 CPT(R)] HM DIABETES FOOT EXAM [HM7 Custom] METABOLIC PANEL, COMPREHENSIVE [62429 CPT(R)] ME ANNUAL ALCOHOL SCREEN 15 MIN [id number] [facility]] REFERRAL TO OPHTHALMOLOGY [REF57 Custom] Comments:  
 Please evaluate patient for annual eye exam.  
  
Follow-up Instructions Return in about 6 months (around 2018), or if symptoms worsen or fail to improve. To-Do List   
 2018 Imaging:  GERARDO MAMMO BI SCREENING INCL CAD   
  
 2018 Imaging:  DEXA BONE DENSITY STUDY AXIAL Referral Information Referral ID Referred By Referred To  
  
 [id number] WALTER COVARRUBIAS Not Available Visits Status Start Date End Date 1 New Request 18 If your referral has a status of pending review or denied, additional information will be sent to support the outcome of this decision. Patient Instructions Medicare Wellness Visit, Female The best way to live healthy is to have a lifestyle where you eat a well-balanced diet, exercise regularly, limit alcohol use, and quit all forms of tobacco/nicotine, if applicable. Regular preventive services are another way to keep healthy. Preventive services (vaccines, screening tests, monitoring & exams) can help personalize your care plan, which helps you manage your own care. Screening tests can find health problems at the earliest stages, when they are easiest to treat. [address] [facility] follows the current, evidence-based guidelines published by the [facility] States Ravinder Garcia ([facility]) when recommending preventive services for our patients. Because we follow these guidelines, sometimes recommendations change over time as research supports it. (For example, mammograms used to be recommended annually. Even though Medicare will still pay for an annual mammogram, the newer guidelines recommend a mammogram every two years for women of average risk.) Of course, you and your provider may decide to screen more often for some diseases, based on your risk and co-morbidities (chronic disease you are already diagnosed with). Preventive services for you include: - Medicare offers their members a free annual wellness visit, which is time for you and your primary care provider to discuss and plan for your preventive service needs. Take advantage of this benefit every year! 
 
-All people over age 72 should receive the recommended pneumonia vaccines. Current USPSTF guidelines recommend a series of two vaccines for the best pneumonia protection.  
 
-All adults should have a yearly flu vaccine and a tetanus vaccine every 10 years. All adults age 61 years should receive a shingles vaccine once in their lifetime.   
 
-A bone mass density test is recommended when a woman turns 65 to screen for osteoporosis. This test is only recommended once as a screening. Some providers will use this same test as a disease monitoring tool if you already have osteoporosis.  
 
-All adults age 38-68 years who are overweight should have a diabetes screening test once every three years.  
 
-Other screening tests & preventive services for persons with diabetes include: an eye exam to screen for diabetic retinopathy, a kidney function test, a foot exam, and stricter control over your cholesterol.  
 
-Cardiovascular screening for adults with routine risk involves an electrocardiogram (ECG) at intervals determined by the provider.  
 
-Colorectal cancer screenings should be done for adults age 54-65 years with normal risk. There are a number of acceptable methods of screening for this type of cancer. Each test has its own benefits and drawbacks. Discuss with your provider what is most appropriate for you during your annual wellness visit. The different tests include: colonoscopy (considered the best screening method), a fecal occult blood test, a fecal DNA test, and sigmoidoscopy. -Breast cancer screenings are recommended every other year for women of normal risk age 54-69 years.  
 
-Cervical cancer screenings for women over age 72 are only recommended with certain risk factors.  
 
-All adults born between [facility] should be screened once for Hepatitis C. Here is a list of your current Health Maintenance items (your personalized list of preventive services) with a due date: 
Health Maintenance Due Topic Date Due  
 DTaP/Tdap/Td  (1 - Tdap) 06/08/1964  Shingles Vaccine  04/08/2003  Pneumococcal Vaccine (2 of 2 - PPSV23) 01/27/2017 [address] [place] [address] Eye Exam  06/23/2017  Breast Cancer Screening  11/19/2017 [address] [place] [address] Annual Well Visit  05/18/2018  Hemoglobin A1C    05/22/2018  Glaucoma Screening   06/23/2018 Jaqui Coates [address] What is a living will? A living will is a legal form you use to write down the kind of care you want at the end of your life. It is used by the health professionals who will treat you if you aren't able to decide for yourself. If you put your wishes in writing, your loved ones and others will know what kind of care you want. They won't need to guess. This can ease your mind and be helpful to others. A living will is not the same as an estate or property will. An estate will explains what you want to happen with your money and property after you die. Is a living will a legal document? A living will is a legal document. Each state has its own laws about living hoskins. If you move to another state, make sure that your living will is legal in the state where you now live. Or you might use a universal form that has been approved by many states. This kind of form can sometimes be completed and stored online. Your electronic copy will then be available wherever you have a connection to the Internet. In most cases, doctors will respect your wishes even if you have a form from a different state. · You don't need an  to complete a living will. But legal advice can be helpful if your state's laws are unclear, your health history is complicated, or your family can't agree on what should be in your living will. · You can change your living will at any time. Some people find that their wishes about end-of-life care change as their health changes. · In addition to making a living will, think about completing a medical power of  form. This form lets you name the person you want to make end-of-life treatment decisions for you (your \"health care agent\") if you're not able to. Many hospitals and nursing homes will give you the forms you need to complete a living will and a medical power of . · Your living will is used only if you can't make or communicate decisions for yourself anymore. If you become able to make decisions again, you can accept or refuse any treatment, no matter what you wrote in your living will. · Your state may offer an online registry. This is a place where you can store your living will online so the doctors and nurses who need to treat you can find it right away. What should you think about when creating a living will? Talk about your end-of-life wishes with your family members and your doctor. Let them know what you want. That way the people making decisions for you won't be surprised by your choices. Think about these questions as you make your living will: · Do you know enough about life support methods that might be used? If not, talk to your doctor so you know what might be done if you can't breathe on your own, your heart stops, or you're unable to swallow. · What things would you still want to be able to do after you receive life-support methods? Would you want to be able to walk? To speak? To eat on your own? To live without the help of machines? · If you have a choice, where do you want to be cared for? In your home? At a hospital or nursing home? · Do you want certain Church practices performed if you become very ill? · If you have a choice at the end of your life, where would you prefer to die? At home? In a hospital or nursing home? Somewhere else? · Would you prefer to be buried or cremated? · Do you want your organs to be donated after you die? What should you do with your living will? · Make sure that your family members and your health care agent have copies of your living will. · Give your doctor a copy of your living will to keep in your medical record. If you have more than one doctor, make sure that each one has a copy. · You may want to put a copy of your living will where it can be easily found. Where can you learn more? Go to http://brandy-giorgio.info/. Enter C780 in the search box to learn more about \"Learning About Living Hildred Faster. \" Current as of: August 8, 2016 Content Version: 11.3 © 2238-2241 [employer]. Care instructions adapted under license by [employer] (which disclaims liability or warranty for this information). If you have questions about a medical condition or this instruction, always ask your healthcare professional. [address] any warranty or liability for your use of this information. 
 
 
---------------------------------------------------------------- Low Back Pain: Exercises Your Care Instructions Here are some examples of typical rehabilitation exercises for your condition. Start each exercise slowly. Ease off the exercise if you start to have pain. Your doctor or physical therapist will tell you when you can start these exercises and which ones will work best for you. How to do the exercises Press-up 1. Lie on your stomach, supporting your body with your forearms. 2. Press your elbows down into the floor to raise your upper back. As you do this, relax your stomach muscles and allow your back to arch without using your back muscles. As your press up, do not let your hips or pelvis come off the floor. 3. Hold for 15 to 30 seconds, then relax. 4. Repeat 2 to 4 times. Alternate arm and leg (bird dog) exercise Do this exercise slowly. Try to keep your body straight at all times, and do not let one hip drop lower than the other. 1. Start on the floor, on your hands and knees. 2. Tighten your belly muscles. 3. Raise one leg off the floor, and hold it straight out behind you. Be careful not to let your hip drop down, because that will twist your trunk. 4. Hold for about 6 seconds, then lower your leg and switch to the other leg. 5. Repeat 8 to 12 times on each leg. 6. Over time, work up to holding for 10 to 30 seconds each time. 7. If you feel stable and secure with your leg raised, try raising the opposite arm straight out in front of you at the same time. Knee-to-chest exercise 1. Lie on your back with your knees bent and your feet flat on the floor. 2. Bring one knee to your chest, keeping the other foot flat on the floor (or keeping the other leg straight, whichever feels better on your lower back). 3. Keep your lower back pressed to the floor. Hold for at least 15 to 30 seconds. 4. Relax, and lower the knee to the starting position. 5. Repeat with the other leg. Repeat 2 to 4 times with each leg.  
6. To get more stretch, put your other leg flat on the floor while pulling your knee to your chest. 
Curl-ups 1. Lie on the floor on your back with your knees bent at a 90-degree angle. Your feet should be flat on the floor, about 12 inches from your buttocks. 2. Cross your arms over your chest. If this bothers your neck, try putting your hands behind your neck (not your head), with your elbows spread apart. 3. Slowly tighten your belly muscles and raise your shoulder blades off the floor. 4. Keep your head in line with your body, and do not press your chin to your chest. 
5. Hold this position for 1 or 2 seconds, then slowly lower yourself back down to the floor. 6. Repeat 8 to 12 times. Pelvic tilt exercise 1. Lie on your back with your knees bent. 2. \"Brace\" your stomach. This means to tighten your muscles by pulling in and imagining your belly button moving toward your spine. You should feel like your back is pressing to the floor and your hips and pelvis are rocking back. 3. Hold for about 6 seconds while you breathe smoothly. 4. Repeat 8 to 12 times. Heel dig bridging 1. Lie on your back with both knees bent and your ankles bent so that only your heels are digging into the floor. Your knees should be bent about 90 degrees. 2. Then push your heels into the floor, squeeze your buttocks, and lift your hips off the floor until your shoulders, hips, and knees are all in a straight line. 3. Hold for about 6 seconds as you continue to breathe normally, and then slowly lower your hips back down to the floor and rest for up to 10 seconds. 4. Do 8 to 12 repetitions. Hamstring stretch in doorway 1. Lie on your back in a doorway, with one leg through the open door. 2. Slide your leg up the wall to straighten your knee. You should feel a gentle stretch down the back of your leg. 3. Hold the stretch for at least 15 to 30 seconds. Do not arch your back, point your toes, or bend either knee. Keep one heel touching the floor and the other heel touching the wall. 4. Repeat with your other leg. 5. Do 2 to 4 times for each leg. Hip flexor stretch 1. Kneel on the floor with one knee bent and one leg behind you. Place your forward knee over your foot. Keep your other knee touching the floor. 2. Slowly push your hips forward until you feel a stretch in the upper thigh of your rear leg. 3. Hold the stretch for at least 15 to 30 seconds. Repeat with your other leg. 4. Do 2 to 4 times on each side. Wall sit 1. Stand with your back 10 to 12 inches away from a wall. 2. Lean into the wall until your back is flat against it. 3. Slowly slide down until your knees are slightly bent, pressing your lower back into the wall. 4. Hold for about 6 seconds, then slide back up the wall. 5. Repeat 8 to 12 times. Follow-up care is a key part of your treatment and safety. Be sure to make and go to all appointments, and call your doctor if you are having problems. It's also a good idea to know your test results and keep a list of the medicines you take. Where can you learn more? Go to http://brandy-giorgio.info/. Enter D498 in the search box to learn more about \"Low Back Pain: Exercises. \" Current as of: March 21, 2017 Content Version: 11.4 © 2255-2831 [employer]. Care instructions adapted under license by [employer] (which disclaims liability or warranty for this information). If you have questions about a medical condition or this instruction, always ask your healthcare professional. [place] [address] any warranty or liability for your use of this information. Introducing [facility]! [employer] introduces [employer] patient portal. Now you can access parts of your medical record, email your doctor's office, and request medication refills online. 1. In your internet browser, go to [email or web address] 2. Click on the First Time User? Click Here link in the Sign In box.  You will see the New Member Sign Up page. 3. Enter your Free All Media Access Code exactly as it appears below. You will not need to use this code after youve completed the sign-up process. If you do not sign up before the expiration date, you must request a new code. · Free [employer] Access Code: [email or web address] Expires: 7/12/2018 12:47 PM 
 
4. Enter the last four digits of your Social Security Number (xxxx) and Date of Birth (mm/dd/yyyy) as indicated and click Submit. You will be taken to the next sign-up page. 5. Create a Free All Media ID. This will be your Free All [employer] login ID and cannot be changed, so think of one that is secure and easy to remember. 6. Create a Free [employer] password. You can change your password at any time. 7. Enter your Password Reset Question and Answer. This can be used at a later time if you forget your password. 8. Enter your e-mail address. You will receive e-mail notification when new information is available in [address]. 9. Click Sign Up. You can now view and download portions of your medical record. 10. Click the Download Summary menu link to download a portable copy of your medical information. If you have questions, please visit the Frequently Asked Questions section of the Free [employer] website. Remember, Free [employer] is NOT to be used for urgent needs. For medical emergencies, dial 911. Now available from your iPhone and Android! Please provide this summary of care documentation to your next provider. Your primary care clinician is listed as Levander Canavan. If you have any questions after today's visit, please call [phone].

## 2018-05-23 NOTE — PROGRESS NOTES
José Antonio Sofia is a 74 y.o. female who was seen in clinic today (5/23/2018) for a full physical.  Sister is currently in [facility] ICU for perforated ulcer. She is worried. Assessment & Plan:   Diagnoses and all orders for this visit:    1. Medicare annual wellness visit, subsequent  -     REFERRAL TO OPHTHALMOLOGY    2. ACP (advance care planning)    3. Encounter for immunization  -     pneumococcal 13 allie conj dip (PREVNAR-13) 0.5 mL syrg injection; 0.5 mL by IntraMUSCular route once for 1 dose.  -     diph,Pertuss,Acell,,Tet Vac-PF (ADACEL) 2 Lf-(2.5-5-3-5 mcg)-5Lf/0.5 mL susp; 0.5 mL by IntraMUSCular route once for 1 dose.  -     varicella-zoster recombinant, PF, (SHINGRIX, PF,) 50 mcg/0.5 mL susr injection; 0.5 ml IM once and then repeat in 2-6 months. 4. Screening for alcoholism  -     Annual  Alcohol Screen 15 min ()    5. Disorder of bone and cartilage  -     Dexa Bone Density Study Axial ([id number]); Future    6. Type 2 diabetes with nephropathy ([facility]- well controlled, home glucose monitoring emphasized, long term diabetic complications discussed, reviewed following up with specialists and/or referrals placed below and continue current plan pending review of labs. -     METABOLIC PANEL, COMPREHENSIVE  -     HEMOGLOBIN A1C WITH EAG  -      DIABETES FOOT EXAM    7. Essential hypertension- at goal, continue current treatment pending review of labs   -     METABOLIC PANEL, COMPREHENSIVE    8. Pure hypercholesterolemia- well controlled, continue current treatment pending review of labs   -     METABOLIC PANEL, COMPREHENSIVE    9. Simple chronic bronchitis ([facility] [address]- asymptomatic, previous bronchitis has resolved, no changes    10. Obesity (BMI 30.0-34.9)- poorly controlled, needs improvement, worsening, I have reviewed/discussed the above normal BMI with the patient. I have recommended the following interventions: encourage exercise and lifestyle education regarding diet.      11. Breast cancer screening  -     [facility] MAMMO BI SCREENING INCL CAD; Future     12. Lower back pain- this is a chronic problem but new to me, symptoms are: on/off, differential dx reviewed with the patient, favor muscular. Will treat with: heat, stretching. See AVS, Red flags were reviewed with the patient to RTC or notify me, expected time course for resolution reviewed. To PT if no changes. Follow-up Disposition:  Return in about 6 months (around 11/23/2018), or if symptoms worsen or fail to improve.        ------------------------------------------------------------------------------------------    Subjective: Annual Wellness Visit- Subsequent Visit    End of Life Planning: This was discussed with her today and she has NO advanced directive  - add't info provided. Reviewed DNR/DNI and patient is not interested. Depression Screen:  PHQ over the last two weeks 5/23/2018   Little interest or pleasure in doing things Not at all   Feeling down, depressed or hopeless Not at all   Total Score PHQ 2 0         Fall Risk:   Fall Risk Assessment, last 12 mths 5/23/2018   Able to walk? Yes   Fall in past 12 months? No   Fall with injury? -   Number of falls in past 12 months -   Fall Risk Score -       Abuse Screen:  Abuse Screening Questionnaire 5/23/2018   Do you ever feel afraid of your partner? N   Are you in a relationship with someone who physically or mentally threatens you? N   Is it safe for you to go home? Y         Alcohol Risk Factor Screening: On any occasion during the past 3 months, have you had more than 3 drinks containing alcohol? No  Do you average more than 7 drinks per week? No    Hearing Loss: Hearing is good. Cognition Screen:  Has your family/caregiver stated any concerns about your memory: no    Activities of Daily Living:    Requires assistance with: no ADLs  Home contains: grab bars  Exercise: moderately active    Adult Nutrition Screen:  No risk factors noted.        Health Maintenance  Daily Aspirin: yes  Bone Density: not UTD - order placed, last done on 1/20/14  Glaucoma Screening: No: she will schedule f/u    Immunizations:    Influenza: up to date. Tetanus: not UTD- script provided. Shingles: due for Shingrix - script provided. Pneumonia: due for Prevnar & script provided. Cancer screening:    Cervical: reviewed guidelines, n/a. Breast: she reports having been called to schedule f/u, reviewed guidelines, reviewed SBE with her. Colon: she declined further testing, has not done FIT the last 3 yrs, guidelines reviewed. Patient Care Team:  Karo Mason MD as PCP - General (Internal Medicine)  Mya Livingston, RN as Ambulatory Care Navigator (Internal Medicine)  Keyla Neumann RN as [address] (Cardiology)  Marie Feliciano MD (Cardiology)  Brenton Suarez MD (Sleep Medicine)  Farhat Potter MD (Cardiology)       The following sections were reviewed & updated as appropriate: PMH, PSH, FH, and SH. Endocrine Review  She is seen for diabetes and obesity. Since last visit she reports no significant changes.  She reports medication compliance: n/a - patient not on medications (diet controlled).  Diabetic diet compliance: compliant all of the time. Lab review: labs reviewed and discussed with patient. Eye exam: overdue.      Cardiovascular Review  The patient has hypertension and hyperlipidemia. Since last visit her weight is up. She reports taking medications as instructed, she reports some off balance feelign since starting coreg.  Home BP monitoring is not done regularly. Diet and Lifestyle: generally follows a low fat low cholesterol diet, generally follows a low sodium diet, she is walking regularly. Lab review: labs reviewed and discussed with patient.      Pulmonary Review  The patient is being seen for COPD.  she was seen last month for bronchitis, this has resolved. Current limitations in activity: none. Coughing when present is described as none. Wheezing is not present. Regimen compliance: using as directed. She is not using albuterol. 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 9) E66.9    Pure hypercholesterolemia E78.00    Type 2 diabetes with nephropathy ([facility] E11.21          Prior to Admission medications    Medication Sig Start Date End Date Taking? Authorizing Provider   FLUTICASONE PROPIONATE (FLONASE NA) by Nasal route as needed. Yes Historical Provider   fexofenadine (ALLEGRA) 180 mg tablet Take 180 mg by mouth daily as needed for Allergies. Yes Historical Provider   atorvastatin (LIPITOR) 10 mg tablet TAKE 1 TABLET BY MOUTH EVERY DAY 3/21/18  Yes Anali Pereira MD   digoxin (LANOXIN) 0.125 mg tablet TAKE 1 TABLET BY MOUTH EVERY DAY 3/1/18  Yes Anny Carvalho MD   ELIQUIS 5 mg tablet TAKE 1 TAB BY MOUTH TWO (2) TIMES A DAY. 12/21/17  Yes Anny Caravlho MD   valsartan-hydroCHLOROthiazide (DIOVAN-HCT) 320-25 mg per tablet Take 1 Tab by mouth daily. 11/17/17  Yes Anali Pereira MD   fluticasone-salmeterol (ADVAIR DISKUS) 250-50 mcg/dose diskus inhaler INHALE 1 PUFF BY MOUTH TWICE A DAY.  10/13/17  Yes Anali Pereira MD   triamcinolone acetonide (KENALOG) 0.1 % topical cream APPLY A THIN LAYER TO AFFECTED AREA(S) AS DIRECTED TWICE A DAY AS NEEDED 5/17/17  Yes Anali Pereira MD   albuterol-ipratropium (DUO-NEB) 2.5 mg-0.5 mg/3 ml nebu 3 mL by Nebulization route four (4) times daily. Patient taking differently: 3 mL by Nebulization route as needed. 5/10/16  Yes Octavio Torres MD   albuterol (PROVENTIL HFA, VENTOLIN HFA, PROAIR HFA) 90 mcg/actuation inhaler Take 1 Puff by inhalation every six (6) hours as needed for Wheezing. 4/19/16  Yes Octavio Torres MD   furosemide (LASIX) 20 mg tablet 1 tablet daily  Patient taking differently: 20 mg as needed. 1 tablet daily 7/14/14  Yes Octavio Torres MD   tiotropium [facility]) 18 mcg inhalation capsule Take 1 Cap by inhalation daily. Patient taking differently: Take 1 Cap by inhalation as needed. 7/14/14  Yes Octavio Torres MD   aspirin 81 mg tablet Take 1 Tab by mouth daily. 1/9/14  Yes Octavio Torres MD   cpap machine kit by Does Not Apply route nightly. Yes Historical Provider   multivitamin (ONE A DAY) tablet Take 1 Tab by mouth daily. Yes Historical Provider          Allergies   Allergen Reactions    Cetirizine Hives     Other reaction(s): Hives    Amlodipine Swelling     lower extremity swelling    Coreg [Carvedilol] Other (comments)     sweating    Erythromycin Hives    Verapamil Other (comments)     Patient unable to take verapamil for rate control secondary to peripheral edema/maliase/feeling of being high. Review of Systems   Constitutional: Negative for malaise/fatigue and weight loss. Respiratory: Negative for cough and shortness of breath. Cardiovascular: Negative for chest pain, palpitations and leg swelling. Gastrointestinal: Negative for abdominal pain, constipation, diarrhea, heartburn, nausea and vomiting. Genitourinary: Negative for frequency. Musculoskeletal: Positive for back pain (on/off, no trauma, no obvious triggers). Negative for joint pain and myalgias. Skin: Negative for rash. Neurological: Positive for dizziness (on/off). Negative for tingling, sensory change, focal weakness and headaches. Psychiatric/Behavioral: Negative for depression.  The patient is not nervous/anxious and does not have insomnia. Objective:   Physical Exam   Constitutional: She appears well-developed. No distress. obese   HENT:   Mouth/Throat: Mucous membranes are normal.   Eyes: Conjunctivae and lids are normal. No scleral icterus. Neck: Neck supple. No thyromegaly present. Cardiovascular: Regular rhythm and normal heart sounds. No murmur heard. Pulmonary/Chest: Effort normal and breath sounds normal. She has no wheezes. She has no rales. Abdominal: Soft. Bowel sounds are normal. She exhibits no mass. There is no hepatosplenomegaly. There is no tenderness. Musculoskeletal: She exhibits no edema. Lumbar back: She exhibits normal range of motion, no tenderness, no bony tenderness, no pain and no spasm. Lymphadenopathy:     She has no cervical adenopathy. Neurological:        Left Foot: Inspection: normal.  Pulse DP: 2+ (normal). Filament test: normal sensation with micro filament. Right Foot: Inspection: normal.  Pulse DP: 2+ (normal). Filament test: normal sensation with micro filament. Skin: No rash noted. Psychiatric: She has a normal mood and affect. Her behavior is normal.          Visit Vitals    /84    Pulse 90    Temp 98.2 °F (36.8 °C) (Oral)    Resp 18    Ht 5' 2.15\" (1.579 m)    Wt 190 lb (86.2 kg)    SpO2 97%    BMI 34.58 kg/m2          Advised her to call back or return to office if symptoms worsen/change/persist.  Discussed expected course/resolution/complications of diagnosis in detail with patient. Medication risks/benefits/costs/interactions/alternatives discussed with patient. She was given an after visit summary which includes diagnoses, current medications, & vitals. She expressed understanding with the diagnosis and plan. Aspects of this note may have been generated using voice recognition software. Despite editing, there may be some syntax errors.        Bryant Rosales MD

## 2018-05-24 DIAGNOSIS — I10 ESSENTIAL HYPERTENSION: ICD-10-CM

## 2018-05-24 DIAGNOSIS — E11.9 CONTROLLED TYPE 2 DIABETES MELLITUS WITHOUT COMPLICATION, WITHOUT LONG-TERM CURRENT USE OF INSULIN (HCC): ICD-10-CM

## 2018-05-24 LAB
ALBUMIN SERPL-MCNC: 4.1 G/DL (ref 3.5–4.8)
ALBUMIN/GLOB SERPL: 1.6 {RATIO} (ref 1.2–2.2)
ALP SERPL-CCNC: 99 IU/L (ref 39–117)
ALT SERPL-CCNC: 12 IU/L (ref 0–32)
AST SERPL-CCNC: 14 IU/L (ref 0–40)
BILIRUB SERPL-MCNC: 0.5 MG/DL (ref 0–1.2)
BUN SERPL-MCNC: 10 MG/DL (ref 8–27)
BUN/CREAT SERPL: 15 (ref 12–28)
CALCIUM SERPL-MCNC: 9 MG/DL (ref 8.7–10.3)
CHLORIDE SERPL-SCNC: 100 MMOL/L (ref 96–106)
CO2 SERPL-SCNC: 29 MMOL/L (ref 18–29)
CREAT SERPL-MCNC: 0.68 MG/DL (ref 0.57–1)
EST. AVERAGE GLUCOSE BLD GHB EST-MCNC: 146 MG/DL
GFR SERPLBLD CREATININE-BSD FMLA CKD-EPI: 100 ML/MIN/1.73
GFR SERPLBLD CREATININE-BSD FMLA CKD-EPI: 86 ML/MIN/1.73
GLOBULIN SER CALC-MCNC: 2.6 G/DL (ref 1.5–4.5)
GLUCOSE SERPL-MCNC: 100 MG/DL (ref 65–99)
HBA1C MFR BLD: 6.7 % (ref 4.8–5.6)
POTASSIUM SERPL-SCNC: 3.7 MMOL/L (ref 3.5–5.2)
PROT SERPL-MCNC: 6.7 G/DL (ref 6–8.5)
SODIUM SERPL-SCNC: 143 MMOL/L (ref 134–144)

## 2018-05-24 RX ORDER — VALSARTAN AND HYDROCHLOROTHIAZIDE 320; 25 MG/1; MG/1
TABLET, FILM COATED ORAL
Qty: 90 TAB | Refills: 1 | Status: SHIPPED | OUTPATIENT
Start: 2018-05-24 | End: 2018-11-23 | Stop reason: SDUPTHER

## 2018-06-11 ENCOUNTER — HOSPITAL ENCOUNTER (OUTPATIENT)
Dept: MAMMOGRAPHY | Age: 73
Discharge: HOME | End: 2018-06-11
Attending: INTERNAL MEDICINE
Payer: MEDICARE

## 2018-06-11 DIAGNOSIS — Z12.39 BREAST CANCER SCREENING: ICD-10-CM

## 2018-06-11 DIAGNOSIS — M94.9 DISORDER OF BONE AND CARTILAGE: ICD-10-CM

## 2018-06-11 DIAGNOSIS — M89.9 DISORDER OF BONE AND CARTILAGE: ICD-10-CM

## 2018-06-11 PROCEDURE — 77080 DXA BONE DENSITY AXIAL: CPT

## 2018-06-11 PROCEDURE — 77067 SCR MAMMO BI INCL CAD: CPT

## 2018-06-25 NOTE — TELEPHONE ENCOUNTER
Fax request received 90 day supply   Request for eliquis 5 mg, BID. Last office visit 5/17/18,   Next office visit 11/19/18.      Refills per verbal order from Dr. Colette Quintero.

## 2018-10-24 RX ORDER — ATORVASTATIN CALCIUM 10 MG/1
TABLET, FILM COATED ORAL
Qty: 90 TAB | Refills: 1 | Status: SHIPPED | OUTPATIENT
Start: 2018-10-24 | End: 2019-05-22 | Stop reason: SDUPTHER

## 2018-10-26 ENCOUNTER — OFFICE VISIT (OUTPATIENT)
Dept: FAMILY MEDICINE CLINIC | Age: 73
End: 2018-10-26

## 2018-10-26 ENCOUNTER — HOSPITAL ENCOUNTER (OUTPATIENT)
Dept: GENERAL RADIOLOGY | Age: 73
Discharge: HOME | End: 2018-10-26
Payer: MEDICARE

## 2018-10-26 VITALS
HEART RATE: 69 BPM | SYSTOLIC BLOOD PRESSURE: 129 MMHG | OXYGEN SATURATION: 97 % | RESPIRATION RATE: 18 BRPM | TEMPERATURE: 97.6 F | BODY MASS INDEX: 34.08 KG/M2 | WEIGHT: 185.2 LBS | HEIGHT: 62 IN | DIASTOLIC BLOOD PRESSURE: 82 MMHG

## 2018-10-26 DIAGNOSIS — R68.89 FLU-LIKE SYMPTOMS: ICD-10-CM

## 2018-10-26 DIAGNOSIS — J40 BRONCHITIS: ICD-10-CM

## 2018-10-26 DIAGNOSIS — J44.1 CHRONIC OBSTRUCTIVE PULMONARY DISEASE WITH ACUTE EXACERBATION (HCC): ICD-10-CM

## 2018-10-26 DIAGNOSIS — J40 BRONCHITIS: Primary | ICD-10-CM

## 2018-10-26 LAB
QUICKVUE INFLUENZA TEST: NEGATIVE
VALID INTERNAL CONTROL?: YES

## 2018-10-26 PROCEDURE — 71046 X-RAY EXAM CHEST 2 VIEWS: CPT

## 2018-10-26 RX ORDER — DOXYCYCLINE 100 MG/1
100 CAPSULE ORAL 2 TIMES DAILY
Qty: 20 CAP | Refills: 0 | Status: SHIPPED | OUTPATIENT
Start: 2018-10-26 | End: 2018-11-05

## 2018-10-26 RX ORDER — ALBUTEROL SULFATE 90 UG/1
1 AEROSOL, METERED RESPIRATORY (INHALATION)
Qty: 1 INHALER | Refills: 1 | Status: SHIPPED | OUTPATIENT
Start: 2018-10-26 | End: 2021-05-11 | Stop reason: SDUPTHER

## 2018-10-26 NOTE — PROGRESS NOTES
Subjective:   Nathan Dacosta is a 74 y.o. female who complains of not feeling well for 6 days, stable since that time. Pt got her flu shot 6 days ago and states she got the flu 2 hours later. Since then has just not felt well. Chest feels tight and is coughing up tan phlegm. Denies any fevers or chills. Mild SOB but no wheezing. Has not tried using her alb inh but has one in her purse. Using Advair and spiriva. Treatment to date none. Eating/drinking well. Evaluation to date: none. Treatment to date: none. Patient does not smoke cigarettes. Relevant PMH:   Past Medical History:   Diagnosis Date    Apical variant hypertrophic cardiomyopathy ([facility] [address]     Chronic diastolic heart failure (HCC)     HTN (hypertension)     Hx of mammogram     LVH (left ventricular hypertrophy) due to hypertensive disease     Mild pulmonary hypertension ([facility] [address] 9/13/2012    Obstructive sleep apnea (adult) (pediatric) 07-    AHI: 5.0 per hour    Pneumonia 2013 admit [facility]     Past Surgical History:   Procedure Laterality Date    HX HYSTERECTOMY      oophorectomy? Allergies   Allergen Reactions    Cetirizine Hives     Other reaction(s): Hives    Amlodipine Swelling     lower extremity swelling    Coreg [Carvedilol] Other (comments)     sweating    Erythromycin Hives    Verapamil Other (comments)     Patient unable to take verapamil for rate control secondary to peripheral edema/maliase/feeling of being high. Review of Systems  Pertinent items are noted in HPI.     Objective:     Visit Vitals  /90   Pulse 69   Temp 97.6 °F (36.4 °C) (Oral)   Resp 18   Ht 5' 2.15\" (1.579 m)   Wt 185 lb 3.2 oz (84 kg)   SpO2 97%   BMI 33.71 kg/m²     General:  alert, cooperative, no distress   Eyes: negative   Ears: normal TM's and external ear canals AU   Sinuses: Normal paranasal sinuses without tenderness   Mouth:  Lips, mucosa, and tongue normal. Teeth and gums normal and normal findings: oropharynx pink & moist without lesions or evidence of thrush   Neck: supple, symmetrical, trachea midline and no adenopathy. Heart: irregularly irregular rhythm with rate 70's. Lungs: Few faint wheezes but clear otherwise. No acute distress. Results for orders placed or performed in visit on 10/26/18   AMB POC RAPID INFLUENZA TEST   Result Value Ref Range    VALID INTERNAL CONTROL POC Yes     QuickVue Influenza test Negative Negative       Assessment/Plan:       ICD-10-CM ICD-9-CM    1. Bronchitis J40 490 XR CHEST PA LAT   2. Flu-like symptoms R68.89 780.99 AMB POC RAPID INFLUENZA TEST   3. Chronic obstructive pulmonary disease with acute exacerbation (HCC) J44.1 491.21 XR CHEST PA LAT      albuterol (PROVENTIL HFA, VENTOLIN HFA, PROAIR HFA) 90 mcg/actuation inhaler     Orders Placed This Encounter    XR CHEST PA LAT    AMB POC RAPID INFLUENZA TEST    albuterol (PROVENTIL HFA, VENTOLIN HFA, PROAIR HFA) 90 mcg/actuation inhaler    doxycycline (MONODOX) 100 mg capsule     Pt's alb inh had , new inh sent to her pharmacy. Recommend alb inh or neb tx 3-4 times daily. Add mucinex, increase fluids. Suggested symptomatic OTC remedies. Antibiotics per orders. RTC prn. Follow-up with PCP, Dr. Jessica Ocampo if any persistent or worsening symptoms. John Vega NP  This note will not be viewable in [address]

## 2018-10-26 NOTE — PROGRESS NOTES
Please inform pt that there is no pneumonia on xray. Cardiomegaly is not a new finding. Recommend treatment as discussed in office today. Thanks.

## 2018-10-26 NOTE — PATIENT INSTRUCTIONS
Bronchitis: Care Instructions  Your Care Instructions    Bronchitis is inflammation of the bronchial tubes, which carry air to the lungs. The tubes swell and produce mucus, or phlegm. The mucus and inflamed bronchial tubes make you cough. You may have trouble breathing. Most cases of bronchitis are caused by viruses like those that cause colds. Antibiotics usually do not help and they may be harmful. Bronchitis usually develops rapidly and lasts about 2 to 3 weeks in otherwise healthy people. Follow-up care is a key part of your treatment and safety. Be sure to make and go to all appointments, and call your doctor if you are having problems. It's also a good idea to know your test results and keep a list of the medicines you take. How can you care for yourself at home? · Take all medicines exactly as prescribed. Call your doctor if you think you are having a problem with your medicine. · Get some extra rest.  · Take an over-the-counter pain medicine, such as acetaminophen (Tylenol), ibuprofen (Advil, Motrin), or naproxen (Aleve) to reduce fever and relieve body aches. Read and follow all instructions on the label. · Do not take two or more pain medicines at the same time unless the doctor told you to. Many pain medicines have acetaminophen, which is Tylenol. Too much acetaminophen (Tylenol) can be harmful. · Take an over-the-counter cough medicine that contains dextromethorphan to help quiet a dry, hacking cough so that you can sleep. Avoid cough medicines that have more than one active ingredient. Read and follow all instructions on the label. · Breathe moist air from a humidifier, hot shower, or sink filled with hot water. The heat and moisture will thin mucus so you can cough it out. · Do not smoke. Smoking can make bronchitis worse. If you need help quitting, talk to your doctor about stop-smoking programs and medicines. These can increase your chances of quitting for good.   When should you call for help? Call 911 anytime you think you may need emergency care. For example, call if:    · You have severe trouble breathing.    Call your doctor now or seek immediate medical care if:    · You have new or worse trouble breathing.     · You cough up dark brown or bloody mucus (sputum).     · You have a new or higher fever.     · You have a new rash.    Watch closely for changes in your health, and be sure to contact your doctor if:    · You cough more deeply or more often, especially if you notice more mucus or a change in the color of your mucus.     · You are not getting better as expected. Where can you learn more? Go to http://brandy-giorgio.info/. Enter H333 in the search box to learn more about \"Bronchitis: Care Instructions. \"  Current as of: December 6, 2017  Content Version: 11.8  © 1477-0154 [employer]. Care instructions adapted under license by [employer] (which disclaims liability or warranty for this information). If you have questions about a medical condition or this instruction, always ask your healthcare professional. [address] any warranty or liability for your use of this information.

## 2018-10-26 NOTE — PROGRESS NOTES
Chief Complaint   Patient presents with    Cold Symptoms     Pt c/o cough (mucus tan), generalized body aches and nasal congestion. Pt has taken Vicks cold with minimal relief   Pt  states symptoms started about 1 week ago after receiving flu vaccine.

## 2018-10-29 ENCOUNTER — TELEPHONE (OUTPATIENT)
Dept: INTERNAL MEDICINE CLINIC | Age: 73
End: 2018-10-29

## 2018-10-29 NOTE — TELEPHONE ENCOUNTER
The patient would like you to read the dr notes from [place] she went to [facility] .  Would you like her stay on the meds they gave her or come on to see you

## 2018-10-29 NOTE — TELEPHONE ENCOUNTER
Note reviewed. Would continue w/ therapy outlined at that visit. Should f/u with me on Thurs/Fri if not improving.

## 2018-10-29 NOTE — TELEPHONE ENCOUNTER
Verified patient identity with two identifiers. Spoke with patient by phone. Gave patient Dr. Mahamed Pickett' recommendations. Patient verbalized understanding.

## 2018-11-19 ENCOUNTER — OFFICE VISIT (OUTPATIENT)
Dept: CARDIOLOGY CLINIC | Age: 73
End: 2018-11-19

## 2018-11-19 VITALS
DIASTOLIC BLOOD PRESSURE: 88 MMHG | BODY MASS INDEX: 34.23 KG/M2 | HEIGHT: 62 IN | SYSTOLIC BLOOD PRESSURE: 148 MMHG | WEIGHT: 186 LBS | HEART RATE: 106 BPM

## 2018-11-19 DIAGNOSIS — I48.20 CHRONIC ATRIAL FIBRILLATION (HCC): Primary | ICD-10-CM

## 2018-11-19 DIAGNOSIS — E78.00 PURE HYPERCHOLESTEROLEMIA: ICD-10-CM

## 2018-11-19 DIAGNOSIS — I05.9 MITRAL VALVE DISORDER: ICD-10-CM

## 2018-11-19 DIAGNOSIS — I10 ESSENTIAL HYPERTENSION: ICD-10-CM

## 2018-11-19 DIAGNOSIS — I42.2 APICAL VARIANT HYPERTROPHIC CARDIOMYOPATHY (HCC): ICD-10-CM

## 2018-11-19 DIAGNOSIS — I50.32 CHRONIC DIASTOLIC HEART FAILURE (HCC): ICD-10-CM

## 2018-11-19 DIAGNOSIS — J41.0 SIMPLE CHRONIC BRONCHITIS (HCC): ICD-10-CM

## 2018-11-19 RX ORDER — ATENOLOL 25 MG/1
25 TABLET ORAL DAILY
Qty: 90 TAB | Refills: 1 | Status: SHIPPED | OUTPATIENT
Start: 2018-11-19 | End: 2019-02-18 | Stop reason: SDUPTHER

## 2018-11-19 RX ORDER — FUROSEMIDE 20 MG/1
20 TABLET ORAL AS NEEDED
COMMUNITY
End: 2018-11-26 | Stop reason: SDUPTHER

## 2018-11-19 NOTE — PATIENT INSTRUCTIONS
Please start Atenolol 25mg daily. Please schedule 6 month follow up. If HR remains elevated, call office and see Dr. Demarco Presser for one month.

## 2018-11-19 NOTE — PROGRESS NOTES
Aidan Chavez     1945       Dejon Wells MD, [facility]  Date of Visit-11/19/2018   PCP is Porter Velasco MD   [address] [facility] [place]  [facility]  HPI:  Aidan Chavez is a 74 y.o. 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 Went to get flu shot and felt sick 2 hours later with soreness in arm  After 4-5 days still felt nausea, heavy in left shoulder  No fever, BRBPR, chest pain  For the heaviness went to wellness clinic 10/26-Looks like she had bronchitis flare up- got Mucinex, Doxycycline  Feeling better  EKG: atrial fibrillation at 106 with LVH with strain    She feels nervous today but no chest pain Denies new edema   Feels intermittent heart palpitations  &  shortness of breath   HR is >100 on EKG and listening to her  Assessment/Plan:     1. Chronic atrial fibrillation (HCC)  --RVR now, will add low dose of beta blocker , atenolol 25 mg /daily  -amiodarone did not work   --ow dose digoxin, did not tolerate diltiazem or Coreg  -continue oral anticoagulant to prevent stroke with the Eliquis    2. Apical variant hypertrophic cardiomyopathy ([address]  -as seen on previous MRI and echo   -started Coreg but got fatigue  -EF was 86% on MRI previously  -there was no BUSTER and no scar     3. Chronic diastolic heart failure ([address]  -compensated on Lasix as needed    4. Essential hypertension elevated as she is anxious  BP Readings from Last 3 Encounters:   11/19/18 148/88   10/26/18 129/82   05/23/18 112/84      5. Simple chronic bronchitis (HCC)-improving, has COPD and prone to resp issues  6. Mitral valve disorder-stable  7.  Pure hypercholesterolemia  Lab Results   Component Value Date/Time    LDL, calculated 49 11/22/2017 09:25 AM       Future Appointments   Date Time Provider Franchesca Godinez   11/26/2018  1:15 PM Porter Velasco MD [id number] [facility]   5/20/2019  9:40 AM Mirtha De Jesus MD [facility] Cristine Bowen      Patient Instructions   Please start Atenolol 25mg daily. Please schedule 6 month follow up. If HR remains elevated, call office and see Dr. Princess Blizzard for one month. Key CAD CHF Meds             furosemide (LASIX) 20 mg tablet  (Taking) Take 20 mg by mouth as needed. atorvastatin (LIPITOR) 10 mg tablet  (Taking) TAKE 1 TABLET BY MOUTH EVERY DAY    digoxin (LANOXIN) 0.125 mg tablet  (Taking) TAKE 1 TABLET BY MOUTH EVERY DAY    apixaban (ELIQUIS) 5 mg tablet  (Taking) TAKE 1 TAB BY MOUTH TWO (2) TIMES A DAY. valsartan-hydroCHLOROthiazide (DIOVAN-HCT) 320-25 mg per tablet  (Taking) TAKE 1 TABLET BY MOUTH EVERY DAY    aspirin 81 mg tablet  (Taking) Take 1 Tab by mouth daily. furosemide (LASIX) 20 mg tablet 1 tablet daily            Impression:   1. Chronic atrial fibrillation (HCC)    2. Apical variant hypertrophic cardiomyopathy ([address]    3. Chronic diastolic heart failure ([address]    4. Essential hypertension    5. Simple chronic bronchitis ([address]    6. Mitral valve disorder    7. Pure hypercholesterolemia       Cardiac History:   Chronic atrial fibrillation   Diastolic CHF    ECHO 2-57-10= ef 55-60%, biatrial enlargement  ECHO 3- EF 70%, AK of apex with apical hypertrophy, mild TR,LAE, Pulm Htn    Admit Jan 2012 with asthma and May 2013; Chronic airway dz    Cardiac MRI     1. Apical hypertrophic cardiomyopathy. The apical septal wall measures 20   mm. The apical lateral and mid inferolateral wall measures 26 mm. Complete   obliteration of the LV apical cavity. [place] like shape of the LV cavity   typical of the apical hypertrophic cardiomyopathy. Hyperdynamic left   ventricular systolic function with end-systolic cavity obliteration. 3-D   LVEF 86%. There is no systolic anterior motion of the mitral apparatus or no   LV outflow tract obstruction. 2. Normal right ventricular size and systolic function. RVEF 60%. 3. Mild 1+ mitral regurgitation.   4. Moderate 2+ tricuspid regurgitation. 5. Normal rest myocardial perfusion on first pass perfusion imaging. 6. On EGE and LGE imaging, there is no areas myocardial enhancement to   suggest any myocardial scar, recent or old myocardial infarction,   infiltration or inflammation. There is no features of myocardial sarcoidosis   or amyloidosis. There is no features of inflammatory cardiomyopathy such as   myocarditis. 7. Normal pleura and pericardium. Trace anterior and posterior pericardial   effusion. 8. Markedly dilated RA and LA. ROS-except as noted above. . A complete cardiac and respiratory are reviewed and negative except as above ; Resp-denies wheezing  or productive cough,. Const- No unusual weight loss or fever; Neuro-no recent seizure or CVA ; GI- No BRBPR, abdom pain, bloating ; - no  hematuria   see supplement sheet, initialed and to be scanned by staff  Past Medical History:   Diagnosis Date    Apical variant hypertrophic cardiomyopathy ([address]     Chronic diastolic heart failure ([address]     HTN (hypertension)     Hx of mammogram     LVH (left ventricular hypertrophy) due to hypertensive disease     Mild pulmonary hypertension ([facility] 9/13/2012    Obstructive sleep apnea (adult) (pediatric) 07-    AHI: 5.0 per hour    [facility] reports that she quit smoking about 22 years ago. She has a 2.00 pack-year smoking history. she has never used smokeless tobacco. She reports that she does not drink alcohol or use drugs. Exam and Labs:    Visit Vitals  /88   Pulse (!) 106   Ht 5' 2\" (1.575 m)   Wt 186 lb (84.4 kg)   BMI 34.02 kg/m²    @Constitutional:  NAD, comfortable  Head: NC,AT. Eyes: No scleral icterus. Neck:  Neck supple. No JVD present. Throat: moist mucous membranes. Chest: Effort normal & normal respiratory excursion . Neurological: alert, conversant and oriented . Skin: Skin is not cold. No obvious systemic rash noted. Not diaphoretic. No erythema.  Psychiatric: Grossly normal mood and affect. Behavior appears normal. Extremities:  no clubbing or cyanosis. Abdomen: non distended    Lungs:breath sounds normal. No stridor. distress, wheezes or  Rales. Heart:    no murmurs noted, no gallops noted, irregularly irregular rhythm with rate rapid, no JVD , PMI non displaced. Edema: Edema is none. Lab Results   Component Value Date/Time    Cholesterol, total 101 11/22/2017 09:25 AM    HDL Cholesterol 35 (L) 11/22/2017 09:25 AM    LDL, calculated 49 11/22/2017 09:25 AM    Triglyceride 83 11/22/2017 09:25 AM    CHOL/HDL Ratio 5.1 (H) 05/12/2013 04:20 AM     Lab Results   Component Value Date/Time    Sodium 143 05/23/2018 02:06 PM    Potassium 3.7 05/23/2018 02:06 PM    Chloride 100 05/23/2018 02:06 PM    CO2 29 05/23/2018 02:06 PM    Anion gap 6 05/15/2013 07:10 AM    Glucose 100 (H) 05/23/2018 02:06 PM    BUN 10 05/23/2018 02:06 PM    Creatinine 0.68 05/23/2018 02:06 PM    BUN/Creatinine ratio 15 05/23/2018 02:06 PM    GFR est  05/23/2018 02:06 PM    GFR est non-AA 86 05/23/2018 02:06 PM    Calcium 9.0 05/23/2018 02:06 PM      Wt Readings from Last 3 Encounters:   11/19/18 186 lb (84.4 kg)   10/26/18 185 lb 3.2 oz (84 kg)   05/23/18 190 lb (86.2 kg)      BP Readings from Last 3 Encounters:   11/19/18 148/88   10/26/18 129/82   05/23/18 112/84      Current Outpatient Medications   Medication Sig    furosemide (LASIX) 20 mg tablet Take 20 mg by mouth as needed.  albuterol (PROVENTIL HFA, VENTOLIN HFA, PROAIR HFA) 90 mcg/actuation inhaler Take 1 Puff by inhalation every six (6) hours as needed for Wheezing.  atorvastatin (LIPITOR) 10 mg tablet TAKE 1 TABLET BY MOUTH EVERY DAY    digoxin (LANOXIN) 0.125 mg tablet TAKE 1 TABLET BY MOUTH EVERY DAY    apixaban (ELIQUIS) 5 mg tablet TAKE 1 TAB BY MOUTH TWO (2) TIMES A DAY.  fluticasone-salmeterol (ADVAIR DISKUS) 250-50 mcg/dose diskus inhaler INHALE 1 PUFF BY MOUTH TWICE A DAY.     valsartan-hydroCHLOROthiazide (DIOVAN-HCT) 320-25 mg per tablet TAKE 1 TABLET BY MOUTH EVERY DAY    FLUTICASONE PROPIONATE (FLONASE NA) by Nasal route as needed.  fexofenadine (ALLEGRA) 180 mg tablet Take 180 mg by mouth daily as needed for Allergies.  triamcinolone acetonide (KENALOG) 0.1 % topical cream APPLY A THIN LAYER TO AFFECTED AREA(S) AS DIRECTED TWICE A DAY AS NEEDED    albuterol-ipratropium (DUO-NEB) 2.5 mg-0.5 mg/3 ml nebu 3 mL by Nebulization route four (4) times daily. (Patient taking differently: 3 mL by Nebulization route as needed.)    tiotropium (SPIRIVA) 18 mcg inhalation capsule Take 1 Cap by inhalation daily. (Patient taking differently: Take 1 Cap by inhalation as needed.)    aspirin 81 mg tablet Take 1 Tab by mouth daily.  cpap machine kit by Does Not Apply route nightly.  multivitamin (ONE A DAY) tablet Take 1 Tab by mouth daily.  varicella-zoster recombinant, PF, (SHINGRIX, PF,) 50 mcg/0.5 mL susr injection 0.5 ml IM once and then repeat in 2-6 months.  furosemide (LASIX) 20 mg tablet 1 tablet daily (Patient taking differently: 20 mg as needed. 1 tablet daily)     No current facility-administered medications for this visit. Impression see above.

## 2018-11-23 DIAGNOSIS — E11.9 CONTROLLED TYPE 2 DIABETES MELLITUS WITHOUT COMPLICATION, WITHOUT LONG-TERM CURRENT USE OF INSULIN (HCC): ICD-10-CM

## 2018-11-23 DIAGNOSIS — I10 ESSENTIAL HYPERTENSION: ICD-10-CM

## 2018-11-23 RX ORDER — VALSARTAN AND HYDROCHLOROTHIAZIDE 320; 25 MG/1; MG/1
TABLET, FILM COATED ORAL
Qty: 90 TAB | Refills: 1 | Status: SHIPPED | OUTPATIENT
Start: 2018-11-23 | End: 2019-05-24 | Stop reason: SDUPTHER

## 2018-11-23 NOTE — TELEPHONE ENCOUNTER
Call to pharmacy to confirm her Valsartan is not one of the recalled lots. It is not and they have in stock. Patient has appointment scheduled with Dr. Hipolito Mendoza for 11/26/18. Verbal order per Dr. Hipolito Mendoza for refill.

## 2018-11-26 ENCOUNTER — OFFICE VISIT (OUTPATIENT)
Dept: INTERNAL MEDICINE CLINIC | Age: 73
End: 2018-11-26

## 2018-11-26 VITALS
DIASTOLIC BLOOD PRESSURE: 76 MMHG | HEIGHT: 62 IN | WEIGHT: 184 LBS | SYSTOLIC BLOOD PRESSURE: 122 MMHG | TEMPERATURE: 97.7 F | BODY MASS INDEX: 33.86 KG/M2 | HEART RATE: 76 BPM | OXYGEN SATURATION: 96 % | RESPIRATION RATE: 18 BRPM

## 2018-11-26 DIAGNOSIS — J41.0 SIMPLE CHRONIC BRONCHITIS (HCC): ICD-10-CM

## 2018-11-26 DIAGNOSIS — Z23 ENCOUNTER FOR IMMUNIZATION: ICD-10-CM

## 2018-11-26 DIAGNOSIS — E78.00 PURE HYPERCHOLESTEROLEMIA: ICD-10-CM

## 2018-11-26 DIAGNOSIS — E11.21 TYPE 2 DIABETES WITH NEPHROPATHY (HCC): Primary | ICD-10-CM

## 2018-11-26 DIAGNOSIS — E66.9 OBESITY (BMI 30.0-34.9): ICD-10-CM

## 2018-11-26 DIAGNOSIS — I10 ESSENTIAL HYPERTENSION: ICD-10-CM

## 2018-11-26 NOTE — PROGRESS NOTES
Follow up. Sofie Gilmore is a 74 y.o. female  who present for routine immunizations. she  denies any symptoms , reactions or allergies that would exclude them from being immunized today. Risks and adverse reactions were discussed and the VIS was given to them. All questions were addressed. she was observed for 5 min post injection. There were no reactions observed.     Charlene Smith RN

## 2018-11-26 NOTE — PROGRESS NOTES
Juan Carlos Thakur is a 74 y.o. female who was seen in clinic today (11/26/2018). Assessment & Plan:   Diagnoses and all orders for this visit:    1. Type 2 diabetes with nephropathy ([facility]- well controlled, home glucose monitoring emphasized, reviewed following up with specialists and/or referrals placed below and continue current plan pending review of labs. -     METABOLIC PANEL, COMPREHENSIVE  -     HEMOGLOBIN A1C WITH EAG  -     LIPID PANEL  -     MICROALBUMIN, UR, RAND W/ MICROALB/CREAT RATIO  -      DIABETES FOOT EXAM  -     CBC W/O DIFF    2. Simple chronic bronchitis ([facility]- back to baseline, no changes in medications    3. Essential hypertension- well controlled, continue current treatment pending review of labs   -     METABOLIC PANEL, COMPREHENSIVE    4. Pure hypercholesterolemia- at goal, continue current treatment pending review of labs    -     METABOLIC PANEL, COMPREHENSIVE  -     LIPID PANEL    5. Obesity (BMI 30.0-34.9)- stable, needs improvement, I have reviewed/discussed the above normal BMI with the patient. I have recommended the following interventions: encourage exercise and lifestyle education regarding diet. 6. Encounter for immunization  -     PNEUMOCOCCAL POLYSACCHARIDE VACCINE, 23-VALENT, ADULT OR IMMUNOSUPPRESSED PT DOSE,  -     ADMIN PNEUMOCOCCAL VACCINE         Follow-up Disposition:  Return in about 6 months (around 5/26/2019) for FULL PHYSICAL - 30 minutes. Subjective:   Yani Centeno was seen today for Diabetes; Hypertension; Cholesterol Problem; and COPD    Endocrine Review  She is seen for diabetes and obesity. Since last visit weight has decreased.  She reports medication compliance: n/a - patient not on medications (diet controlled).  Diabetic diet compliance: compliant all of the time. Lab review: labs reviewed and discussed with patient.  Eye exam: overdue.      Cardiovascular Review  The patient has hypertension and hyperlipidemia.  Since last visit her weight has decreased.  She has seen cardiology and medications have been adjusted (metroprolol added due to elevated HR). She reports taking medications as instructed  Home BP monitoring is not done regularly. Diet and Lifestyle: generally follows a low fat low cholesterol diet, generally follows a low sodium diet, she is walking regularly. Lab review: labs reviewed and discussed with patient.      Pulmonary Review  The patient is being seen for COPD.  She was seen last month at [facility] for bronchitis, this has finally started to resolved. Current limitations in activity: none, was an issue when she was sick. Coughing when present is described as none. Wheezing is not present. Regimen compliance: using as directed. She has been using albuterol 2-3 times per day, but she is off this now.         Brief Labs:     Lab Results   Component Value Date/Time    Sodium 143 05/23/2018 02:06 PM    Potassium 3.7 05/23/2018 02:06 PM    Creatinine 0.68 05/23/2018 02:06 PM    Cholesterol, total 101 11/22/2017 09:25 AM    HDL Cholesterol 35 11/22/2017 09:25 AM    LDL, calculated 49 11/22/2017 09:25 AM    Triglyceride 83 11/22/2017 09:25 AM    Hemoglobin A1c 6.7 05/23/2018 02:06 PM          Prior to Admission medications    Medication Sig Start Date End Date Taking? Authorizing Provider   valsartan-hydroCHLOROthiazide (DIOVAN-HCT) 320-25 mg per tablet TAKE 1 TABLET BY MOUTH EVERY DAY 11/23/18  Yes Parveen Ceja MD   atenolol (TENORMIN) 25 mg tablet Take 1 Tab by mouth daily. 11/19/18  Yes Sarah Ortiz MD   albuterol (PROVENTIL HFA, VENTOLIN HFA, PROAIR HFA) 90 mcg/actuation inhaler Take 1 Puff by inhalation every six (6) hours as needed for Wheezing.  10/26/18  Yes Amy Garcia NP   atorvastatin (LIPITOR) 10 mg tablet TAKE 1 TABLET BY MOUTH EVERY DAY 10/24/18  Yes Parveen Ceja MD   digoxin (LANOXIN) 0.125 mg tablet TAKE 1 TABLET BY MOUTH EVERY DAY 9/4/18  Yes Sarah Ortiz MD   apixaban (ELIQUIS) 5 mg tablet TAKE 1 TAB BY MOUTH TWO (2) TIMES A DAY. 9/4/18  Yes Sandrita Martinez MD   fluticasone-salmeterol (ADVAIR DISKUS) 250-50 mcg/dose diskus inhaler INHALE 1 PUFF BY MOUTH TWICE A DAY. 6/22/18  Yes Tj Baker MD   FLUTICASONE PROPIONATE (FLONASE NA) by Nasal route as needed. Yes Provider, Historical   fexofenadine (ALLEGRA) 180 mg tablet Take 180 mg by mouth daily as needed for Allergies. Yes Provider, Historical   triamcinolone acetonide (KENALOG) 0.1 % topical cream APPLY A THIN LAYER TO AFFECTED AREA(S) AS DIRECTED TWICE A DAY AS NEEDED 5/17/17  Yes Tj Baker MD   albuterol-ipratropium (DUO-NEB) 2.5 mg-0.5 mg/3 ml nebu 3 mL by Nebulization route four (4) times daily. Patient taking differently: 3 mL by Nebulization route as needed. 5/10/16  Yes Tj Baker MD   furosemide (LASIX) 20 mg tablet 1 tablet daily  Patient taking differently: 20 mg as needed. 1 tablet daily 7/14/14  Yes Tj Baker MD   tiotropium [facility]) 18 mcg inhalation capsule Take 1 Cap by inhalation daily. Patient taking differently: Take 1 Cap by inhalation as needed. 7/14/14  Yes Tj Baker MD   aspirin 81 mg tablet Take 1 Tab by mouth daily. 1/9/14  Yes Tj Baker MD   cpap machine kit by Does Not Apply route nightly. Yes Provider, Historical   multivitamin (ONE A DAY) tablet Take 1 Tab by mouth daily. Yes Provider, Historical          Allergies   Allergen Reactions    Cetirizine Hives     Other reaction(s): Hives    Amlodipine Swelling     lower extremity swelling    Coreg [Carvedilol] Other (comments)     sweating    Erythromycin Hives    Verapamil Other (comments)     Patient unable to take verapamil for rate control secondary to peripheral edema/maliase/feeling of being high. Review of Systems   Constitutional: Negative for malaise/fatigue and weight loss. Respiratory: Negative for cough and shortness of breath.     Cardiovascular: Negative for chest pain, palpitations and leg swelling. Gastrointestinal: Negative for abdominal pain, constipation, diarrhea, heartburn, nausea and vomiting. Genitourinary: Negative for frequency. Musculoskeletal: Negative for joint pain and myalgias. Skin: Negative for rash. Neurological: Negative for tingling, sensory change, focal weakness and headaches. Psychiatric/Behavioral: Negative for depression. The patient is not nervous/anxious and does not have insomnia. Objective:   Physical Exam   Constitutional: She appears well-developed. No distress. obese   HENT:   Mouth/Throat: Mucous membranes are normal.   Eyes: Conjunctivae and lids are normal. No scleral icterus. Neck: Neck supple. No thyromegaly present. Cardiovascular: Normal heart sounds. An irregularly irregular rhythm present. No murmur heard. Pulmonary/Chest: Effort normal and breath sounds normal. She has no wheezes. She has no rales. Abdominal: Soft. Bowel sounds are normal. She exhibits no mass. There is no hepatosplenomegaly. There is no tenderness. A hernia (umbilical - 7cm, easily reducible, L sided) is present. Musculoskeletal: She exhibits no edema. Lymphadenopathy:     She has no cervical adenopathy. Neurological:   Left Foot:   Visual Exam: normal    Pulse DP: 2+ (normal)   Filament test: normal sensation   Right Foot:   Visual Exam: 1st toe- hypertrophied nail   Pulse DP: 2+ (normal)   Filament test: normal sensation     Skin: No rash noted. Psychiatric: She has a normal mood and affect. Her behavior is normal.         Visit Vitals  /76   Pulse 76   Temp 97.7 °F (36.5 °C) (Oral)   Resp 18   Ht 5' 2\" (1.575 m)   Wt 184 lb (83.5 kg)   SpO2 96%   BMI 33.65 kg/m²         Disclaimer:  Advised her to call back or return to office if symptoms worsen/change/persist.  Discussed expected course/resolution/complications of diagnosis in detail with patient.     Medication risks/benefits/costs/interactions/alternatives discussed with patient. She was given an after visit summary which includes diagnoses, current medications, & vitals. She expressed understanding with the diagnosis and plan. Aspects of this note may have been generated using voice recognition software. Despite editing, there may be some syntax errors.        Jose Armando Lu MD

## 2018-11-26 NOTE — PATIENT INSTRUCTIONS

## 2018-11-27 LAB
ALBUMIN SERPL-MCNC: 4.3 G/DL (ref 3.5–4.8)
ALBUMIN/CREAT UR: 151.4 MG/G CREAT (ref 0–30)
ALBUMIN/GLOB SERPL: 1.8 {RATIO} (ref 1.2–2.2)
ALP SERPL-CCNC: 98 IU/L (ref 39–117)
ALT SERPL-CCNC: 18 IU/L (ref 0–32)
AST SERPL-CCNC: 16 IU/L (ref 0–40)
BILIRUB SERPL-MCNC: 0.6 MG/DL (ref 0–1.2)
BUN SERPL-MCNC: 14 MG/DL (ref 8–27)
BUN/CREAT SERPL: 18 (ref 12–28)
CALCIUM SERPL-MCNC: 9.5 MG/DL (ref 8.7–10.3)
CHLORIDE SERPL-SCNC: 100 MMOL/L (ref 96–106)
CHOLEST SERPL-MCNC: 106 MG/DL (ref 100–199)
CO2 SERPL-SCNC: 29 MMOL/L (ref 20–29)
CREAT SERPL-MCNC: 0.77 MG/DL (ref 0.57–1)
CREAT UR-MCNC: 108.7 MG/DL
ERYTHROCYTE [DISTWIDTH] IN BLOOD BY AUTOMATED COUNT: 13.8 % (ref 12.3–15.4)
EST. AVERAGE GLUCOSE BLD GHB EST-MCNC: 143 MG/DL
GLOBULIN SER CALC-MCNC: 2.4 G/DL (ref 1.5–4.5)
GLUCOSE SERPL-MCNC: 94 MG/DL (ref 65–99)
HBA1C MFR BLD: 6.6 % (ref 4.8–5.6)
HCT VFR BLD AUTO: 38.4 % (ref 34–46.6)
HDLC SERPL-MCNC: 33 MG/DL
HGB BLD-MCNC: 12.5 G/DL (ref 11.1–15.9)
LDLC SERPL CALC-MCNC: 48 MG/DL (ref 0–99)
MCH RBC QN AUTO: 29.5 PG (ref 26.6–33)
MCHC RBC AUTO-ENTMCNC: 32.6 G/DL (ref 31.5–35.7)
MCV RBC AUTO: 91 FL (ref 79–97)
MICROALBUMIN UR-MCNC: 164.6 UG/ML
PLATELET # BLD AUTO: 279 X10E3/UL (ref 150–379)
POTASSIUM SERPL-SCNC: 3.8 MMOL/L (ref 3.5–5.2)
PROT SERPL-MCNC: 6.7 G/DL (ref 6–8.5)
RBC # BLD AUTO: 4.24 X10E6/UL (ref 3.77–5.28)
SODIUM SERPL-SCNC: 145 MMOL/L (ref 134–144)
TRIGL SERPL-MCNC: 127 MG/DL (ref 0–149)
VLDLC SERPL CALC-MCNC: 25 MG/DL (ref 5–40)
WBC # BLD AUTO: 8.4 X10E3/UL (ref 3.4–10.8)

## 2018-11-27 NOTE — PROGRESS NOTES
Letter sent to patient. All labs are stable or at goal except for worsening urine studies, on max dose ARB, will monitor.

## 2019-02-18 DIAGNOSIS — I10 ESSENTIAL HYPERTENSION: ICD-10-CM

## 2019-02-18 DIAGNOSIS — I48.20 CHRONIC ATRIAL FIBRILLATION (HCC): Primary | ICD-10-CM

## 2019-02-18 RX ORDER — ATENOLOL 25 MG/1
TABLET ORAL
Qty: 90 TAB | Refills: 3 | Status: SHIPPED | OUTPATIENT
Start: 2019-02-18 | End: 2020-05-08

## 2019-02-18 NOTE — TELEPHONE ENCOUNTER
Request for atenolol 25mg daily. Last office visit 11-19-18, next office visit 5-20-19.  Refills per verbal order from Dr. Lauralee Aschoff.

## 2019-02-25 RX ORDER — DIGOXIN 125 MCG
TABLET ORAL
Qty: 90 TAB | Refills: 1 | Status: SHIPPED | OUTPATIENT
Start: 2019-02-25 | End: 2019-08-25 | Stop reason: SDUPTHER

## 2019-02-25 NOTE — TELEPHONE ENCOUNTER
Requested Prescriptions     Signed Prescriptions Disp Refills    digoxin (LANOXIN) 0.125 mg tablet 90 Tab 1     Sig: TAKE 1 TABLET BY MOUTH EVERY DAY     Authorizing Provider: Kenzie Enriquez     Ordering User: Ju Lepe     Verbal order per Dr. Ann Vazquez.      6 month follow up scheduled on 5-20-19.

## 2019-05-20 ENCOUNTER — OFFICE VISIT (OUTPATIENT)
Dept: CARDIOLOGY CLINIC | Age: 74
End: 2019-05-20

## 2019-05-20 VITALS
OXYGEN SATURATION: 97 % | DIASTOLIC BLOOD PRESSURE: 70 MMHG | HEART RATE: 86 BPM | WEIGHT: 181 LBS | BODY MASS INDEX: 33.31 KG/M2 | SYSTOLIC BLOOD PRESSURE: 130 MMHG | RESPIRATION RATE: 16 BRPM | HEIGHT: 62 IN

## 2019-05-20 DIAGNOSIS — I42.2 APICAL VARIANT HYPERTROPHIC CARDIOMYOPATHY (HCC): ICD-10-CM

## 2019-05-20 DIAGNOSIS — I10 ESSENTIAL HYPERTENSION: ICD-10-CM

## 2019-05-20 DIAGNOSIS — I48.20 CHRONIC ATRIAL FIBRILLATION (HCC): Primary | ICD-10-CM

## 2019-05-20 DIAGNOSIS — E78.00 PURE HYPERCHOLESTEROLEMIA: ICD-10-CM

## 2019-05-20 DIAGNOSIS — J41.0 SIMPLE CHRONIC BRONCHITIS (HCC): ICD-10-CM

## 2019-05-20 DIAGNOSIS — I50.32 CHRONIC DIASTOLIC HEART FAILURE (HCC): ICD-10-CM

## 2019-05-20 DIAGNOSIS — I05.9 MITRAL VALVE DISORDER: ICD-10-CM

## 2019-05-20 NOTE — PROGRESS NOTES
Catherine Berg     1945       Dejon Berry MD, [facility]  Date of Visit-5/20/2019   PCP is Win Kate, [address] and [facility] [place]  [facility]  HPI:  Catherine Berg is a 74 y.o. female   Pt is coming in today for a f/u of chronic Afib and apical hypertrophic cardiomyopathy with diastolic dysfunction. Today Ms. Francine Acosta reports that she has been \"feeling okay. \" She has been able to stay generally active, doing the things she wants to do. Yesterday she did notice that she had some swelling in her right ankle. Since she was last seen, Ms. Francine Acosta did have one \"very, very brief\" episode of arrhythmia, but has otherwise felt well.  + heart palpitations  occasionally unchanged  + lower extremity edema  As above  Denies chest pain,  syncope or shortness of breath at rest, has no tachycardia    Assessment/Plan:     1. Chronic atrial fibrillation (HCC)  Rate controlled. Perceives rare palpitations. Continue with Aspirin and Eliquis. Failed amiodarone, diltiazem and Coreg. Continue atenolol and Digoxin. 2. Apical variant hypertrophic cardiomyopathy ([address]  -as seen on previous MRI and echo   -started Coreg but got fatigue  -EF was 86% on MRI previously  -there was no BUSTER and no scar     3. Chronic diastolic heart failure ([address]  -compensated on Lasix as needed    4. Essential hypertension  BP Readings from Last 3 Encounters:   05/20/19 130/70   11/26/18 122/76   11/19/18 148/88       5. Simple chronic bronchitis (HCC)  improving, has COPD and prone to resp issues    6. Mitral valve disorder  stable    7.  Pure hypercholesterolemia  Lab Results   Component Value Date/Time    LDL, calculated 48 11/26/2018 02:08 PM       F/U in 6 months with echocardiogram  Future Appointments   Date Time Provider Franchesca Godinez   9/24/2019 10:30 AM Win Kate MD [id number] [facility]   11/18/2019 10:00 AM [facility], [address]   11/25/2019  9:40 AM Fanta Dykes  [address]       Patient Instructions   You will be scheduled for an echocardiogram about one week prior to your 6 month follow up. Key CAD CHF Meds             digoxin (LANOXIN) 0.125 mg tablet (Taking) TAKE 1 TABLET BY MOUTH EVERY DAY    atenolol (TENORMIN) 25 mg tablet (Taking) TAKE 1 TABLET BY MOUTH EVERY DAY    valsartan-hydroCHLOROthiazide (DIOVAN-HCT) 320-25 mg per tablet (Taking/Discontinued) TAKE 1 TABLET BY MOUTH EVERY DAY    atorvastatin (LIPITOR) 10 mg tablet (Taking/Discontinued) TAKE 1 TABLET BY MOUTH EVERY DAY    apixaban (ELIQUIS) 5 mg tablet (Taking) TAKE 1 TAB BY MOUTH TWO (2) TIMES A DAY. furosemide (LASIX) 20 mg tablet (Taking) 1 tablet daily    aspirin 81 mg tablet (Taking) Take 1 Tab by mouth daily. Impression:   1. Chronic atrial fibrillation (HCC)    2. Apical variant hypertrophic cardiomyopathy ([address]    3. Chronic diastolic heart failure ([address]    4. Essential hypertension    5. Simple chronic bronchitis ([address]    6. Mitral valve disorder    7. Pure hypercholesterolemia       Cardiac History:   Chronic atrial fibrillation   Diastolic CHF    ECHO 1-12-82= ef 55-60%, biatrial enlargement  ECHO 3- EF 70%, AK of apex with apical hypertrophy, mild TR,LAE, Pulm Htn    Admit Jan 2012 with asthma and May 2013; Chronic airway dz    Cardiac MRI     1. Apical hypertrophic cardiomyopathy. The apical septal wall measures 20   mm. The apical lateral and mid inferolateral wall measures 26 mm. Complete   obliteration of the LV apical cavity. [place] like shape of the LV cavity   typical of the apical hypertrophic cardiomyopathy. Hyperdynamic left   ventricular systolic function with end-systolic cavity obliteration. 3-D   LVEF 86%. There is no systolic anterior motion of the mitral apparatus or no   LV outflow tract obstruction. 2. Normal right ventricular size and systolic function. RVEF 60%. 3. Mild 1+ mitral regurgitation.   4. Moderate 2+ tricuspid regurgitation. 5. Normal rest myocardial perfusion on first pass perfusion imaging. 6. On EGE and LGE imaging, there is no areas myocardial enhancement to   suggest any myocardial scar, recent or old myocardial infarction,   infiltration or inflammation. There is no features of myocardial sarcoidosis   or amyloidosis. There is no features of inflammatory cardiomyopathy such as   myocarditis. 7. Normal pleura and pericardium. Trace anterior and posterior pericardial   effusion. 8. Markedly dilated RA and LA. ROS-except as noted above. . A complete cardiac and respiratory are reviewed and negative except as above ; Resp-denies wheezing  or productive cough,. Const- No unusual weight loss or fever; Neuro-no recent seizure or CVA ; GI- No BRBPR, abdom pain, bloating ; - no  hematuria   see supplement sheet, initialed and to be scanned by staff  Past Medical History:   Diagnosis Date    Apical variant hypertrophic cardiomyopathy ([address]     Chronic diastolic heart failure ([address]     HTN (hypertension)     Hx of mammogram     LVH (left ventricular hypertrophy) due to hypertensive disease     Mild pulmonary hypertension ([facility] 9/13/2012    Obstructive sleep apnea (adult) (pediatric) 07-    AHI: 5.0 per hour    Pneumonia 2013 admit [facility] Hx= reports that she quit smoking about 23 years ago. She has a 2.00 pack-year smoking history. She has never used smokeless tobacco. She reports that she does not drink alcohol or use drugs. Exam and Labs:  /70 (BP 1 Location: Left arm, BP Patient Position: Sitting)   Pulse 86   Resp 16   Ht 5' 2\" (1.575 m)   Wt 181 lb (82.1 kg)   SpO2 97%   BMI 33.11 kg/m² Constitutional:  NAD, comfortable  Head: NC,AT. Eyes: No scleral icterus. Neck:  Neck supple. No JVD present. Throat: moist mucous membranes. Chest: Effort normal & normal respiratory excursion . Neurological: alert, conversant and oriented . Skin: Skin is not cold.  No obvious systemic rash noted. Not diaphoretic. No erythema. Psychiatric:  Grossly normal mood and affect. Behavior appears normal. Extremities:  no clubbing or cyanosis. Abdomen: non distended    Lungs:breath sounds normal. No stridor. distress, wheezes or  Rales. Heart: normal rate, regular rhythm, normal S1, S2, no murmurs, rubs, clicks or gallops , PMI non displaced. Edema: Edema is 1-2+ in the right ankle. None in the left ankle. Lab Results   Component Value Date/Time    Cholesterol, total 106 11/26/2018 02:08 PM    HDL Cholesterol 33 (L) 11/26/2018 02:08 PM    LDL, calculated 48 11/26/2018 02:08 PM    Triglyceride 127 11/26/2018 02:08 PM    CHOL/HDL Ratio 5.1 (H) 05/12/2013 04:20 AM     Lab Results   Component Value Date/Time    Sodium 145 (H) 11/26/2018 02:08 PM    Potassium 3.8 11/26/2018 02:08 PM    Chloride 100 11/26/2018 02:08 PM    CO2 29 11/26/2018 02:08 PM    Anion gap 6 05/15/2013 07:10 AM    Glucose 94 11/26/2018 02:08 PM    BUN 14 11/26/2018 02:08 PM    Creatinine 0.77 11/26/2018 02:08 PM    BUN/Creatinine ratio 18 11/26/2018 02:08 PM    GFR est AA 87 11/26/2018 02:08 PM    GFR est non-AA 76 11/26/2018 02:08 PM    Calcium 9.5 11/26/2018 02:08 PM      Wt Readings from Last 3 Encounters:   05/20/19 181 lb (82.1 kg)   11/26/18 184 lb (83.5 kg)   11/19/18 186 lb (84.4 kg)      BP Readings from Last 3 Encounters:   05/20/19 130/70   11/26/18 122/76   11/19/18 148/88      Current Outpatient Medications   Medication Sig    digoxin (LANOXIN) 0.125 mg tablet TAKE 1 TABLET BY MOUTH EVERY DAY    atenolol (TENORMIN) 25 mg tablet TAKE 1 TABLET BY MOUTH EVERY DAY    valsartan-hydroCHLOROthiazide (DIOVAN-HCT) 320-25 mg per tablet TAKE 1 TABLET BY MOUTH EVERY DAY    albuterol (PROVENTIL HFA, VENTOLIN HFA, PROAIR HFA) 90 mcg/actuation inhaler Take 1 Puff by inhalation every six (6) hours as needed for Wheezing.     atorvastatin (LIPITOR) 10 mg tablet TAKE 1 TABLET BY MOUTH EVERY DAY    apixaban (ELIQUIS) 5 mg tablet TAKE 1 TAB BY MOUTH TWO (2) TIMES A DAY.  fluticasone-salmeterol (ADVAIR DISKUS) 250-50 mcg/dose diskus inhaler INHALE 1 PUFF BY MOUTH TWICE A DAY.  FLUTICASONE PROPIONATE (FLONASE NA) by Nasal route as needed.  fexofenadine (ALLEGRA) 180 mg tablet Take 180 mg by mouth daily as needed for Allergies.  triamcinolone acetonide (KENALOG) 0.1 % topical cream APPLY A THIN LAYER TO AFFECTED AREA(S) AS DIRECTED TWICE A DAY AS NEEDED    albuterol-ipratropium (DUO-NEB) 2.5 mg-0.5 mg/3 ml nebu 3 mL by Nebulization route four (4) times daily. (Patient taking differently: 3 mL by Nebulization route as needed.)    furosemide (LASIX) 20 mg tablet 1 tablet daily (Patient taking differently: 20 mg as needed. 1 tablet daily)    tiotropium (SPIRIVA) 18 mcg inhalation capsule Take 1 Cap by inhalation daily. (Patient taking differently: Take 1 Cap by inhalation as needed.)    aspirin 81 mg tablet Take 1 Tab by mouth daily.  cpap machine kit by Does Not Apply route nightly.  multivitamin (ONE A DAY) tablet Take 1 Tab by mouth daily. No current facility-administered medications for this visit. Impression see above.       Written by Helene Venegas, as dictated by Marcial Saleh MD.

## 2019-05-23 RX ORDER — ATORVASTATIN CALCIUM 10 MG/1
TABLET, FILM COATED ORAL
Qty: 90 TAB | Refills: 1 | Status: SHIPPED | OUTPATIENT
Start: 2019-05-23 | End: 2019-12-08 | Stop reason: SDUPTHER

## 2019-05-24 DIAGNOSIS — E11.9 CONTROLLED TYPE 2 DIABETES MELLITUS WITHOUT COMPLICATION, WITHOUT LONG-TERM CURRENT USE OF INSULIN (HCC): ICD-10-CM

## 2019-05-24 DIAGNOSIS — I10 ESSENTIAL HYPERTENSION: ICD-10-CM

## 2019-05-24 RX ORDER — VALSARTAN AND HYDROCHLOROTHIAZIDE 320; 25 MG/1; MG/1
TABLET, FILM COATED ORAL
Qty: 90 TAB | Refills: 1 | Status: SHIPPED | OUTPATIENT
Start: 2019-05-24 | End: 2019-11-17 | Stop reason: SDUPTHER

## 2019-06-09 RX ORDER — FLUTICASONE PROPIONATE AND SALMETEROL 250; 50 UG/1; UG/1
POWDER RESPIRATORY (INHALATION)
Qty: 180 EACH | Refills: 0 | Status: SHIPPED | OUTPATIENT
Start: 2019-06-09 | End: 2020-03-12 | Stop reason: SDUPTHER

## 2019-08-25 DIAGNOSIS — I48.20 CHRONIC ATRIAL FIBRILLATION (HCC): Primary | ICD-10-CM

## 2019-08-27 RX ORDER — DIGOXIN 125 MCG
0.12 TABLET ORAL DAILY
Qty: 90 TAB | Refills: 3 | Status: SHIPPED | OUTPATIENT
Start: 2019-08-27 | End: 2020-08-17

## 2019-08-27 NOTE — TELEPHONE ENCOUNTER
Request for digoxin 0.125mg daily. Last office visit 5-20-19, next office visit 11-25-19.  Refills per verbal order from Dr. Mainor Sommer.

## 2019-09-24 ENCOUNTER — OFFICE VISIT (OUTPATIENT)
Dept: INTERNAL MEDICINE CLINIC | Age: 74
End: 2019-09-24

## 2019-09-24 VITALS
HEIGHT: 63 IN | TEMPERATURE: 97.8 F | BODY MASS INDEX: 31.71 KG/M2 | DIASTOLIC BLOOD PRESSURE: 72 MMHG | SYSTOLIC BLOOD PRESSURE: 130 MMHG | OXYGEN SATURATION: 98 % | WEIGHT: 179 LBS | HEART RATE: 68 BPM | RESPIRATION RATE: 18 BRPM

## 2019-09-24 DIAGNOSIS — Z13.39 SCREENING FOR ALCOHOLISM: ICD-10-CM

## 2019-09-24 DIAGNOSIS — E66.9 OBESITY (BMI 30.0-34.9): ICD-10-CM

## 2019-09-24 DIAGNOSIS — I50.32 CHRONIC DIASTOLIC HEART FAILURE (HCC): ICD-10-CM

## 2019-09-24 DIAGNOSIS — I10 ESSENTIAL HYPERTENSION: ICD-10-CM

## 2019-09-24 DIAGNOSIS — Z71.89 ADVANCED CARE PLANNING/COUNSELING DISCUSSION: ICD-10-CM

## 2019-09-24 DIAGNOSIS — J41.0 SIMPLE CHRONIC BRONCHITIS (HCC): ICD-10-CM

## 2019-09-24 DIAGNOSIS — Z00.00 MEDICARE ANNUAL WELLNESS VISIT, SUBSEQUENT: Primary | ICD-10-CM

## 2019-09-24 DIAGNOSIS — Z13.31 SCREENING FOR DEPRESSION: ICD-10-CM

## 2019-09-24 DIAGNOSIS — Z12.11 COLON CANCER SCREENING: ICD-10-CM

## 2019-09-24 DIAGNOSIS — Z23 ENCOUNTER FOR IMMUNIZATION: ICD-10-CM

## 2019-09-24 DIAGNOSIS — I48.20 CHRONIC ATRIAL FIBRILLATION (HCC): ICD-10-CM

## 2019-09-24 DIAGNOSIS — E78.00 PURE HYPERCHOLESTEROLEMIA: ICD-10-CM

## 2019-09-24 DIAGNOSIS — M54.50 ACUTE BILATERAL LOW BACK PAIN WITHOUT SCIATICA: ICD-10-CM

## 2019-09-24 DIAGNOSIS — E11.21 TYPE 2 DIABETES WITH NEPHROPATHY (HCC): ICD-10-CM

## 2019-09-24 NOTE — PATIENT INSTRUCTIONS
Medicare Wellness Visit, Female     The best way to live healthy is to have a lifestyle where you eat a well-balanced diet, exercise regularly, limit alcohol use, and quit all forms of tobacco/nicotine, if applicable. Regular preventive services are another way to keep healthy. Preventive services (vaccines, screening tests, monitoring & exams) can help personalize your care plan, which helps you manage your own care. Screening tests can find health problems at the earliest stages, when they are easiest to treat. [facility] follows the current, evidence-based guidelines published by the [facility] ([facility]) when recommending preventive services for our patients. Because we follow these guidelines, sometimes recommendations change over time as research supports it. (For example, mammograms used to be recommended annually. Even though Medicare will still pay for an annual mammogram, the newer guidelines recommend a mammogram every two years for women of average risk.)  Of course, you and your doctor may decide to screen more often for some diseases, based on your risk and your health status. Preventive services for you include:  - Medicare offers their members a free annual wellness visit, which is time for you and your primary care provider to discuss and plan for your preventive service needs. Take advantage of this benefit every year!  -All adults over the age of 72 should receive the recommended pneumonia vaccines. Current USPSTF guidelines recommend a series of two vaccines for the best pneumonia protection.   -All adults should have a flu vaccine yearly and a tetanus vaccine every 10 years. All adults age 61 and older should receive a shingles vaccine once in their lifetime.    -A bone mass density test is recommended when a woman turns 65 to screen for osteoporosis. This test is only recommended one time, as a screening.  Some providers will use this same test as a disease monitoring tool if you already have osteoporosis. -All adults age 38-68 who are overweight should have a diabetes screening test once every three years.   -Other screening tests and preventive services for persons with diabetes include: an eye exam to screen for diabetic retinopathy, a kidney function test, a foot exam, and stricter control over your cholesterol.   -Cardiovascular screening for adults with routine risk involves an electrocardiogram (ECG) at intervals determined by your doctor.   -Colorectal cancer screenings should be done for adults age 54-65 with no increased risk factors for colorectal cancer. There are a number of acceptable methods of screening for this type of cancer. Each test has its own benefits and drawbacks. Discuss with your doctor what is most appropriate for you during your annual wellness visit. The different tests include: colonoscopy (considered the best screening method), a fecal occult blood test, a fecal DNA test, and sigmoidoscopy. -Breast cancer screenings are recommended every other year for women of normal risk, age 54-69.  -Cervical cancer screenings for women over age 72 are only recommended with certain risk factors.   -All adults born between [facility] should be screened once for Hepatitis C. Here is a list of your current Health Maintenance items (your personalized list of preventive services) with a due date:  Health Maintenance Due   Topic Date Due    DTaP/Tdap/Td  (1 - Tdap) 06/08/1964    Shingles Vaccine (1 of 2) 06/08/1993    Eye Exam  06/14/2018    Glaucoma Screening   06/23/2018    Annual Well Visit  05/24/2019    Hemoglobin A1C    05/26/2019    Mammogram  06/11/2019    Flu Vaccine  08/01/2019              Learning About Living Heath  What is a living will? A living will is a legal form you use to write down the kind of care you want at the end of your life.  It is used by the health professionals who will treat you if you aren't able to decide for yourself. If you put your wishes in writing, your loved ones and others will know what kind of care you want. They won't need to guess. This can ease your mind and be helpful to others. A living will is not the same as an estate or property will. An estate will explains what you want to happen with your money and property after you die. Is a living will a legal document? A living will is a legal document. Each state has its own laws about living [place]. If you move to another state, make sure that your living will is legal in the state where you now live. Or you might use a universal form that has been approved by many states. This kind of form can sometimes be completed and stored online. Your electronic copy will then be available wherever you have a connection to the Internet. In most cases, doctors will respect your wishes even if you have a form from a different state. · You don't need an  to complete a living will. But legal advice can be helpful if your state's laws are unclear, your health history is complicated, or your family can't agree on what should be in your living will. · You can change your living will at any time. Some people find that their wishes about end-of-life care change as their health changes. · In addition to making a living will, think about completing a medical power of  form. This form lets you name the person you want to make end-of-life treatment decisions for you (your \"health care agent\") if you're not able to. Many hospitals and nursing homes will give you the forms you need to complete a living will and a medical power of . · Your living will is used only if you can't make or communicate decisions for yourself anymore. If you become able to make decisions again, you can accept or refuse any treatment, no matter what you wrote in your living will. · Your state may offer an online registry.  This is a place where you can store your living will online so the doctors and nurses who need to treat you can find it right away. What should you think about when creating a living will? Talk about your end-of-life wishes with your family members and your doctor. Let them know what you want. That way the people making decisions for you won't be surprised by your choices. Think about these questions as you make your living will:  · Do you know enough about life support methods that might be used? If not, talk to your doctor so you know what might be done if you can't breathe on your own, your heart stops, or you're unable to swallow. · What things would you still want to be able to do after you receive life-support methods? Would you want to be able to walk? To speak? To eat on your own? To live without the help of machines? · If you have a choice, where do you want to be cared for? In your home? At a hospital or nursing home? · Do you want certain Buddhism practices performed if you become very ill? · If you have a choice at the end of your life, where would you prefer to die? At home? In a hospital or nursing home? Somewhere else? · Would you prefer to be buried or cremated? · Do you want your organs to be donated after you die? What should you do with your living will? · Make sure that your family members and your health care agent have copies of your living will. · Give your doctor a copy of your living will to keep in your medical record. If you have more than one doctor, make sure that each one has a copy. · You may want to put a copy of your living will where it can be easily found. Where can you learn more? Go to http://brandy-giorgio.info/. Enter C467 in the search box to learn more about \"Learning About Living Shirin Gunn. \"  Current as of: August 8, 2016  Content Version: 11.3  © 4989-0451 [employer].  Care instructions adapted under license by [employer] (which disclaims liability or warranty for this information). If you have questions about a medical condition or this instruction, always ask your healthcare professional. [place] [address] any warranty or liability for your use of this information.

## 2019-09-24 NOTE — PROGRESS NOTES
Belen Burgess is a 66 y.o. female who was seen in clinic today (9/24/2019) for a full physical.           Assessment & Plan:   Diagnoses and all orders for this visit:    1. Medicare annual wellness visit, subsequent    2. Advanced care planning/counseling discussion    3. Encounter for immunization  -     ADMIN INFLUENZA VIRUS VAC  -     INFLUENZA VACCINE INACTIVATED (IIV), SUBUNIT, ADJUVANTED, IM  -     pneumococcal 13 allie conj dip (PREVNAR-13) 0.5 mL syrg injection; 0.5 mL by IntraMUSCular route once for 1 dose.  -     varicella-zoster recombinant, PF, (SHINGRIX, PF,) 50 mcg/0.5 mL susr injection; 0.5 ml IM once and then repeat in 2-6 months.  -     diph,Pertuss,Acell,,Tet Vac-PF (ADACEL) 2 Lf-(2.5-5-3-5 mcg)-5Lf/0.5 mL susp; 0.5 mL by IntraMUSCular route once for 1 dose. -     IA ANNUAL ALCOHOL SCREEN 15 MIN    4. Screening for alcoholism  -     DEPRESSION SCREEN ANNUAL    5. Screening for depression    6. Colon cancer screening  -     OCCULT BLOOD IMMUNOASSAY,DIAGNOSTIC    7. Type [facility] ([facility]- well controlled, reviewed following up with specialists and/or referrals placed below and continue current plan pending review of labs. -     REFERRAL TO OPHTHALMOLOGY  -     METABOLIC PANEL, COMPREHENSIVE  -     CBC W/O DIFF  -     HEMOGLOBIN A1C WITH EAG  -     LIPID PANEL  -     MICROALBUMIN, UR, RAND W/ MICROALB/CREAT RATIO  -      DIABETES FOOT EXAM    8. Essential hypertension- well controlled, continue current treatment pending review of labs   -     METABOLIC PANEL, COMPREHENSIVE  -     CBC W/O DIFF    9. Pure hypercholesterolemia- at goal, continue current treatment pending review of labs   -     METABOLIC PANEL, COMPREHENSIVE  -     LIPID PANEL    10. Obesity (BMI 30.0-34.9)- improved, congratulated, I have recommended the following interventions: encourage exercise and lifestyle education regarding diet.      11. Simple chronic bronchitis ([facility]- well controlled, continue current treatment     12. Chronic diastolic heart failure ([facility]- well controlled, asymptomatic, no changes pending review of labs, defer to specialist    -     METABOLIC PANEL, COMPREHENSIVE  -     CBC W/O DIFF    13. Chronic atrial fibrillation ([facility]- asymptomatic, in NSR currently, defer to specialist   -     CBC W/O DIFF    14. Acute bilateral low back pain without sciatica- this is a new problem, symptoms are: improved, differential dx reviewed with the patient, favor muscular. Will treat with: ice, rest, stretching. Red flags were reviewed with the patient to RTC or notify me, expected time course for resolution reviewed. Follow-up and Dispositions    · Return in about 6 months (around 3/24/2020), or if symptoms worsen or fail to improve.            ------------------------------------------------------------------------------------------    Subjective: Annual Wellness Visit- Subsequent Visit    End of Life Planning: This was discussed with her today and she has NO advanced directive  - add't info provided. Reviewed DNR/DNI and patient is not interested.       Depression Screen:  3 most recent PHQ Screens 9/24/2019   Little interest or pleasure in doing things More than half the days   Feeling down, depressed, irritable, or hopeless More than half the days   Total Score PHQ 2 4   Trouble falling or staying asleep, or sleeping too much Nearly every day   Feeling tired or having little energy More than half the days   Poor appetite, weight loss, or overeating Not at all   Feeling bad about yourself - or that you are a failure or have let yourself or your family down Not at all   Trouble concentrating on things such as school, work, reading, or watching TV Not at all   Moving or speaking so slowly that other people could have noticed; or the opposite being so fidgety that others notice Not at all   Thoughts of being better off dead, or hurting yourself in some way Not at all   PHQ 9 Score 9   How difficult have these problems made it for you to do your work, take care of your home and get along with others Somewhat difficult         Fall Risk:   Fall Risk Assessment, last 12 mths 9/24/2019   Able to walk? Yes   Fall in past 12 months? No   Fall with injury? -   Number of falls in past 12 months -   Fall Risk Score -       Abuse Screen:  Abuse Screening Questionnaire 9/24/2019   Do you ever feel afraid of your partner? N   Are you in a relationship with someone who physically or mentally threatens you? N   Is it safe for you to go home? Y         Alcohol Risk Factor Screening: On any occasion during the past 3 months, have you had more than 3 drinks containing alcohol? No  Do you average more than 7 drinks per week? No    Hearing Loss: Hearing is good    Cognition Screen:  Has your family/caregiver stated any concerns about your memory: no  Cognition: appears appropriate for age attention/concentration and appears appropriate safety awareness    Activities of Daily Living:    Requires assistance with: no ADLs  Home contains: grab bars  Exercise: moderately active    Adult Nutrition Screen:  No risk factors noted. Health Maintenance  Daily Aspirin: yes  Bone Density: UTD - done 6/11/18 - normal  Glaucoma Screening: referal placed    Immunizations:   Influenza: will get this done today  Tetanus: not UTD- script provided  Shingles: due for Shingrix - script provided  Pneumonia: due for Prevnar & script provided  Cancer screening:   Cervical: reviewed guidelines, n/a  Breast: reviewed guidelines, scheduled for next month  Colon: reviewed guidelines, has not done last 3 yrs, reports interested this year, FIT testing ordered.        Patient Care Team:  Saul Langford MD as PCP - General (Internal Medicine)  Sury Coats, RN as [facility] (Internal Medicine)  Sindy Freitas RN as [facility] (Cardiology)  Melodye Cogan, MD (Cardiology)  Angy Guzman MD (Sleep Medicine)  Annmarie Dwyer MD (Cardiology)       In addition to the above issues we also reviewed the following acute and/or chronic problems:    Endocrine Review  She is seen for diabetes. Since last visit she reports: weight has decreased. Home glucose monitoring: is not performed. She reports medication compliance: n/a - not on medications (diet controlled). Diabetic diet compliance: compliant all of the time. Lab review: labs reviewed and discussed with patient. Cardiovascular Review  The patient has hypertension and hyperlipidemia. Since last visit: weight has decreased. She reports taking medications as instructed, no medication side effects noted, home BP monitoring in range of 328'J systolic over 43'W diastolic. Diet and Lifestyle: generally follows a low fat low cholesterol diet, generally follows a low sodium diet, exercises sporadically. Labs: reviewed and discussed with patient. Pulmonary Review  The patient is being seen for COPD. Symptoms occur: infrequently. Current limitations in activity: none. Coughing when present is described as: none. Wheezing when present is described as: rare, only for a few seconds mostly w/ temperature changes (hot air). Regimen compliance: The patient reports adherence to this regimen. Patient does not smoke cigarettes. The following sections were reviewed & updated as appropriate: PMH, PSH, FH, and SH. Patient Active Problem List   Diagnosis Code    Atrial fibrillation ([facility]) I48.91    Mitral valve disorder I05.9    COPD (chronic obstructive pulmonary disease) (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 9) E66.9    Pure hypercholesterolemia E78.00    Type 2 diabetes with nephropathy ([facility] E11.21 Prior to Admission medications    Medication Sig Start Date End Date Taking? Authorizing Provider   digoxin (LANOXIN) 0.125 mg tablet Take 1 Tab by mouth daily. 8/27/19  Yes Stephanie Andrade MD   fluticasone propion-salmeterol (ADVAIR DISKUS) 250-50 mcg/dose diskus inhaler TAKE 1 PUFF BY MOUTH TWICE A DAY 6/9/19  Yes Nawaf Hall MD   valsartan-hydroCHLOROthiazide (DIOVAN-HCT) 320-25 mg per tablet TAKE 1 TABLET BY MOUTH EVERY DAY 5/24/19  Yes Nawaf Hall MD   atorvastatin (LIPITOR) 10 mg tablet TAKE 1 TABLET BY MOUTH EVERY DAY 5/23/19  Yes Nawaf Hall MD   atenolol (TENORMIN) 25 mg tablet TAKE 1 TABLET BY MOUTH EVERY DAY 2/18/19  Yes Stephanie Andrade MD   albuterol (PROVENTIL HFA, VENTOLIN HFA, PROAIR HFA) 90 mcg/actuation inhaler Take 1 Puff by inhalation every six (6) hours as needed for Wheezing. 10/26/18  Yes Elmo Garcia NP   apixaban (ELIQUIS) 5 mg tablet TAKE 1 TAB BY MOUTH TWO (2) TIMES A DAY. 9/4/18  Yes Stephanie Andrade MD   FLUTICASONE PROPIONATE (FLONASE NA) by Nasal route as needed. Yes Provider, Historical   fexofenadine (ALLEGRA) 180 mg tablet Take 180 mg by mouth daily as needed for Allergies. Yes Provider, Historical   triamcinolone acetonide (KENALOG) 0.1 % topical cream APPLY A THIN LAYER TO AFFECTED AREA(S) AS DIRECTED TWICE A DAY AS NEEDED 5/17/17  Yes Nawaf Hall MD   albuterol-ipratropium (DUO-NEB) 2.5 mg-0.5 mg/3 ml nebu 3 mL by Nebulization route four (4) times daily. Patient taking differently: 3 mL by Nebulization route as needed. 5/10/16  Yes Nawaf Hall MD   furosemide (LASIX) 20 mg tablet 1 tablet daily  Patient taking differently: 20 mg as needed. 1 tablet daily 7/14/14  Yes Nawaf Hall MD   tiotropium [facility]) 18 mcg inhalation capsule Take 1 Cap by inhalation daily. Patient taking differently: Take 1 Cap by inhalation as needed.  7/14/14  Yes Nawaf Hall MD   aspirin 81 mg tablet Take 1 Tab by mouth daily. 1/9/14  Yes Dawson Norris MD   cpap machine kit by Does Not Apply route nightly. Yes Provider, Historical   multivitamin (ONE A DAY) tablet Take 1 Tab by mouth daily. Provider, Historical          Allergies   Allergen Reactions    Cetirizine Hives     Other reaction(s): Hives    Amlodipine Swelling     lower extremity swelling    Coreg [Carvedilol] Other (comments)     sweating    Erythromycin Hives    Verapamil Other (comments)     Patient unable to take verapamil for rate control secondary to peripheral edema/maliase/feeling of being high. Review of Systems   Constitutional: Negative for chills and fever. Respiratory: Negative for cough and shortness of breath. Cardiovascular: Negative for chest pain and palpitations. Gastrointestinal: Negative for abdominal pain, blood in stool, constipation, diarrhea, heartburn, nausea and vomiting. Musculoskeletal: Positive for back pain. Negative for joint pain and myalgias. Back pain last few weeks/month, no trauma, no trigger, using back brace & BenGay with good relief   Neurological: Negative for tingling, focal weakness and headaches. Endo/Heme/Allergies: Does not bruise/bleed easily. Psychiatric/Behavioral: Negative for depression. The patient is not nervous/anxious and does not have insomnia. Objective:   Physical Exam   Constitutional: She appears well-developed. No distress. obese   HENT:   Mouth/Throat: Mucous membranes are normal.   Eyes: Conjunctivae and lids are normal. No scleral icterus. Neck: Neck supple. No thyromegaly present. Cardiovascular: Regular rhythm and normal heart sounds. No murmur heard. Pulmonary/Chest: Effort normal and breath sounds normal. She has no wheezes. She has no rales. Abdominal: Soft. Bowel sounds are normal. She exhibits no mass. There is no hepatosplenomegaly. There is no tenderness.  A hernia (ventral & umbilical, 9-3MY, not-reducable, soft, non-tender) is present. Musculoskeletal: She exhibits no edema. Lumbar back: She exhibits normal range of motion, no tenderness, no bony tenderness, no pain and no spasm. Lymphadenopathy:     She has no cervical adenopathy. Neurological:        Left Foot: Inspection: normal.  Pulse DP: 2+ (normal). Filament test: normal sensation with micro filament. Right Foot: Inspection: normal.  Pulse DP: 2+ (normal). Filament test: normal sensation with micro filament. Skin: No rash noted. Psychiatric: She has a normal mood and affect. Her behavior is normal.          Visit Vitals  /72   Pulse 68   Temp 97.8 °F (36.6 °C) (Oral)   Resp 18   Ht 5' 3\" (1.6 m)   Wt 179 lb (81.2 kg)   SpO2 98%   BMI 31.71 kg/m²          Advised her to call back or return to office if symptoms worsen/change/persist.  Discussed expected course/resolution/complications of diagnosis in detail with patient. Medication risks/benefits/costs/interactions/alternatives discussed with patient. She was given an after visit summary which includes diagnoses, current medications, & vitals. She expressed understanding with the diagnosis and plan. Aspects of this note may have been generated using voice recognition software. Despite editing, there may be some syntax errors.        Francisco Javier Roberto MD

## 2019-09-24 NOTE — ACP (ADVANCE CARE PLANNING)
Advance Care Planning (ACP) Provider Note - Comprehensive     Date of ACP Conversation: 09/24/19  Persons included in Conversation:  patient  Length of ACP Conversation in minutes: <16 minutes (Non-Billable)    Authorized Decision Maker (if patient is incapable of making informed decisions): Other Legally Authorized Decision Maker (e.g. Next of Kin)      She has NO advanced directive  - add't info provided. Reviewed DNR/DNI and patient is not interested. General ACP for ALL Patients with Decision Making Capacity:  Importance of advance care planning, including choosing a healthcare agent to communicate patient's healthcare decisions if patient lost the ability to make decisions, such as after a sudden illness or accident  Understanding of the healthcare agent role was assessed and information provided  Opportunity offered to explore how cultural, Amish, spiritual, or personal beliefs would affect decisions for future care  Exploration of values, goals, and preferences if recovery is not expected, even with continued medical treatment in the event of: Imminent death or severe, permanent brain injury    For Serious or Chronic Illness:  Understanding of CPR, goals and expected outcomes, benefits and burdens discussed.   Understanding of medical condition  Information on CPR success rates provided (e.g. for CPR in hospital, survival to d/c at two weeks is 22%, for chronically ill or elderly/frail survival is less than 3%)    Interventions Provided:  Recommended communicating the plan and making copies for the healthcare agent, personal physician, and others as appropriate (e.g., health system)  Recommended review of completed ACP document annually or upon change in health status

## 2019-09-25 LAB
ALBUMIN SERPL-MCNC: 4.3 G/DL (ref 3.5–4.8)
ALBUMIN/CREAT UR: 102.1 MG/G CREAT (ref 0–30)
ALBUMIN/GLOB SERPL: 1.8 {RATIO} (ref 1.2–2.2)
ALP SERPL-CCNC: 103 IU/L (ref 39–117)
ALT SERPL-CCNC: 15 IU/L (ref 0–32)
AST SERPL-CCNC: 15 IU/L (ref 0–40)
BILIRUB SERPL-MCNC: 0.6 MG/DL (ref 0–1.2)
BUN SERPL-MCNC: 20 MG/DL (ref 8–27)
BUN/CREAT SERPL: 27 (ref 12–28)
CALCIUM SERPL-MCNC: 9.4 MG/DL (ref 8.7–10.3)
CHLORIDE SERPL-SCNC: 102 MMOL/L (ref 96–106)
CHOLEST SERPL-MCNC: 112 MG/DL (ref 100–199)
CO2 SERPL-SCNC: 27 MMOL/L (ref 20–29)
CREAT SERPL-MCNC: 0.74 MG/DL (ref 0.57–1)
CREAT UR-MCNC: 95.4 MG/DL
ERYTHROCYTE [DISTWIDTH] IN BLOOD BY AUTOMATED COUNT: 12.2 % (ref 12.3–15.4)
EST. AVERAGE GLUCOSE BLD GHB EST-MCNC: 140 MG/DL
GLOBULIN SER CALC-MCNC: 2.4 G/DL (ref 1.5–4.5)
GLUCOSE SERPL-MCNC: 96 MG/DL (ref 65–99)
HBA1C MFR BLD: 6.5 % (ref 4.8–5.6)
HCT VFR BLD AUTO: 38.7 % (ref 34–46.6)
HDLC SERPL-MCNC: 36 MG/DL
HGB BLD-MCNC: 12.6 G/DL (ref 11.1–15.9)
LDLC SERPL CALC-MCNC: 60 MG/DL (ref 0–99)
MCH RBC QN AUTO: 28.8 PG (ref 26.6–33)
MCHC RBC AUTO-ENTMCNC: 32.6 G/DL (ref 31.5–35.7)
MCV RBC AUTO: 89 FL (ref 79–97)
MICROALBUMIN UR-MCNC: 97.4 UG/ML
PLATELET # BLD AUTO: 253 X10E3/UL (ref 150–450)
POTASSIUM SERPL-SCNC: 3.9 MMOL/L (ref 3.5–5.2)
PROT SERPL-MCNC: 6.7 G/DL (ref 6–8.5)
RBC # BLD AUTO: 4.37 X10E6/UL (ref 3.77–5.28)
SODIUM SERPL-SCNC: 145 MMOL/L (ref 134–144)
TRIGL SERPL-MCNC: 78 MG/DL (ref 0–149)
VLDLC SERPL CALC-MCNC: 16 MG/DL (ref 5–40)
WBC # BLD AUTO: 8.1 X10E3/UL (ref 3.4–10.8)

## 2019-09-25 NOTE — PROGRESS NOTES
Letter sent to patient. All labs are stable or at goal except for abnormal urine, slightly improved, on max dose of ARB, no changes.

## 2019-10-08 DIAGNOSIS — I48.20 CHRONIC ATRIAL FIBRILLATION (HCC): Primary | ICD-10-CM

## 2019-10-08 NOTE — TELEPHONE ENCOUNTER
Request for Eliquis 5mg BID. Last office visit 5-20-19, next office visit 11-25-19.  Refills per verbal order from Dr. Jose A Dooley.

## 2019-10-23 ENCOUNTER — HOSPITAL ENCOUNTER (OUTPATIENT)
Dept: MAMMOGRAPHY | Age: 74
Discharge: HOME | End: 2019-10-23
Attending: INTERNAL MEDICINE
Payer: MEDICARE

## 2019-10-23 DIAGNOSIS — Z12.39 BREAST SCREENING: ICD-10-CM

## 2019-10-23 PROCEDURE — 77063 BREAST TOMOSYNTHESIS BI: CPT

## 2019-11-17 DIAGNOSIS — I10 ESSENTIAL HYPERTENSION: ICD-10-CM

## 2019-11-17 DIAGNOSIS — E11.9 CONTROLLED TYPE 2 DIABETES MELLITUS WITHOUT COMPLICATION, WITHOUT LONG-TERM CURRENT USE OF INSULIN (HCC): ICD-10-CM

## 2019-11-18 RX ORDER — VALSARTAN AND HYDROCHLOROTHIAZIDE 320; 25 MG/1; MG/1
TABLET, FILM COATED ORAL
Qty: 90 TAB | Refills: 1 | Status: SHIPPED | OUTPATIENT
Start: 2019-11-18 | End: 2020-05-21

## 2019-12-04 ENCOUNTER — TELEPHONE (OUTPATIENT)
Dept: CARDIOLOGY CLINIC | Age: 74
End: 2019-12-04

## 2019-12-04 NOTE — TELEPHONE ENCOUNTER
----- Message from Mady Burton MD sent at 12/3/2019  9:39 PM EST -----  Echo looks fine, EF is normal  No major valve issue  Keep fu  Future Appointments  1/27/2020  11:40 AM   Silva Burgess MD       [place] [id number]

## 2019-12-04 NOTE — TELEPHONE ENCOUNTER
Patient is returning a call to [facility] regarding echo results.      Phone: [phone]

## 2019-12-08 RX ORDER — ATORVASTATIN CALCIUM 10 MG/1
TABLET, FILM COATED ORAL
Qty: 90 TAB | Refills: 1 | Status: SHIPPED | OUTPATIENT
Start: 2019-12-08 | End: 2020-06-21

## 2020-01-27 ENCOUNTER — OFFICE VISIT (OUTPATIENT)
Dept: CARDIOLOGY CLINIC | Age: 75
End: 2020-01-27

## 2020-01-27 VITALS
RESPIRATION RATE: 14 BRPM | BODY MASS INDEX: 31.18 KG/M2 | WEIGHT: 176 LBS | HEIGHT: 63 IN | SYSTOLIC BLOOD PRESSURE: 122 MMHG | HEART RATE: 80 BPM | DIASTOLIC BLOOD PRESSURE: 82 MMHG

## 2020-01-27 DIAGNOSIS — I48.20 CHRONIC ATRIAL FIBRILLATION (HCC): Primary | ICD-10-CM

## 2020-01-27 DIAGNOSIS — I42.2 APICAL VARIANT HYPERTROPHIC CARDIOMYOPATHY (HCC): ICD-10-CM

## 2020-01-27 DIAGNOSIS — I05.9 MITRAL VALVE DISORDER: ICD-10-CM

## 2020-01-27 DIAGNOSIS — I50.32 CHRONIC DIASTOLIC HEART FAILURE (HCC): ICD-10-CM

## 2020-01-27 DIAGNOSIS — I10 ESSENTIAL HYPERTENSION: ICD-10-CM

## 2020-01-27 NOTE — PROGRESS NOTES
Cassandra Hennessy     1945       Dejon Cote MD, [facility]  Date of Visit-1/27/2020   PCP is Alyssa Rubi MD   [facility] [place]  [facility]  HPI:  Cassandra Hennessy is a 66 y.o. female     Follow up from May. 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 Echo in November. 11/18/19   ECHO ADULT COMPLETE 11/19/2019 11/19/2019    Narrative · Left Ventricle: Normal systolic function (ejection fraction normal). Small left ventricle. Severe apical hypertrophy. Calculated left   ventricular ejection fraction is 73%. · Left Atrium: Severely dilated left atrium. · Right Atrium: Moderately dilated right atrium. · Aortic Valve: Mild aortic valve sclerosis with no significant stenosis. · Tricuspid Valve: Mild to moderate tricuspid valve regurgitation is   present. Signed by: Nori Whitehead MD     Today the patient reports that she is doing well overall. She indicates that when she is upset she has shortness of breath. The patient notes some hair loss. Otherwise feels well with no chest pain, palps or syncope. EKG 1/27/2020: Atrial fibrillation  at 81 with LVH and strain, STT wave abnormalities. Assessment/Plan:     1. Chronic atrial fibrillation  Rate is controlled with digoxinn and atenolol, continue Eliquis. CHADS VASC score of 3 (age and HTN). Despite the hairloss she does not want to make the change (not on ACE) and it maybe due to other reasons. EF is normal.  - AMB POC EKG ROUTINE W/ 12 LEADS, INTER & REP    2. Apical variant hypertrophic cardiomyopathy ([address]  MRI done in June 2015 confirmed an apical septal wall at 20 mm with typical shape noted.  Echo most recently reviewed   --overall this carries a fairly benign prognosis when confined to apex and not global.     3. Chronic diastolic heart failure (HCC)  Compensated NYHA 1-2   low dose of diuretic as needed. 4. Essential hypertension  Well-controlled on ARB, diuretic, BB. BP Readings from Last 6 Encounters:   01/27/20 122/82   09/24/19 130/72   05/20/19 130/70   11/26/18 122/76   11/19/18 148/88   10/26/18 129/82       5. Mitral valve disorder by hx but  Prolapse was not seen on MRI, echo noted no MR. Follow up in 6 months. Future Appointments   Date Time Provider Franchesca Godinez   7/27/2020 11:20 AM Fahad Velez  [address]       [facility] CAD CHF Meds             atorvastatin (LIPITOR) 10 mg tablet (Taking) TAKE 1 TABLET BY MOUTH EVERY DAY    valsartan-hydroCHLOROthiazide (DIOVAN-HCT) 320-25 mg per tablet (Taking) TAKE 1 TABLET BY MOUTH EVERY DAY    apixaban (ELIQUIS) 5 mg tablet (Taking) Take 1 Tab by mouth two (2) times a day. digoxin (LANOXIN) 0.125 mg tablet (Taking) Take 1 Tab by mouth daily. atenolol (TENORMIN) 25 mg tablet (Taking) TAKE 1 TABLET BY MOUTH EVERY DAY    furosemide (LASIX) 20 mg tablet (Taking) 1 tablet daily    aspirin 81 mg tablet (Taking) Take 1 Tab by mouth daily. Impression:   1. Chronic atrial fibrillation    2. Apical variant hypertrophic cardiomyopathy ([address]    3. Chronic diastolic heart failure ([address]    4. Essential hypertension    5. Mitral valve disorder       Cardiac History:   Chronic atrial fibrillation   Diastolic CHF    ECHO 3-51-52= ef 55-60%, biatrial enlargement  ECHO 3- EF 70%, AK of apex with apical hypertrophy, mild TR,LAE, Pulm Htn    Admit Jan 2012 with asthma and May 2013; Chronic airway dz    Cardiac MRI     1. Apical hypertrophic cardiomyopathy. The apical septal wall measures 20   mm. The apical lateral and mid inferolateral wall measures 26 mm. Complete   obliteration of the LV apical cavity. [place] like shape of the LV cavity   typical of the apical hypertrophic cardiomyopathy. Hyperdynamic left   ventricular systolic function with end-systolic cavity obliteration. 3-D   LVEF 86%.  There is no systolic anterior motion of the mitral apparatus or no   LV outflow tract obstruction. 2. Normal right ventricular size and systolic function. RVEF 60%. 3. Mild 1+ mitral regurgitation. 4. Moderate 2+ tricuspid regurgitation. 5. Normal rest myocardial perfusion on first pass perfusion imaging. 6. On EGE and LGE imaging, there is no areas myocardial enhancement to   suggest any myocardial scar, recent or old myocardial infarction,   infiltration or inflammation. There is no features of myocardial sarcoidosis   or amyloidosis. There is no features of inflammatory cardiomyopathy such as   myocarditis. 7. Normal pleura and pericardium. Trace anterior and posterior pericardial   effusion. 8. Markedly dilated RA and LA. ROS-except as noted above. . A complete cardiac and respiratory are reviewed and negative except as above ; Resp-denies wheezing  or productive cough,. Const- No unusual weight loss or fever; Neuro-no recent seizure or CVA ; GI- No BRBPR, abdom pain, bloating ; - no  hematuria   see supplement sheet, initialed and to be scanned by staff  Past Medical History:   Diagnosis Date    Apical variant hypertrophic cardiomyopathy ([address]     Chronic diastolic heart failure ([address]     HTN (hypertension)     Hx of mammogram     LVH (left ventricular hypertrophy) due to hypertensive disease     Mild pulmonary hypertension ([address] 9/13/2012    Obstructive sleep apnea (adult) (pediatric) 07-    AHI: 5.0 per hour    Pneumonia 2013 admit [facility] Hx= reports that she quit smoking about 23 years ago. She has a 2.00 pack-year smoking history. She has never used smokeless tobacco. She reports that she does not drink alcohol or use drugs. Exam and Labs:    Visit Vitals  /82 (BP 1 Location: Left arm, BP Patient Position: Sitting)   Pulse 80   Resp 14   Ht 5' 3\" (1.6 m)   Wt 176 lb (79.8 kg)   BMI 31.18 kg/m²    @Constitutional:  NAD, comfortable  Head: NC,AT. Eyes: No scleral icterus.  Neck:  Neck supple. No JVD present. Throat: moist mucous membranes. Chest: Effort normal & normal respiratory excursion . Neurological: alert, conversant and oriented . Skin: Skin is not cold. No obvious systemic rash noted. Not diaphoretic. No erythema. Psychiatric:  Grossly normal mood and affect. Behavior appears normal. Extremities:  no clubbing or cyanosis. Abdomen: non distended    Lungs:breath sounds normal. No stridor. distress, wheezes or  Rales. Heart:    IRIR, normal S1, S2, no murmurs, rubs, clicks or gallops , PMI non displaced. Edema: Edema is none. Lab Results   Component Value Date/Time    Cholesterol, total 112 09/24/2019 11:27 AM    HDL Cholesterol 36 (L) 09/24/2019 11:27 AM    LDL, calculated 60 09/24/2019 11:27 AM    Triglyceride 78 09/24/2019 11:27 AM    CHOL/HDL Ratio 5.1 (H) 05/12/2013 04:20 AM     Lab Results   Component Value Date/Time    Sodium 145 (H) 09/24/2019 11:27 AM    Potassium 3.9 09/24/2019 11:27 AM    Chloride 102 09/24/2019 11:27 AM    CO2 27 09/24/2019 11:27 AM    Anion gap 6 05/15/2013 07:10 AM    Glucose 96 09/24/2019 11:27 AM    BUN 20 09/24/2019 11:27 AM    Creatinine 0.74 09/24/2019 11:27 AM    BUN/Creatinine ratio 27 09/24/2019 11:27 AM    GFR est AA 91 09/24/2019 11:27 AM    GFR est non-AA 79 09/24/2019 11:27 AM    Calcium 9.4 09/24/2019 11:27 AM      Wt Readings from Last 3 Encounters:   01/27/20 176 lb (79.8 kg)   11/18/19 179 lb (81.2 kg)   09/24/19 179 lb (81.2 kg)      BP Readings from Last 3 Encounters:   01/27/20 122/82   09/24/19 130/72   05/20/19 130/70      Current Outpatient Medications   Medication Sig    atorvastatin (LIPITOR) 10 mg tablet TAKE 1 TABLET BY MOUTH EVERY DAY    valsartan-hydroCHLOROthiazide (DIOVAN-HCT) 320-25 mg per tablet TAKE 1 TABLET BY MOUTH EVERY DAY    apixaban (ELIQUIS) 5 mg tablet Take 1 Tab by mouth two (2) times a day.  digoxin (LANOXIN) 0.125 mg tablet Take 1 Tab by mouth daily.     fluticasone propion-salmeterol (ADVAIR DISKUS) 250-50 mcg/dose diskus inhaler TAKE 1 PUFF BY MOUTH TWICE A DAY    atenolol (TENORMIN) 25 mg tablet TAKE 1 TABLET BY MOUTH EVERY DAY    albuterol (PROVENTIL HFA, VENTOLIN HFA, PROAIR HFA) 90 mcg/actuation inhaler Take 1 Puff by inhalation every six (6) hours as needed for Wheezing.  FLUTICASONE PROPIONATE (FLONASE NA) by Nasal route as needed.  fexofenadine (ALLEGRA) 180 mg tablet Take 180 mg by mouth daily as needed for Allergies.  triamcinolone acetonide (KENALOG) 0.1 % topical cream APPLY A THIN LAYER TO AFFECTED AREA(S) AS DIRECTED TWICE A DAY AS NEEDED    albuterol-ipratropium (DUO-NEB) 2.5 mg-0.5 mg/3 ml nebu 3 mL by Nebulization route four (4) times daily. (Patient taking differently: 3 mL by Nebulization route as needed.)    furosemide (LASIX) 20 mg tablet 1 tablet daily (Patient taking differently: 20 mg as needed. 1 tablet daily)    tiotropium (SPIRIVA) 18 mcg inhalation capsule Take 1 Cap by inhalation daily. (Patient taking differently: Take 1 Cap by inhalation as needed.)    aspirin 81 mg tablet Take 1 Tab by mouth daily.  cpap machine kit by Does Not Apply route nightly.  varicella-zoster recombinant, PF, (SHINGRIX, PF,) 50 mcg/0.5 mL susr injection 0.5 ml IM once and then repeat in 2-6 months.  multivitamin (ONE A DAY) tablet Take 1 Tab by mouth daily. No current facility-administered medications for this visit. Impression see above.        Transcribed by Nicole Loja, as dictated by Alejandro Win MD.

## 2020-01-27 NOTE — Clinical Note
1/27/20 Patient: Atul Herrera YOB: 1945 Date of Visit: 1/27/2020 Richrd Paget, MD 
[facility] [address] [facility] [address] [facility] [address] [address] VIA In Basket Dear Richrd Paget, MD, Thank you for referring Ms. Yulia Mullen to [facility] for evaluation. My notes for this consultation are attached. If you have questions, please do not hesitate to call me. I look forward to following your patient along with you.  
 
 
Sincerely, 
 
Ciara Hodges MD

## 2020-01-27 NOTE — PROGRESS NOTES
Visit Vitals  /82 (BP 1 Location: Left arm, BP Patient Position: Sitting)   Pulse 80   Resp 14   Ht 5' 3\" (1.6 m)   Wt 176 lb (79.8 kg)   BMI 31.18 kg/m²

## 2020-03-06 ENCOUNTER — OFFICE VISIT (OUTPATIENT)
Dept: INTERNAL MEDICINE CLINIC | Age: 75
End: 2020-03-06

## 2020-03-06 VITALS
SYSTOLIC BLOOD PRESSURE: 130 MMHG | HEART RATE: 63 BPM | OXYGEN SATURATION: 98 % | WEIGHT: 173 LBS | RESPIRATION RATE: 16 BRPM | BODY MASS INDEX: 30.65 KG/M2 | TEMPERATURE: 97.8 F | DIASTOLIC BLOOD PRESSURE: 60 MMHG

## 2020-03-06 DIAGNOSIS — R68.84 JAW PAIN: Primary | ICD-10-CM

## 2020-03-06 DIAGNOSIS — M17.11 PRIMARY OSTEOARTHRITIS OF RIGHT KNEE: ICD-10-CM

## 2020-03-06 NOTE — PROGRESS NOTES
Verified name and birth date for privacy precautions. Chart reviewed in preparation for today's visit.      Chief Complaint   Patient presents with    Facial Swelling     painful swelling in left cheek near ear x 2 days     Knee Pain     right knee pain and weakness strted this morning           Health Maintenance Due   Topic    DTaP/Tdap/Td series (1 - Tdap)    Shingrix Vaccine Age 49> (1 of 2)    Eye Exam Retinal or Dilated     GLAUCOMA SCREENING Q2Y          Wt Readings from Last 3 Encounters:   03/06/20 173 lb (78.5 kg)   01/27/20 176 lb (79.8 kg)   11/18/19 179 lb (81.2 kg)     Temp Readings from Last 3 Encounters:   03/06/20 97.8 °F (36.6 °C) (Oral)   09/24/19 97.8 °F (36.6 °C) (Oral)   11/26/18 97.7 °F (36.5 °C) (Oral)     BP Readings from Last 3 Encounters:   03/06/20 130/60   01/27/20 122/82   09/24/19 130/72     Pulse Readings from Last 3 Encounters:   03/06/20 63   01/27/20 80   09/24/19 68

## 2020-03-06 NOTE — PROGRESS NOTES
Subjective:      Gilma Owen is a 66 y.o. female who presents today with left sided jaw pain that radiates into her neck. Pain started yesterday. No trauma. Mostly lower jaw. No pain with eating. She notes mild swelling in this area. No URI symptoms. No ear pain. She also has intermittent pain in her right knee. No swelling. Improves with tylenol, but tylenol makes her sleepy. Not able to take NSAIDS due to her use of eliquis. Patient Active Problem List   Diagnosis Code    Atrial fibrillation ([facility]) I48.91    Mitral valve disorder I05.9    COPD (chronic obstructive pulmonary disease) ([facility]) J44.9    YOLANDA (obstructive sleep apnea) G47.33    Chronic diastolic heart failure ([facility]) I50.32    Apical variant hypertrophic cardiomyopathy ([facility]) Q44.7    Umbilical hernia without obstruction and without gangrene K42.9    HTN (hypertension) I10    Obesity (BMI 30.0-34. 9) E66.9    Pure hypercholesterolemia E78.00    Type 2 diabetes with nephropathy ([facility]) E11.21     Current Outpatient Medications   Medication Sig Dispense Refill    atorvastatin (LIPITOR) 10 mg tablet TAKE 1 TABLET BY MOUTH EVERY DAY 90 Tab 1    valsartan-hydroCHLOROthiazide (DIOVAN-HCT) 320-25 mg per tablet TAKE 1 TABLET BY MOUTH EVERY DAY 90 Tab 1    apixaban (ELIQUIS) 5 mg tablet Take 1 Tab by mouth two (2) times a day. 180 Tab 3    digoxin (LANOXIN) 0.125 mg tablet Take 1 Tab by mouth daily. 90 Tab 3    fluticasone propion-salmeterol (ADVAIR DISKUS) 250-50 mcg/dose diskus inhaler TAKE 1 PUFF BY MOUTH TWICE A  Each 0    atenolol (TENORMIN) 25 mg tablet TAKE 1 TABLET BY MOUTH EVERY DAY 90 Tab 3    albuterol (PROVENTIL HFA, VENTOLIN HFA, PROAIR HFA) 90 mcg/actuation inhaler Take 1 Puff by inhalation every six (6) hours as needed for Wheezing. 1 Inhaler 1    FLUTICASONE PROPIONATE (FLONASE NA) by Nasal route as needed.  fexofenadine (ALLEGRA) 180 mg tablet Take 180 mg by mouth daily as needed for Allergies.  triamcinolone acetonide (KENALOG) 0.1 % topical cream APPLY A THIN LAYER TO AFFECTED AREA(S) AS DIRECTED TWICE A DAY AS NEEDED 30 g 0    albuterol-ipratropium (DUO-NEB) 2.5 mg-0.5 mg/3 ml nebu 3 mL by Nebulization route four (4) times daily. (Patient taking differently: 3 mL by Nebulization route as needed.) 90 mL 1    furosemide (LASIX) 20 mg tablet 1 tablet daily (Patient taking differently: 20 mg as needed. 1 tablet daily) 90 Tab 1    tiotropium (SPIRIVA) 18 mcg inhalation capsule Take 1 Cap by inhalation daily. (Patient taking differently: Take 1 Cap by inhalation as needed.) 90 Cap 0    aspirin 81 mg tablet Take 1 Tab by mouth daily. 90 Tab 1    cpap machine kit by Does Not Apply route nightly. Review of Systems    Pertinent items are noted in HPI. Objective:     Visit Vitals  /60 (BP 1 Location: Left arm, BP Patient Position: Sitting)   Pulse 63   Temp 97.8 °F (36.6 °C) (Oral)   Resp 16   Wt 173 lb (78.5 kg)   SpO2 98%   BMI 30.65 kg/m²     General appearance: alert, cooperative, no distress, appears stated age  Head: Normocephalic, without obvious abnormality, atraumatic  Ears: normal TM's and external ear canals AU  Throat: Lips, mucosa, and tongue normal. Very poor dentation . Very few teeth. In left lower jaw, she only has 1 tooth. She has tenderness in the area of the gum around this tooth that radiates to her posterior jaw. No erythema along the gum line. No tenderness in TMJ with opening or closing of her jaw. Neck: supple, symmetrical, trachea midline, no adenopathy, no carotid bruit and no JVD  Extremities:  Crepitus in right knee with extension. No effusion. No erythema. Assessment/Plan:     1. Jaw pain  -pain is over lower left posterior jaw. She has very poor dentation. Only 1 tooth in the left lower jaw. Recommended patient see her dentist . Concerned for infection with the tooth that is left.      2. Primary osteoarthritis of right knee  -significant crepitis in right knee without effusion.  -may take tylenol arthritis 2 every 8 hours as needed. She asked about using otc creams such as aspercreme. I advised that she may use them. Follow-up Disposition:     Return if symptoms worsen or fail to improve. Advised patient to call back or return to office if symptoms worsen/change/persist.     Discussed expected course/resolution/complications of diagnosis in detail with patient. Medication risks/benefits/costs/interactions/alternatives discussed with patient. Patient was given an after visit summary which includes diagnoses, current medications, & vitals. Patient expressed understanding with the diagnosis and plan.

## 2020-03-12 RX ORDER — FLUTICASONE PROPIONATE AND SALMETEROL 250; 50 UG/1; UG/1
POWDER RESPIRATORY (INHALATION)
Qty: 180 EACH | Refills: 0 | Status: SHIPPED | OUTPATIENT
Start: 2020-03-12 | End: 2020-06-11

## 2020-03-12 NOTE — TELEPHONE ENCOUNTER
Requested Prescriptions     Pending Prescriptions Disp Refills    fluticasone propion-salmeteroL (Advair Diskus) 250-50 mcg/dose diskus inhaler 180 Each 0     Sig: TAKE 1 PUFF BY MOUTH TWICE A DAY     [facility]/pharmacy #[id number]- [place] [facility] - [address]  [phone]  NOV 6/3/20

## 2020-03-13 RX ORDER — FLUTICASONE PROPIONATE AND SALMETEROL 250; 50 UG/1; UG/1
POWDER RESPIRATORY (INHALATION)
Refills: 0 | OUTPATIENT
Start: 2020-03-13

## 2020-05-08 DIAGNOSIS — I10 ESSENTIAL HYPERTENSION: ICD-10-CM

## 2020-05-08 DIAGNOSIS — I48.20 CHRONIC ATRIAL FIBRILLATION (HCC): ICD-10-CM

## 2020-05-08 RX ORDER — ATENOLOL 25 MG/1
TABLET ORAL
Qty: 90 TAB | Refills: 3 | Status: SHIPPED | OUTPATIENT
Start: 2020-05-08 | End: 2021-05-04

## 2020-05-08 NOTE — TELEPHONE ENCOUNTER
Cardiologist: Dr. Bhavik Gray    Last appt: 1/27/2020  Future Appointments   Date Time Provider Franchesca Godinez   6/3/2020 10:00 AM [place] [address] [facility]   7/27/2020 11:20 AM Minerva Beltran  [address]       Requested Prescriptions     Signed Prescriptions Disp Refills    atenoloL (TENORMIN) 25 mg tablet 90 Tab 3     Sig: TAKE 1 TABLET BY MOUTH EVERY DAY     Authorizing Provider: Vicky Mcgowan     Ordering User: HEATHER Lopez         Refills VO per Dr. Bhavik Gray.

## 2020-05-21 DIAGNOSIS — E11.9 CONTROLLED TYPE 2 DIABETES MELLITUS WITHOUT COMPLICATION, WITHOUT LONG-TERM CURRENT USE OF INSULIN (HCC): ICD-10-CM

## 2020-05-21 DIAGNOSIS — I10 ESSENTIAL HYPERTENSION: ICD-10-CM

## 2020-05-21 RX ORDER — VALSARTAN AND HYDROCHLOROTHIAZIDE 320; 25 MG/1; MG/1
TABLET, FILM COATED ORAL
Qty: 90 TAB | Refills: 0 | Status: SHIPPED | OUTPATIENT
Start: 2020-05-21 | End: 2020-08-18

## 2020-06-05 ENCOUNTER — OFFICE VISIT (OUTPATIENT)
Dept: INTERNAL MEDICINE CLINIC | Age: 75
End: 2020-06-05

## 2020-06-05 VITALS — WEIGHT: 184 LBS | BODY MASS INDEX: 32.59 KG/M2 | SYSTOLIC BLOOD PRESSURE: 126 MMHG | DIASTOLIC BLOOD PRESSURE: 80 MMHG

## 2020-06-05 DIAGNOSIS — E66.9 OBESITY (BMI 30.0-34.9): ICD-10-CM

## 2020-06-05 DIAGNOSIS — E78.00 PURE HYPERCHOLESTEROLEMIA: ICD-10-CM

## 2020-06-05 DIAGNOSIS — E11.21 TYPE 2 DIABETES WITH NEPHROPATHY (HCC): Primary | ICD-10-CM

## 2020-06-05 DIAGNOSIS — J41.0 SIMPLE CHRONIC BRONCHITIS (HCC): ICD-10-CM

## 2020-06-05 DIAGNOSIS — I10 ESSENTIAL HYPERTENSION: ICD-10-CM

## 2020-06-05 NOTE — PROGRESS NOTES
Dwayne Good is a 66 y.o. female evaluated via telephone on 6/5/2020. She was a no show to appointment    Documentation:  I communicated with the patient and/or health care decision maker regarding:     Endocrine Review  She is seen for diabetes and obesity. Since last visit she reports: weight has increased. Testing: is not performed. She reports medication compliance: n/a - not on medications (diet controlled). Medication side effects: none. Diabetic diet compliance: compliant all of the time. Lab review: labs reviewed and discussed with patient. Cardiovascular Review  The patient has hypertension, hyperlipidemia and atrial fibrillation. Since last visit: weight has increased. She reports taking medications as instructed, no medication side effects noted, home BP monitoring in range of 174'F systolic over 05'P diastolic. Diet and Lifestyle: generally follows a low fat low cholesterol diet, generally follows a low sodium diet, sedentary. Labs: reviewed and discussed with patient. Pulmonary Review  The patient is being seen for COPD. Symptoms occur: never. Current limitations in activity: none. Coughing when present is described as: none. Wheezing when present is described as: none. Regimen compliance: The patient reports adherence to this regimen. Last use of rescue inhaler: a few months ago  Patient does not smoke cigarettes. Details of this discussion including any medical advice provided is documented below. Diagnoses and all orders for this visit:    1. Type 2 diabetes with nephropathy ([facility]- previous well controlled, overdue for labs, long term diabetic complications discussed, reviewed following up with specialists and/or referrals placed below and continue current plan pending review of labs. -     METABOLIC PANEL, COMPREHENSIVE  -     CBC W/O DIFF  -     HEMOGLOBIN A1C WITH EAG  -     LIPID PANEL  -     MICROALBUMIN, UR, RAND W/ MICROALB/CREAT RATIO    2.  Essential hypertension- well controlled, continue current treatment pending review of labs   -     METABOLIC PANEL, COMPREHENSIVE  -     CBC W/O DIFF    3. Pure hypercholesterolemia- at goal, continue current treatment pending review of labs   -     METABOLIC PANEL, COMPREHENSIVE  -     LIPID PANEL    4. Simple chronic bronchitis ([address]- well controlled, asymptomatic, no changes in medications    5. Obesity (BMI 30.0-34.9)- slightly worse, needs improvement, I have recommended the following interventions: encourage exercise and lifestyle education regarding diet. Declined immunizations at this time due to COVID restrictions. Follow-up and Dispositions    · Return in about 6 months (around 12/5/2020) for FULL PHYSICAL - 30 minutes. The following sections were reviewed and/or updated:  Prior to Admission medications    Medication Sig Start Date End Date Taking? Authorizing Provider   valsartan-hydroCHLOROthiazide (DIOVAN-HCT) 320-25 mg per tablet TAKE 1 TABLET BY MOUTH EVERY DAY 5/21/20  Yes Violetta Marcelo MD   atenoloL (TENORMIN) 25 mg tablet TAKE 1 TABLET BY MOUTH EVERY DAY 5/8/20  Yes Anastasiya Tejeda MD   fluticasone propion-salmeteroL (Advair Diskus) 250-50 mcg/dose diskus inhaler TAKE 1 PUFF BY MOUTH TWICE A DAY 3/12/20  Yes Violetta Marcelo MD   atorvastatin (LIPITOR) 10 mg tablet TAKE 1 TABLET BY MOUTH EVERY DAY 12/8/19  Yes Violetta Marcelo MD   apixaban (ELIQUIS) 5 mg tablet Take 1 Tab by mouth two (2) times a day. 10/8/19  Yes Anastasiya Tejeda MD   digoxin (LANOXIN) 0.125 mg tablet Take 1 Tab by mouth daily. 8/27/19  Yes Anastasiya Tejeda MD   albuterol (PROVENTIL HFA, VENTOLIN HFA, PROAIR HFA) 90 mcg/actuation inhaler Take 1 Puff by inhalation every six (6) hours as needed for Wheezing. 10/26/18  Yes Liane Garcia, DANNIELLE   FLUTICASONE PROPIONATE [facility] NA) by Nasal route as needed.    Yes Provider, Historical   fexofenadine (ALLEGRA) 180 mg tablet Take 180 mg by mouth daily as needed for Allergies. Yes Provider, Historical   triamcinolone acetonide (KENALOG) 0.1 % topical cream APPLY A THIN LAYER TO AFFECTED AREA(S) AS DIRECTED TWICE A DAY AS NEEDED 5/17/17  Yes Chio Brown MD   albuterol-ipratropium (DUO-NEB) 2.5 mg-0.5 mg/3 ml nebu 3 mL by Nebulization route four (4) times daily. Patient taking differently: 3 mL by Nebulization route as needed. 5/10/16  Yes Chio Brown MD   furosemide (LASIX) 20 mg tablet 1 tablet daily  Patient taking differently: 20 mg as needed. 1 tablet daily 7/14/14  Yes Chio Brown MD   tiotropium [facility]) 18 mcg inhalation capsule Take 1 Cap by inhalation daily. Patient taking differently: Take 1 Cap by inhalation as needed. 7/14/14  Yes Chio Brown MD   aspirin 81 mg tablet Take 1 Tab by mouth daily. 1/9/14  Yes Chio Brown MD   cpap machine kit by Does Not Apply route nightly. Yes Provider, Historical          Consent:  Carito Funes, who was seen by synchronous (real-time) audio only technology, and/or her healthcare decision maker, is aware that this patient-initiated, Telehealth encounter on 6/5/2020 is a billable service. She is aware that she may receive a bill for any such additional services and has provided verbal consent to proceed: Yes. Patient identification was verified prior to start of the visit. A caregiver was present when appropriate. Due to this being a TeleHealth encounter (during [place] [address] emergency), evaluation of the following organ systems was limited: VS/Constitutional/EENT/Resp/CV/GI//MS/Neuro/Skin/Heme-Lymph-Imm.   Pursuant to the emergency declaration under the [facility] waiver authority and the [employer] and [employer] Act, this Virtual Visit was conducted, with patient's (and/or legal guardian's) consent, to reduce the patient's risk of exposure to COVID-19 and provide necessary medical care. Services were provided through a synchronous discussion virtually to substitute for in-person clinic visit. I was at home. The patient was at home. I affirm this is a Patient Initiated Episode with an Established Patient who has not had a related appointment within my department in the past 7 days or scheduled within the next 24 hours.     Total Time: minutes: 11-20 minutes    Note: not billable if this call serves to triage the patient into an appointment for the relevant concern    Chalino Alanis MD

## 2020-06-11 RX ORDER — FLUTICASONE PROPIONATE AND SALMETEROL 250; 50 UG/1; UG/1
POWDER RESPIRATORY (INHALATION)
Qty: 180 EACH | Refills: 1 | Status: SHIPPED | OUTPATIENT
Start: 2020-06-11 | End: 2021-05-11 | Stop reason: SDUPTHER

## 2020-06-13 LAB
ALBUMIN SERPL-MCNC: 4.4 G/DL (ref 3.7–4.7)
ALBUMIN/CREAT UR: 11 MG/G CREAT (ref 0–29)
ALBUMIN/GLOB SERPL: 1.9 {RATIO} (ref 1.2–2.2)
ALP SERPL-CCNC: 104 IU/L (ref 39–117)
ALT SERPL-CCNC: 11 IU/L (ref 0–32)
AST SERPL-CCNC: 18 IU/L (ref 0–40)
BILIRUB SERPL-MCNC: 0.6 MG/DL (ref 0–1.2)
BUN SERPL-MCNC: 16 MG/DL (ref 8–27)
BUN/CREAT SERPL: 20 (ref 12–28)
CALCIUM SERPL-MCNC: 9.9 MG/DL (ref 8.7–10.3)
CHLORIDE SERPL-SCNC: 99 MMOL/L (ref 96–106)
CHOLEST SERPL-MCNC: 101 MG/DL (ref 100–199)
CO2 SERPL-SCNC: 31 MMOL/L (ref 20–29)
CREAT SERPL-MCNC: 0.79 MG/DL (ref 0.57–1)
CREAT UR-MCNC: 360.2 MG/DL
ERYTHROCYTE [DISTWIDTH] IN BLOOD BY AUTOMATED COUNT: 12.2 % (ref 11.7–15.4)
EST. AVERAGE GLUCOSE BLD GHB EST-MCNC: 143 MG/DL
GLOBULIN SER CALC-MCNC: 2.3 G/DL (ref 1.5–4.5)
GLUCOSE SERPL-MCNC: 113 MG/DL (ref 65–99)
HBA1C MFR BLD: 6.6 % (ref 4.8–5.6)
HCT VFR BLD AUTO: 41.7 % (ref 34–46.6)
HDLC SERPL-MCNC: 37 MG/DL
HGB BLD-MCNC: 13.7 G/DL (ref 11.1–15.9)
LDLC SERPL CALC-MCNC: 46 MG/DL (ref 0–99)
MCH RBC QN AUTO: 29.1 PG (ref 26.6–33)
MCHC RBC AUTO-ENTMCNC: 32.9 G/DL (ref 31.5–35.7)
MCV RBC AUTO: 89 FL (ref 79–97)
MICROALBUMIN UR-MCNC: 41.2 UG/ML
PLATELET # BLD AUTO: 260 X10E3/UL (ref 150–450)
POTASSIUM SERPL-SCNC: 4.1 MMOL/L (ref 3.5–5.2)
PROT SERPL-MCNC: 6.7 G/DL (ref 6–8.5)
RBC # BLD AUTO: 4.7 X10E6/UL (ref 3.77–5.28)
SODIUM SERPL-SCNC: 142 MMOL/L (ref 134–144)
TRIGL SERPL-MCNC: 92 MG/DL (ref 0–149)
VLDLC SERPL CALC-MCNC: 18 MG/DL (ref 5–40)
WBC # BLD AUTO: 7.9 X10E3/UL (ref 3.4–10.8)

## 2020-06-21 RX ORDER — ATORVASTATIN CALCIUM 10 MG/1
TABLET, FILM COATED ORAL
Qty: 90 TAB | Refills: 1 | Status: SHIPPED | OUTPATIENT
Start: 2020-06-21 | End: 2021-01-07

## 2020-07-27 ENCOUNTER — OFFICE VISIT (OUTPATIENT)
Dept: CARDIOLOGY CLINIC | Age: 75
End: 2020-07-27

## 2020-07-27 VITALS
WEIGHT: 176 LBS | OXYGEN SATURATION: 98 % | RESPIRATION RATE: 20 BRPM | HEART RATE: 73 BPM | DIASTOLIC BLOOD PRESSURE: 60 MMHG | SYSTOLIC BLOOD PRESSURE: 100 MMHG | BODY MASS INDEX: 31.18 KG/M2 | HEIGHT: 63 IN

## 2020-07-27 DIAGNOSIS — I10 ESSENTIAL HYPERTENSION: ICD-10-CM

## 2020-07-27 DIAGNOSIS — I42.2 APICAL VARIANT HYPERTROPHIC CARDIOMYOPATHY (HCC): ICD-10-CM

## 2020-07-27 DIAGNOSIS — I48.20 CHRONIC ATRIAL FIBRILLATION (HCC): Primary | ICD-10-CM

## 2020-07-27 DIAGNOSIS — I50.32 CHRONIC DIASTOLIC HEART FAILURE (HCC): ICD-10-CM

## 2020-07-27 NOTE — Clinical Note
7/27/20 Patient: Ariela Chung YOB: 1945 Date of Visit: 7/27/2020 Dotty Alcantar MD 
[place] [address] [address] [place] [address] VIA In Basket Dear Dotty Alcantar MD, Thank you for referring Ms. Torri Childs to [facility] for evaluation. My notes for this consultation are attached. If you have questions, please do not hesitate to call me. I look forward to following your patient along with you.  
 
 
Sincerely, 
 
Faina Naranjo MD

## 2020-07-27 NOTE — PROGRESS NOTES
Lola Rojas     1945       Dejon Núñez MD, [facility]  Date of Visit-7/27/2020   PCP is Trish Gan MD   [address] [place] [address] [facility] [place]  [facility]  HPI:  Lola Rojas is a 66 y.o. female   6 month f/u with hx of AF and apical hypertrophic cardiomyopathy with diastolic dysfunction. Echo in November 2019.  11/18/19   ECHO ADULT COMPLETE 11/19/2019 11/19/2019    Narrative · Left Ventricle: Normal systolic function (ejection fraction normal). Small left ventricle. Severe apical hypertrophy. Calculated left   ventricular ejection fraction is 73%. · Left Atrium: Severely dilated left atrium. · Right Atrium: Moderately dilated right atrium. · Aortic Valve: Mild aortic valve sclerosis with no significant stenosis. · Tricuspid Valve: Mild to moderate tricuspid valve regurgitation is   present. Signed by: Shekhar Martinez MD      She had blood work with Trish Gan MD that was all favorable for her DM and HTN. LDL is 46. Overall the pt states she is doing well. Pt notes that she does have some LE edema and SOB when she gets anxious or excited. She was experiencing jaw pain, but it was related to a sinus issue. Pt denies feeling any episodes of AF. Denies chest pain, syncope, has no tachycardia, palpitations or sense of arrhythmia. Assessment/Plan:     1. Chronic atrial fibrillation (HCC)  Rate control with Digoxin and Atenolol. No bleeding with Eliquis. Sounded regular today which is probably normal RR variability. EF is normal. Continue long term anticoagulation. 2. Apical variant hypertrophic cardiomyopathy ([address]  MRI done in June 2015 confirmed an apical septal wall at 20 mm with typical shape noted. Echo most recently reviewed   --overall this carries a fairly benign prognosis when confined to apex and not global.     3. Chronic diastolic heart failure (HCC)  Compensated NYHA 1-2   low dose of diuretic as needed.      4. Essential hypertension  Well-controlled on ARB, diuretic, BB. At goal , meds and possible side effects reviewed and patient denies  Key CAD CHF Meds             atorvastatin (LIPITOR) 10 mg tablet (Taking) TAKE 1 TABLET BY MOUTH EVERY DAY    valsartan-hydroCHLOROthiazide (DIOVAN-HCT) 320-25 mg per tablet (Taking) TAKE 1 TABLET BY MOUTH EVERY DAY    atenoloL (TENORMIN) 25 mg tablet (Taking) TAKE 1 TABLET BY MOUTH EVERY DAY    apixaban (ELIQUIS) 5 mg tablet (Taking) Take 1 Tab by mouth two (2) times a day. digoxin (LANOXIN) 0.125 mg tablet (Taking) Take 1 Tab by mouth daily. furosemide (LASIX) 20 mg tablet (Taking) 1 tablet daily    aspirin 81 mg tablet (Taking) Take 1 Tab by mouth daily. BP Readings from Last 6 Encounters:   07/27/20 100/60   06/05/20 126/80   03/06/20 130/60   01/27/20 122/82   09/24/19 130/72   05/20/19 130/70        Future Appointments   Date Time Provider Franchesca Godinez   1/29/2021 10:20 AM MD URSULA Smiley AMB      Patient Instructions   We will see you back in 6 months. Impression:   1. Chronic atrial fibrillation (HCC)    2. Apical variant hypertrophic cardiomyopathy ([address]    3. Chronic diastolic heart failure ([address]    4. Essential hypertension       Cardiac History:   Chronic atrial fibrillation   Diastolic CHF    ECHO 9-14-34= ef 55-60%, biatrial enlargement  ECHO 3- EF 70%, AK of apex with apical hypertrophy, mild TR,LAE, Pulm Htn    Admit Jan 2012 with asthma and May 2013; Chronic airway dz    Cardiac MRI     1. Apical hypertrophic cardiomyopathy. The apical septal wall measures 20   mm. The apical lateral and mid inferolateral wall measures 26 mm. Complete   obliteration of the LV apical cavity. Weaver like shape of the LV cavity   typical of the apical hypertrophic cardiomyopathy. Hyperdynamic left   ventricular systolic function with end-systolic cavity obliteration. 3-D   LVEF 86%.  There is no systolic anterior motion of the mitral apparatus or no   LV outflow tract obstruction. 2. Normal right ventricular size and systolic function. RVEF 60%. 3. Mild 1+ mitral regurgitation. 4. Moderate 2+ tricuspid regurgitation. 5. Normal rest myocardial perfusion on first pass perfusion imaging. 6. On EGE and LGE imaging, there is no areas myocardial enhancement to   suggest any myocardial scar, recent or old myocardial infarction,   infiltration or inflammation. There is no features of myocardial sarcoidosis   or amyloidosis. There is no features of inflammatory cardiomyopathy such as   myocarditis. 7. Normal pleura and pericardium. Trace anterior and posterior pericardial   effusion. 8. Markedly dilated RA and LA. ROS-except as noted above. . A complete cardiac and respiratory are reviewed and negative except as above ; Resp-denies wheezing  or productive cough,. Const- No unusual weight loss or fever; Neuro-no recent seizure or CVA ; GI- No BRBPR, abdom pain, bloating ; - no  hematuria   see supplement sheet, initialed and to be scanned by staff  Past Medical History:   Diagnosis Date    Apical variant hypertrophic cardiomyopathy [facility]     Chronic diastolic heart failure ([address]     HTN (hypertension)     Hx of mammogram     LVH (left ventricular hypertrophy) due to hypertensive disease     Mild pulmonary hypertension ([address] 9/13/2012    Obstructive sleep apnea (adult) (pediatric) 07-    AHI: 5.0 per hour    Pneumonia 2013 [place] [facility] reports that she quit smoking about 24 years ago. She has a 2.00 pack-year smoking history. She has never used smokeless tobacco. She reports that she does not drink alcohol or use drugs. Exam and Labs:  /60 (BP 1 Location: Left arm, BP Patient Position: Sitting)   Pulse 73   Resp 20   Ht 5' 3\" (1.6 m)   Wt 176 lb (79.8 kg)   SpO2 98%   BMI 31.18 kg/m² Constitutional:  NAD, comfortable  Head: NC,AT. Eyes: No scleral icterus. Neck:  Neck supple.  No JVD present. Throat: moist mucous membranes. Chest: Effort normal & normal respiratory excursion . Neurological: alert, conversant and oriented . Skin: Skin is not cold. No obvious systemic rash noted. Not diaphoretic. No erythema. Psychiatric:  Grossly normal mood and affect. Behavior appears normal. Extremities:  no clubbing or cyanosis. Abdomen: non distended    Lungs:breath sounds normal. No stridor. distress, wheezes or  Rales. Heart: normal rate, regular rhythm, normal S1, S2, no murmurs, rubs, clicks or gallops , PMI non displaced. Edema: Edema is trace at the ankle.   Lab Results   Component Value Date/Time    Cholesterol, total 101 06/12/2020 09:56 AM    HDL Cholesterol 37 (L) 06/12/2020 09:56 AM    LDL, calculated 46 06/12/2020 09:56 AM    Triglyceride 92 06/12/2020 09:56 AM    CHOL/HDL Ratio 5.1 (H) 05/12/2013 04:20 AM     Lab Results   Component Value Date/Time    Sodium 142 06/12/2020 09:56 AM    Potassium 4.1 06/12/2020 09:56 AM    Chloride 99 06/12/2020 09:56 AM    CO2 31 (H) 06/12/2020 09:56 AM    Anion gap 6 05/15/2013 07:10 AM    Glucose 113 (H) 06/12/2020 09:56 AM    BUN 16 06/12/2020 09:56 AM    Creatinine 0.79 06/12/2020 09:56 AM    BUN/Creatinine ratio 20 06/12/2020 09:56 AM    GFR est AA 84 06/12/2020 09:56 AM    GFR est non-AA 72 06/12/2020 09:56 AM    Calcium 9.9 06/12/2020 09:56 AM      Wt Readings from Last 3 Encounters:   07/27/20 176 lb (79.8 kg)   06/05/20 184 lb (83.5 kg)   03/06/20 173 lb (78.5 kg)      BP Readings from Last 3 Encounters:   07/27/20 100/60   06/05/20 126/80   03/06/20 130/60      Current Outpatient Medications   Medication Sig    atorvastatin (LIPITOR) 10 mg tablet TAKE 1 TABLET BY MOUTH EVERY DAY    Wixela Inhub 250-50 mcg/dose diskus inhaler INHALE 1 PUFF BY MOUTH TWICE A DAY    valsartan-hydroCHLOROthiazide (DIOVAN-HCT) 320-25 mg per tablet TAKE 1 TABLET BY MOUTH EVERY DAY    atenoloL (TENORMIN) 25 mg tablet TAKE 1 TABLET BY MOUTH EVERY DAY    apixaban (ELIQUIS) 5 mg tablet Take 1 Tab by mouth two (2) times a day.  digoxin (LANOXIN) 0.125 mg tablet Take 1 Tab by mouth daily.  albuterol (PROVENTIL HFA, VENTOLIN HFA, PROAIR HFA) 90 mcg/actuation inhaler Take 1 Puff by inhalation every six (6) hours as needed for Wheezing.  FLUTICASONE PROPIONATE (FLONASE NA) by Nasal route as needed.  fexofenadine (ALLEGRA) 180 mg tablet Take 180 mg by mouth daily as needed for Allergies.  triamcinolone acetonide (KENALOG) 0.1 % topical cream APPLY A THIN LAYER TO AFFECTED AREA(S) AS DIRECTED TWICE A DAY AS NEEDED    albuterol-ipratropium (DUO-NEB) 2.5 mg-0.5 mg/3 ml nebu 3 mL by Nebulization route four (4) times daily. (Patient taking differently: 3 mL by Nebulization route as needed.)    furosemide (LASIX) 20 mg tablet 1 tablet daily (Patient taking differently: 20 mg as needed. 1 tablet daily)    aspirin 81 mg tablet Take 1 Tab by mouth daily.  cpap machine kit by Does Not Apply route nightly.  tiotropium (SPIRIVA) 18 mcg inhalation capsule Take 1 Cap by inhalation daily. (Patient taking differently: Take 1 Cap by inhalation as needed.)     No current facility-administered medications for this visit. Impression see above.       Written by Henrik Naik, as dictated by Amie Bejarano MD.

## 2020-07-27 NOTE — PROGRESS NOTES
Visit Vitals  /60 (BP 1 Location: Left arm, BP Patient Position: Sitting)   Pulse 73   Resp 20   Ht 5' 3\" (1.6 m)   Wt 176 lb (79.8 kg)   SpO2 98%   BMI 31.18 kg/m²

## 2020-08-15 DIAGNOSIS — I48.20 CHRONIC ATRIAL FIBRILLATION (HCC): ICD-10-CM

## 2020-08-17 RX ORDER — DIGOXIN 125 MCG
0.12 TABLET ORAL DAILY
Qty: 90 TAB | Refills: 3 | Status: SHIPPED | OUTPATIENT
Start: 2020-08-17 | End: 2021-07-12

## 2020-08-17 NOTE — TELEPHONE ENCOUNTER
Request for Digoxin 0.125mg daily. Last office visit 7-27-20, next office visit 1-29-21.  Refills per verbal order from Dr. Charlie Drake.

## 2020-08-18 DIAGNOSIS — I10 ESSENTIAL HYPERTENSION: ICD-10-CM

## 2020-08-18 DIAGNOSIS — E11.9 CONTROLLED TYPE 2 DIABETES MELLITUS WITHOUT COMPLICATION, WITHOUT LONG-TERM CURRENT USE OF INSULIN (HCC): ICD-10-CM

## 2020-08-18 RX ORDER — VALSARTAN AND HYDROCHLOROTHIAZIDE 320; 25 MG/1; MG/1
TABLET, FILM COATED ORAL
Qty: 90 TAB | Refills: 1 | Status: SHIPPED | OUTPATIENT
Start: 2020-08-18 | End: 2020-11-01

## 2020-08-18 NOTE — LETTER
9/8/2020 12:40 PM 
 
Ms. Brownlee [address] [facility],[address] [address] Please call [phone] upon receiving this letter to schedule your annual appointment. These fill up quickly and you are due to be seen for this in December. Sincerely, Devan Berger MD

## 2020-10-28 DIAGNOSIS — I48.20 CHRONIC ATRIAL FIBRILLATION (HCC): ICD-10-CM

## 2020-10-30 NOTE — TELEPHONE ENCOUNTER
Request for Elqiuis 5mg BID. Last office visit 7-27-20, next office visit 1-29-21.  Refills per verbal order from Dr. Jenn Harris.

## 2020-10-31 DIAGNOSIS — I10 ESSENTIAL HYPERTENSION: ICD-10-CM

## 2020-10-31 DIAGNOSIS — E11.9 CONTROLLED TYPE 2 DIABETES MELLITUS WITHOUT COMPLICATION, WITHOUT LONG-TERM CURRENT USE OF INSULIN (HCC): ICD-10-CM

## 2020-11-01 RX ORDER — VALSARTAN AND HYDROCHLOROTHIAZIDE 320; 25 MG/1; MG/1
TABLET, FILM COATED ORAL
Qty: 90 TAB | Refills: 0 | Status: SHIPPED | OUTPATIENT
Start: 2020-11-01 | End: 2021-02-12

## 2020-11-27 ENCOUNTER — TRANSCRIBE ORDER (OUTPATIENT)
Dept: SCHEDULING | Age: 75
End: 2020-11-27

## 2020-11-27 DIAGNOSIS — Z12.31 SCREENING MAMMOGRAM FOR HIGH-RISK PATIENT: Primary | ICD-10-CM

## 2020-12-08 ENCOUNTER — HOSPITAL ENCOUNTER (OUTPATIENT)
Dept: MAMMOGRAPHY | Age: 75
Discharge: HOME | End: 2020-12-08
Attending: INTERNAL MEDICINE
Payer: MEDICARE

## 2020-12-08 DIAGNOSIS — Z12.31 SCREENING MAMMOGRAM FOR HIGH-RISK PATIENT: ICD-10-CM

## 2020-12-08 PROCEDURE — 77063 BREAST TOMOSYNTHESIS BI: CPT

## 2020-12-14 ENCOUNTER — OFFICE VISIT (OUTPATIENT)
Dept: INTERNAL MEDICINE CLINIC | Age: 75
End: 2020-12-14
Payer: MEDICARE

## 2020-12-14 VITALS
TEMPERATURE: 96 F | WEIGHT: 177 LBS | OXYGEN SATURATION: 99 % | DIASTOLIC BLOOD PRESSURE: 65 MMHG | SYSTOLIC BLOOD PRESSURE: 127 MMHG | HEART RATE: 67 BPM | RESPIRATION RATE: 16 BRPM | BODY MASS INDEX: 31.35 KG/M2

## 2020-12-14 DIAGNOSIS — Z71.89 ADVANCED CARE PLANNING/COUNSELING DISCUSSION: ICD-10-CM

## 2020-12-14 DIAGNOSIS — Z12.11 COLON CANCER SCREENING: Primary | ICD-10-CM

## 2020-12-14 DIAGNOSIS — Z00.00 MEDICARE ANNUAL WELLNESS VISIT, SUBSEQUENT: ICD-10-CM

## 2020-12-14 DIAGNOSIS — E66.9 OBESITY (BMI 30.0-34.9): ICD-10-CM

## 2020-12-14 DIAGNOSIS — Z71.89 EDUCATED ABOUT COVID-19 VIRUS INFECTION: ICD-10-CM

## 2020-12-14 DIAGNOSIS — E11.21 TYPE 2 DIABETES WITH NEPHROPATHY (HCC): ICD-10-CM

## 2020-12-14 DIAGNOSIS — I10 ESSENTIAL HYPERTENSION: ICD-10-CM

## 2020-12-14 DIAGNOSIS — G89.29 CHRONIC RIGHT SHOULDER PAIN: ICD-10-CM

## 2020-12-14 DIAGNOSIS — M25.511 CHRONIC RIGHT SHOULDER PAIN: ICD-10-CM

## 2020-12-14 DIAGNOSIS — E78.00 PURE HYPERCHOLESTEROLEMIA: ICD-10-CM

## 2020-12-14 DIAGNOSIS — Z23 ENCOUNTER FOR IMMUNIZATION: ICD-10-CM

## 2020-12-14 DIAGNOSIS — J41.0 SIMPLE CHRONIC BRONCHITIS (HCC): ICD-10-CM

## 2020-12-14 LAB
ALBUMIN SERPL-MCNC: 3.8 G/DL (ref 3.5–5)
ALBUMIN/GLOB SERPL: 1.4 {RATIO} (ref 1.1–2.2)
ALP SERPL-CCNC: 95 U/L (ref 45–117)
ALT SERPL-CCNC: 19 U/L (ref 12–78)
ANION GAP SERPL CALC-SCNC: 4 MMOL/L (ref 5–15)
AST SERPL-CCNC: 19 U/L (ref 15–37)
BILIRUB SERPL-MCNC: 0.7 MG/DL (ref 0.2–1)
BUN SERPL-MCNC: 13 MG/DL (ref 6–20)
BUN/CREAT SERPL: 17 (ref 12–20)
CALCIUM SERPL-MCNC: 8.8 MG/DL (ref 8.5–10.1)
CHLORIDE SERPL-SCNC: 103 MMOL/L (ref 97–108)
CO2 SERPL-SCNC: 36 MMOL/L (ref 21–32)
CREAT SERPL-MCNC: 0.77 MG/DL (ref 0.55–1.02)
EST. AVERAGE GLUCOSE BLD GHB EST-MCNC: 131 MG/DL
GLOBULIN SER CALC-MCNC: 2.7 G/DL (ref 2–4)
GLUCOSE SERPL-MCNC: 103 MG/DL (ref 65–100)
HBA1C MFR BLD: 6.2 % (ref 4–5.6)
POTASSIUM SERPL-SCNC: 3.4 MMOL/L (ref 3.5–5.1)
PROT SERPL-MCNC: 6.5 G/DL (ref 6.4–8.2)
SODIUM SERPL-SCNC: 143 MMOL/L (ref 136–145)

## 2020-12-14 PROCEDURE — G8752 SYS BP LESS 140: HCPCS | Performed by: INTERNAL MEDICINE

## 2020-12-14 PROCEDURE — 99214 OFFICE O/P EST MOD 30 MIN: CPT | Performed by: INTERNAL MEDICINE

## 2020-12-14 PROCEDURE — 90670 PCV13 VACCINE IM: CPT | Performed by: INTERNAL MEDICINE

## 2020-12-14 PROCEDURE — G8399 PT W/DXA RESULTS DOCUMENT: HCPCS | Performed by: INTERNAL MEDICINE

## 2020-12-14 PROCEDURE — G8427 DOCREV CUR MEDS BY ELIG CLIN: HCPCS | Performed by: INTERNAL MEDICINE

## 2020-12-14 PROCEDURE — G8510 SCR DEP NEG, NO PLAN REQD: HCPCS | Performed by: INTERNAL MEDICINE

## 2020-12-14 PROCEDURE — 1090F PRES/ABSN URINE INCON ASSESS: CPT | Performed by: INTERNAL MEDICINE

## 2020-12-14 PROCEDURE — G8417 CALC BMI ABV UP PARAM F/U: HCPCS | Performed by: INTERNAL MEDICINE

## 2020-12-14 PROCEDURE — G0009 ADMIN PNEUMOCOCCAL VACCINE: HCPCS | Performed by: INTERNAL MEDICINE

## 2020-12-14 PROCEDURE — G0439 PPPS, SUBSEQ VISIT: HCPCS | Performed by: INTERNAL MEDICINE

## 2020-12-14 PROCEDURE — G8754 DIAS BP LESS 90: HCPCS | Performed by: INTERNAL MEDICINE

## 2020-12-14 PROCEDURE — G8536 NO DOC ELDER MAL SCRN: HCPCS | Performed by: INTERNAL MEDICINE

## 2020-12-14 PROCEDURE — 1101F PT FALLS ASSESS-DOCD LE1/YR: CPT | Performed by: INTERNAL MEDICINE

## 2020-12-14 RX ORDER — ZOSTER VACCINE RECOMBINANT, ADJUVANTED 50 MCG/0.5
KIT INTRAMUSCULAR
Qty: 0.5 ML | Refills: 1 | Status: SHIPPED | OUTPATIENT
Start: 2020-12-14 | End: 2021-06-21 | Stop reason: SDUPTHER

## 2020-12-14 NOTE — ACP (ADVANCE CARE PLANNING)
Advance Care Planning     Advance Care Planning (ACP) Physician/NP/PA (Provider) Conversation    Date of ACP Conversation: 12/14/20  Persons included in Conversation:  patient  Length of ACP Conversation in minutes: <16 minutes (Non-Billable)    Authorized Decision Maker (if patient is incapable of making informed decisions):   Named in Advance Directive or Healthcare Power of         She has an advanced directive - a copy HAS NOT been provided. Reviewed DNR/DNI and patient is not interested.          Bennett Mcadams MD

## 2020-12-14 NOTE — PROGRESS NOTES
Geraldine Wright is a 66 y.o. female who was seen in clinic today (12/14/2020) for a full physical.             Assessment & Plan:     Diagnoses and all orders for this visit:    1. Colon cancer screening  -     OCCULT BLOOD IMMUNOASSAY,DIAGNOSTIC; Future    2. Medicare annual wellness visit, subsequent    3. Advanced care planning/counseling discussion    4. Educated about COVID-19 virus infection    5. Encounter for immunization  -     varicella-zoster recombinant, PF, (Shingrix, PF,) 50 mcg/0.5 mL susr injection; 0.5 ml IM once and then repeat in 2-6 months.  -     diph,Pertuss,Acell,,Tet Vac-PF (ADACEL) 2 Lf-(2.5-5-3-5 mcg)-5Lf/0.5 mL susp; 0.5 mL by IntraMUSCular route once for 1 dose. -     PNEUMOCOCCAL CONJ VACCINE 13 VALENT IM  -     ADMIN PNEUMOCOCCAL VACCINE    6. Type 2 diabetes with nephropathy ([facility]- well controlled, continue current treatment pending review of labs   -     REFERRAL TO OPHTHALMOLOGY  -     METABOLIC PANEL, COMPREHENSIVE; Future  -     HEMOGLOBIN A1C WITH EAG; Future  -      DIABETES FOOT EXAM    7. Obesity (BMI 30.0-34.9)- stable, needs improvement, I have recommended the following interventions: encourage exercise and lifestyle education regarding diet. 8. Pure hypercholesterolemia- at goal, continue current treatment   -     METABOLIC PANEL, COMPREHENSIVE; Future    9. Essential hypertension- at goal, continue current treatment pending review of labs   -     METABOLIC PANEL, COMPREHENSIVE; Future    10. Simple chronic bronchitis ([facility]- well controlled, continue current treatment     11.  Chronic right shoulder pain- worsening, differential dx reviewed, would favor more frozen shoulder at this time, but also curious about mass and if related to tendon rupture (would be bicep and likely not give these symptoms) vs something more concerning.  -     MRI SHOULDER RT WO CONT; Future      Follow-up and Dispositions    · Return in about 6 months (around 6/14/2021), or if symptoms worsen or fail to improve, for Regular follow up. Subjective:   Joycelyn Silva is here today for a full physical.      Annual Wellness Visit- Subsequent Visit    Since last visit: no changes      The following acute and/or chronic problems were addressed today:    Endocrine Review  She is seen for diabetes and obesity. Testing: is not performed. She reports medication compliance: n/a - not on medications (diet controlled). Diabetic diet compliance: compliant all of the time. Lab review: labs reviewed and discussed with patient. Cardiovascular Review  The patient has hypertension and hyperlipidemia. She reports taking medications as instructed, no medication side effects noted, home BP monitoring in range of 323'R systolic over 73'S diastolic. Diet and Lifestyle: generally follows a low fat low cholesterol diet, generally follows a low sodium diet. Labs: reviewed and discussed with patient. Pulmonary Review  The patient is being seen for COPD. Symptoms occur: infrequently. Current limitations in activity: none. Coughing when present is described as: none. Wheezing when present is described as: none. Regimen compliance: The patient reports adherence to this regimen. Will use albuterol rarely (last use was in the summer). Patient does not smoke cigarettes. .       End of Life Planning: This was discussed with her today and she has an advanced directive - a copy HAS NOT been provided. Reviewed DNR/DNI and patient is not interested.       Depression Screen:  3 most recent PHQ Screens 12/14/2020   Little interest or pleasure in doing things Not at all   Feeling down, depressed, irritable, or hopeless Not at all   Total Score PHQ 2 0   Trouble falling or staying asleep, or sleeping too much -   Feeling tired or having little energy -   Poor appetite, weight loss, or overeating -   Feeling bad about yourself - or that you are a failure or have let yourself or your family down -   Trouble concentrating on things such as school, work, reading, or watching TV -   Moving or speaking so slowly that other people could have noticed; or the opposite being so fidgety that others notice -   Thoughts of being better off dead, or hurting yourself in some way -   PHQ 9 Score -   How difficult have these problems made it for you to do your work, take care of your home and get along with others -         Fall Risk:   Fall Risk Assessment, last 12 mths 12/14/2020   Able to walk? Yes   Fall in past 12 months? No   Fall with injury? -   Number of falls in past 12 months -   Fall Risk Score -       Abuse Screen:  Abuse Screening Questionnaire 12/14/2020   Do you ever feel afraid of your partner? N   Are you in a relationship with someone who physically or mentally threatens you? N   Is it safe for you to go home? Y         Do you average more than 1 drink per night or more than 7 drinks a week:  No    On any one occasion in the past three months have you have had more than 3 drinks containing alcohol:  No    Health Maintenance:   Daily Aspirin: yes  Bone Density: done on 6/11/18 - normal  Glaucoma Screening: No: she wants to hold off due to COVID    Immunizations:   Influenza: up to date  Tetanus: script provided   Shingles: due for Shingrix - script provided  Pneumonia: up to date  Cancer screening:   Cervical: reviewed guidelines, n/a - s/p hysterectomy  Breast: reviewed guidelines, up to date  Colon: reviewed guidelines, reviewed screening modalities & elected for FOBT       Functional Ability and Level of Safety:   Hearing: Hearing is good. Cognition Screen:  Has your family/caregiver stated any concerns about your memory: no    Ambulation: with no difficulty    Activities of Daily Living: The home contains: handrails and grab bars  Patient does total self care  Exercise: moderately active    Adult Nutrition Screen:  No risk factors noted.        Patient Care Team:  Kj Bruno MD as PCP - General (Internal Medicine)  Lisa Wylie MD as PCP - [facility] Empaneled Provider  Nadine Rivas MD (Cardiology)  Wilson Zelaya MD (Sleep Medicine)  Shashi Dalal MD (Cardiology)       The following sections were reviewed & updated as appropriate: Allergies, PMH, PSH, FH, and SH. Patient Active Problem List   Diagnosis Code    Atrial fibrillation (HCC) I48.91    Mitral valve disorder I05.9    COPD (chronic obstructive pulmonary disease) (HCC) J44.9    YOLANDA (obstructive sleep apnea) G47.33    Chronic diastolic heart failure (HCC) I50.32    Apical variant hypertrophic cardiomyopathy (HCC) F32.3    Umbilical hernia without obstruction and without gangrene K42.9    HTN (hypertension) I10    Obesity (BMI 30.0-34. 9) E66.9    Pure hypercholesterolemia E78.00    Type 2 diabetes with nephropathy ([address] E11.21          Prior to Admission medications    Medication Sig Start Date End Date Taking? Authorizing Provider   valsartan-hydroCHLOROthiazide (DIOVAN-HCT) 320-25 mg per tablet TAKE 1 TABLET BY MOUTH EVERY DAY 11/1/20  Yes Lisa Wylie MD   apixaban (Eliquis) 5 mg tablet Take 1 Tab by mouth every twelve (12) hours. 10/30/20  Yes Nadine Rivas MD   digoxin (LANOXIN) 0.125 mg tablet Take 1 Tab by mouth daily. 8/17/20  Yes Nadine Rivas MD   atorvastatin (LIPITOR) 10 mg tablet TAKE 1 TABLET BY MOUTH EVERY DAY 6/21/20  Yes Lisa Wylie MD   Wixela Inhub 250-50 mcg/dose diskus inhaler INHALE 1 PUFF BY MOUTH TWICE A DAY 6/11/20  Yes Lisa Wylie MD   atenoloL (TENORMIN) 25 mg tablet TAKE 1 TABLET BY MOUTH EVERY DAY 5/8/20  Yes Nadine Rivas MD   albuterol (PROVENTIL HFA, VENTOLIN HFA, PROAIR HFA) 90 mcg/actuation inhaler Take 1 Puff by inhalation every six (6) hours as needed for Wheezing. 10/26/18  Yes Robert Garcia NP   FLUTICASONE PROPIONATE [facility] NA) by Nasal route as needed.    Yes Provider, Historical   fexofenadine (ALLEGRA) 180 mg tablet Take 180 mg by mouth daily as needed for Allergies. Yes Provider, Historical   triamcinolone acetonide (KENALOG) 0.1 % topical cream APPLY A THIN LAYER TO AFFECTED AREA(S) AS DIRECTED TWICE A DAY AS NEEDED 5/17/17  Yes Florentino Brunson MD   albuterol-ipratropium (DUO-NEB) 2.5 mg-0.5 mg/3 ml nebu 3 mL by Nebulization route four (4) times daily. Patient taking differently: 3 mL by Nebulization route as needed. 5/10/16  Yes Florentino Brunson MD   furosemide (LASIX) 20 mg tablet 1 tablet daily  Patient taking differently: 20 mg as needed. 1 tablet daily 7/14/14  Yes Florentino Brunson MD   aspirin 81 mg tablet Take 1 Tab by mouth daily. 1/9/14  Yes Florentino Brunson MD   cpap machine kit by Does Not Apply route nightly. Yes Provider, Historical          Allergies   Allergen Reactions    Cetirizine Hives     Other reaction(s): Hives    Amlodipine Swelling     lower extremity swelling    Coreg [Carvedilol] Other (comments)     sweating    Erythromycin Hives    Verapamil Other (comments)     Patient unable to take verapamil for rate control secondary to peripheral edema/maliase/feeling of being high. Review of Systems   Constitutional: Negative for chills and fever. Respiratory: Negative for cough and shortness of breath. Cardiovascular: Negative for chest pain and palpitations. Gastrointestinal: Negative for abdominal pain, blood in stool, constipation, diarrhea, heartburn, nausea and vomiting. Musculoskeletal: Positive for joint pain (R shoulder pain). Negative for myalgias. Neurological: Negative for tingling, focal weakness and headaches. Endo/Heme/Allergies: Does not bruise/bleed easily. Psychiatric/Behavioral: Negative for depression. The patient is not nervous/anxious and does not have insomnia. Objective:   Physical Exam  Constitutional:       General: She is not in acute distress. Appearance: Normal appearance.    Eyes:      Conjunctiva/sclera: Conjunctivae normal. Cardiovascular:      Rate and Rhythm: Regular rhythm. Heart sounds: No murmur. Pulmonary:      Effort: Pulmonary effort is normal.      Breath sounds: Normal breath sounds. No decreased breath sounds or wheezing. Abdominal:      General: Bowel sounds are normal.      Palpations: Abdomen is soft. Tenderness: There is no abdominal tenderness. Musculoskeletal:      Right shoulder: She exhibits decreased range of motion (limited to 80 degree flexion and extension) and tenderness (frontal aspect). She exhibits no swelling, no deformity, no spasm and normal strength. Arms:       Right lower leg: No edema. Left lower leg: No edema. Neurological:      Comments:   Left Foot:   Visual Exam: normal    Pulse DP: 1+ (weak)   Filament test: normal sensation   Right Foot:   Visual Exam: normal  and nail hypertrophy- 1st nail   Pulse DP: 1+ (weak)   Filament test: normal sensation    Psychiatric:         Mood and Affect: Mood and affect normal.         Behavior: Behavior normal.            Visit Vitals  /65   Pulse 67   Temp (!) 96 °F (35.6 °C) (Temporal)   Wt 177 lb (80.3 kg)   SpO2 99%   BMI 31.35 kg/m²          Advised her to call back or return to office if symptoms worsen/change/persist.  Discussed expected course/resolution/complications of diagnosis in detail with patient. Medication risks/benefits/costs/interactions/alternatives discussed with patient. She was given an after visit summary which includes diagnoses, current medications, & vitals. She expressed understanding with the diagnosis and plan. Aspects of this note may have been generated using voice recognition software. Despite editing, there may be some syntax errors.        Marce Keene MD

## 2020-12-14 NOTE — PATIENT INSTRUCTIONS
Medicare Wellness Visit, Female The best way to live healthy is to have a lifestyle where you eat a well-balanced diet, exercise regularly, limit alcohol use, and quit all forms of tobacco/nicotine, if applicable. Regular preventive services are another way to keep healthy. Preventive services (vaccines, screening tests, monitoring & exams) can help personalize your care plan, which helps you manage your own care. Screening tests can find health problems at the earliest stages, when they are easiest to treat. [facility] follows the current, evidence-based guidelines published by the [facility] Ravinder Garcia ([facility]) when recommending preventive services for our patients. Because we follow these guidelines, sometimes recommendations change over time as research supports it. (For example, mammograms used to be recommended annually. Even though Medicare will still pay for an annual mammogram, the newer guidelines recommend a mammogram every two years for women of average risk). Of course, you and your doctor may decide to screen more often for some diseases, based on your risk and your co-morbidities (chronic disease you are already diagnosed with). Preventive services for you include: - Medicare offers their members a free annual wellness visit, which is time for you and your primary care provider to discuss and plan for your preventive service needs. Take advantage of this benefit every year! 
-All adults over the age of 72 should receive the recommended pneumonia vaccines. Current USPSTF guidelines recommend a series of two vaccines for the best pneumonia protection.  
-All adults should have a flu vaccine yearly and a tetanus vaccine every 10 years.  
-All adults age 48 and older should receive the shingles vaccines (series of two vaccines). -All adults age 38-68 who are overweight should have a diabetes screening test once every three years. -All adults born between [birth year] and 1967 should be screened once for Hepatitis C. 
-Other screening tests and preventive services for persons with diabetes include: an eye exam to screen for diabetic retinopathy, a kidney function test, a foot exam, and stricter control over your cholesterol.  
-Cardiovascular screening for adults with routine risk involves an electrocardiogram (ECG) at intervals determined by your doctor.  
-Colorectal cancer screenings should be done for adults age 54-65 with no increased risk factors for colorectal cancer. There are a number of acceptable methods of screening for this type of cancer. Each test has its own benefits and drawbacks. Discuss with your doctor what is most appropriate for you during your annual wellness visit. The different tests include: colonoscopy (considered the best screening method), a fecal occult blood test, a fecal DNA test, and sigmoidoscopy. 
 
-A bone mass density test is recommended when a woman turns 65 to screen for osteoporosis. This test is only recommended one time, as a screening. Some providers will use this same test as a disease monitoring tool if you already have osteoporosis. -Breast cancer screenings are recommended every other year for women of normal risk, age 54-69. 
-Cervical cancer screenings for women over age 72 are only recommended with certain risk factors. Here is a list of your current Health Maintenance items (your personalized list of preventive services) with a due date: 
Health Maintenance Due Topic Date Due  
 DTaP/Tdap/Td  (1 - Tdap) 06/08/1964  Shingles Vaccine (1 of 2) 06/08/1993 [address] [place] [address] Eye Exam  06/14/2018  Glaucoma Screening   06/23/2018 [address] [place] [address] Annual Well Visit  09/24/2020  Diabetic Foot Care  09/24/2020  Hemoglobin A1C    12/12/2020 Jaqui Coates 1721 What is a living will?  
A living will is a legal form you use to write down the kind of care you want at the end of your life. It is used by the health professionals who will treat you if you aren't able to decide for yourself. If you put your wishes in writing, your loved ones and others will know what kind of care you want. They won't need to guess. This can ease your mind and be helpful to others. A living will is not the same as an estate or property will. An estate will explains what you want to happen with your money and property after you die. Is a living will a legal document? A living will is a legal document. Each state has its own laws about living [place]. If you move to another state, make sure that your living will is legal in the state where you now live. Or you might use a universal form that has been approved by many states. This kind of form can sometimes be completed and stored online. Your electronic copy will then be available wherever you have a connection to the Internet. In most cases, doctors will respect your wishes even if you have a form from a different state. · You don't need an  to complete a living will. But legal advice can be helpful if your state's laws are unclear, your health history is complicated, or your family can't agree on what should be in your living will. · You can change your living will at any time. Some people find that their wishes about end-of-life care change as their health changes. · In addition to making a living will, think about completing a medical power of  form. This form lets you name the person you want to make end-of-life treatment decisions for you (your \"health care agent\") if you're not able to. Many hospitals and nursing homes will give you the forms you need to complete a living will and a medical power of . · Your living will is used only if you can't make or communicate decisions for yourself anymore.  If you become able to make decisions again, you can accept or refuse any treatment, no matter what you wrote in your living will. · Your state may offer an online registry. This is a place where you can store your living will online so the doctors and nurses who need to treat you can find it right away. What should you think about when creating a living will? Talk about your end-of-life wishes with your family members and your doctor. Let them know what you want. That way the people making decisions for you won't be surprised by your choices. Think about these questions as you make your living will: · Do you know enough about life support methods that might be used? If not, talk to your doctor so you know what might be done if you can't breathe on your own, your heart stops, or you're unable to swallow. · What things would you still want to be able to do after you receive life-support methods? Would you want to be able to walk? To speak? To eat on your own? To live without the help of machines? · If you have a choice, where do you want to be cared for? In your home? At a hospital or nursing home? · Do you want certain Muslim practices performed if you become very ill? · If you have a choice at the end of your life, where would you prefer to die? At home? In a hospital or nursing home? Somewhere else? · Would you prefer to be buried or cremated? · Do you want your organs to be donated after you die? What should you do with your living will? · Make sure that your family members and your health care agent have copies of your living will. · Give your doctor a copy of your living will to keep in your medical record. If you have more than one doctor, make sure that each one has a copy. · You may want to put a copy of your living will where it can be easily found. Where can you learn more? Go to http://www.gray.com/. Enter Q656 in the search box to learn more about \"Learning About Living [place]. \" Current as of: August 8, 2016 Content Version: 11.3 © 1663-3307 [employer]. Care instructions adapted under license by [employer] (which disclaims liability or warranty for this information). If you have questions about a medical condition or this instruction, always ask your healthcare professional. [address] any warranty or liability for your use of this information. 
 
 
 
 
  
Pneumococcal Conjugate Vaccine (PCV13): What You Need to Know Why get vaccinated? Pneumococcal conjugate vaccine (PCV13) can prevent pneumococcal disease. Pneumococcal disease refers to any illness caused by pneumococcal bacteria. These bacteria can cause many types of illnesses, including pneumonia, which is an infection of the lungs. Pneumococcal bacteria are one of the most common causes of pneumonia. Besides pneumonia, pneumococcal bacteria can also cause: 
· Ear infections · Sinus infections · Meningitis (infection of the tissue covering the brain and spinal cord) · Bacteremia (bloodstream infection) Anyone can get pneumococcal disease, but children under 3years of age, people with certain medical conditions, adults 72 years or older, and cigarette smokers are at the highest risk. Most pneumococcal infections are mild. However, some can result in long-term problems, such as brain damage or hearing loss. Meningitis, bacteremia, and pneumonia caused by pneumococcal disease can be fatal. 
PCV13 PCV13 protects against 13 types of bacteria that cause pneumococcal disease. Infants and young children usually need 4 doses of pneumococcal conjugate vaccine, at 2, 4, 6, and 15 13months of age. In some cases, a child might need fewer than 4 doses to complete PCV13 vaccination. A dose of PCV13 vaccine is also recommended for anyone 2 years or older with certain medical conditions if they did not already receive PCV13.  
This vaccine may be given to adults 72 years or older based on discussions between the patient and health care provider. Talk with your health care provider Tell your vaccine provider if the person getting the vaccine: 
· Has had an allergic reaction after a previous dose of PCV13, to an earlier pneumococcal conjugate vaccine known as PCV7, or to any vaccine containing diphtheria toxoid (for example, DTaP), or has any severe, life-threatening allergies. · In some cases, your health care provider may decide to postpone PCV13 vaccination to a future visit. People with minor illnesses, such as a cold, may be vaccinated. People who are moderately or severely ill should usually wait until they recover before getting PCV13. Your health care provider can give you more information. Risks of a vaccine reaction · Redness, swelling, pain, or tenderness where the shot is given, and fever, loss of appetite, fussiness (irritability), feeling tired, headache, and chills can happen after PCV13. [place] children may be at increased risk for seizures caused by fever after PCV13 if it is administered at the same time as inactivated influenza vaccine. Ask your health care provider for more information. People sometimes faint after medical procedures, including vaccination. Tell your provider if you feel dizzy or have vision changes or ringing in the ears. As with any medicine, there is a very remote chance of a vaccine causing a severe allergic reaction, other serious injury, or death. What if there is a serious problem? An allergic reaction could occur after the vaccinated person leaves the clinic. If you see signs of a severe allergic reaction (hives, swelling of the face and throat, difficulty breathing, a fast heartbeat, dizziness, or weakness), call 9-1-1 and get the person to the nearest hospital. 
For other signs that concern you, call your health care provider.  
Adverse reactions should be reported to the Vaccine Adverse Event Reporting System (VAERS). Your health care provider will usually file this report, or you can do it yourself. Visit the VAERS website at www.vaers. hhs.gov or call 3-494.102.8718. VAERS is only for reporting reactions, and VAERS staff do not give medical advice. The National Vaccine Injury Compensation Program 
The National Vaccine Injury Compensation Program (VICP) is a federal program that was created to compensate people who may have been injured by certain vaccines. Visit the VICP website at www.[facility].gov/vaccinecompensation or call 0-327.484.5578 to learn about the program and about filing a claim. There is a time limit to file a claim for compensation. How can I learn more? · Ask your health care provider. · Call your local or state health department. · Contact the Centers for Disease Control and Prevention (CDC): 
? Call 2-463.320.2127 (1-800-CDC-INFO) or 
? Visit CDC's website at www.cdc.gov/vaccines Vaccine Information Statement (Interim) PCV13 
10/30/2019 
[address] 171NN-35 Department [facility] and thesweetlink Centers for Disease Control and Prevention Many Vaccine Information Statements are available in [place] and other languages. See www.immunize.org/vis. Muchas hojas de información sobre vacunas están disponibles en español y en otros idiomas. Visite www.immunize.org/vis. Care instructions adapted under license by [employer] (which disclaims liability or warranty for this information).  If you have questions about a medical condition or this instruction, always ask your healthcare professional. [place] [address] any warranty or liability for your use of this information.

## 2020-12-14 NOTE — PROGRESS NOTES
Annual wellness. No AMD     Clifton Shirley  is a 66 y.o. who presents for routine immunizations. Prior to vaccine administration: Consent was obtained. Risks and adverse reactions were discussed. The patient was provided the VIS and they were given an opportunity to ask questions; all questions were addressed. She  denies any symptoms, reactions or allergies that would exclude them from being immunized today. There were no adverse reactions observed post vaccination. Patient was advised to seek medical or call the office with any questions or concerns post vaccination. Patient verbalized understanding.    Karen Ruiz RN

## 2020-12-16 NOTE — PROGRESS NOTES
Letter sent to patient. All labs are stable or at goal except for low K. Normally low/normal, no changes, but if remains low at next visit will need K supplement. DM control improved.

## 2020-12-31 ENCOUNTER — HOSPITAL ENCOUNTER (OUTPATIENT)
Dept: MRI IMAGING | Age: 75
Discharge: HOME | End: 2020-12-31
Attending: INTERNAL MEDICINE
Payer: MEDICARE

## 2020-12-31 DIAGNOSIS — G89.29 CHRONIC RIGHT SHOULDER PAIN: ICD-10-CM

## 2020-12-31 DIAGNOSIS — M25.511 CHRONIC RIGHT SHOULDER PAIN: ICD-10-CM

## 2020-12-31 PROCEDURE — 73221 MRI JOINT UPR EXTREM W/O DYE: CPT

## 2021-01-04 NOTE — PROGRESS NOTES
Results reviewed. Please call patient and notify her that her MRI is abnormal and shows tendon tear and arthritis. Needs to see ortho. If no surgery will revisit ? Mass. However, likely vascular and can evaluate during surgery.

## 2021-01-06 DIAGNOSIS — R93.89 ABNORMAL MRI: Primary | ICD-10-CM

## 2021-01-06 NOTE — PROGRESS NOTES
Call to patient, advised refer to More Turner, she requested I mail it to her, she doesn't have a pen. Referral placed.   Records faxed to Dr. Lokesh Will, awaiting confirmation

## 2021-01-07 RX ORDER — ATORVASTATIN CALCIUM 10 MG/1
TABLET, FILM COATED ORAL
Qty: 90 TAB | Refills: 1 | Status: SHIPPED | OUTPATIENT
Start: 2021-01-07 | End: 2021-05-11 | Stop reason: SDUPTHER

## 2021-02-11 DIAGNOSIS — E11.9 CONTROLLED TYPE 2 DIABETES MELLITUS WITHOUT COMPLICATION, WITHOUT LONG-TERM CURRENT USE OF INSULIN (HCC): ICD-10-CM

## 2021-02-11 DIAGNOSIS — I10 ESSENTIAL HYPERTENSION: ICD-10-CM

## 2021-02-12 RX ORDER — VALSARTAN AND HYDROCHLOROTHIAZIDE 320; 25 MG/1; MG/1
TABLET, FILM COATED ORAL
Qty: 90 TAB | Refills: 1 | Status: SHIPPED | OUTPATIENT
Start: 2021-02-12 | End: 2021-05-11 | Stop reason: SDUPTHER

## 2021-05-01 DIAGNOSIS — I10 ESSENTIAL HYPERTENSION: ICD-10-CM

## 2021-05-01 DIAGNOSIS — I48.20 CHRONIC ATRIAL FIBRILLATION (HCC): ICD-10-CM

## 2021-05-04 RX ORDER — ATENOLOL 25 MG/1
25 TABLET ORAL DAILY
Qty: 90 TAB | Refills: 0 | Status: SHIPPED | OUTPATIENT
Start: 2021-05-04 | End: 2021-05-19 | Stop reason: SDUPTHER

## 2021-05-11 DIAGNOSIS — I10 ESSENTIAL HYPERTENSION: ICD-10-CM

## 2021-05-11 DIAGNOSIS — E11.9 CONTROLLED TYPE 2 DIABETES MELLITUS WITHOUT COMPLICATION, WITHOUT LONG-TERM CURRENT USE OF INSULIN (HCC): ICD-10-CM

## 2021-05-11 DIAGNOSIS — J44.1 CHRONIC OBSTRUCTIVE PULMONARY DISEASE WITH ACUTE EXACERBATION (HCC): ICD-10-CM

## 2021-05-11 NOTE — TELEPHONE ENCOUNTER
Requested Prescriptions     Pending Prescriptions Disp Refills    atorvastatin (LIPITOR) 10 mg tablet 90 Tab 1    valsartan-hydroCHLOROthiazide (DIOVAN-HCT) 320-25 mg per tablet 90 Tab 1    albuterol (PROVENTIL HFA, VENTOLIN HFA, PROAIR HFA) 90 mcg/actuation inhaler 1 Inhaler 1     Sig: Take 1 Puff by inhalation every six (6) hours as needed for Wheezing.     fluticasone propion-salmeteroL (Advair Diskus) 250-50 mcg/dose diskus inhaler 180 Each 0     Sig: TAKE 1 PUFF BY MOUTH TWICE A DAY    fluticasone propion-salmeteroL (Carloyn Moment) 250-50 mcg/dose diskus inhaler 180 Each 1           Also Requesting prescription for \" BD Single Use Swab \"           [address] [facility], . [place]

## 2021-05-13 RX ORDER — ALBUTEROL SULFATE 90 UG/1
1 AEROSOL, METERED RESPIRATORY (INHALATION)
Qty: 1 INHALER | Refills: 1 | Status: SHIPPED | OUTPATIENT
Start: 2021-05-13

## 2021-05-13 RX ORDER — FLUTICASONE PROPIONATE AND SALMETEROL 250; 50 UG/1; UG/1
POWDER RESPIRATORY (INHALATION)
Qty: 180 EACH | Refills: 0 | OUTPATIENT
Start: 2021-05-13

## 2021-05-13 RX ORDER — ATORVASTATIN CALCIUM 10 MG/1
TABLET, FILM COATED ORAL
Qty: 90 TAB | Refills: 1 | Status: SHIPPED | OUTPATIENT
Start: 2021-05-13 | End: 2021-07-12

## 2021-05-13 RX ORDER — FLUTICASONE PROPIONATE AND SALMETEROL 250; 50 UG/1; UG/1
POWDER RESPIRATORY (INHALATION)
Qty: 180 EACH | Refills: 1 | Status: SHIPPED | OUTPATIENT
Start: 2021-05-13 | End: 2022-04-12

## 2021-05-13 RX ORDER — VALSARTAN AND HYDROCHLOROTHIAZIDE 320; 25 MG/1; MG/1
TABLET, FILM COATED ORAL
Qty: 90 TAB | Refills: 1 | Status: SHIPPED | OUTPATIENT
Start: 2021-05-13 | End: 2021-10-27

## 2021-05-18 RX ORDER — BLOOD SUGAR DIAGNOSTIC
STRIP MISCELLANEOUS
Qty: 110 STRIP | Refills: 1 | Status: SHIPPED | OUTPATIENT
Start: 2021-05-18 | End: 2021-10-27

## 2021-05-18 RX ORDER — BLOOD-GLUCOSE METER
EACH MISCELLANEOUS
Qty: 1 EACH | Refills: 1 | Status: SHIPPED | OUTPATIENT
Start: 2021-05-18 | End: 2022-04-12 | Stop reason: SDUPTHER

## 2021-05-18 RX ORDER — LANCETS
EACH MISCELLANEOUS
Qty: 100 EACH | Refills: 1 | Status: SHIPPED | OUTPATIENT
Start: 2021-05-18

## 2021-05-19 ENCOUNTER — TELEPHONE (OUTPATIENT)
Dept: INTERNAL MEDICINE CLINIC | Age: 76
End: 2021-05-19

## 2021-05-19 DIAGNOSIS — I48.20 CHRONIC ATRIAL FIBRILLATION (HCC): ICD-10-CM

## 2021-05-19 DIAGNOSIS — I10 ESSENTIAL HYPERTENSION: ICD-10-CM

## 2021-05-19 NOTE — TELEPHONE ENCOUNTER
Requested Prescriptions     Pending Prescriptions Disp Refills    atenoloL (TENORMIN) 25 mg tablet 90 Tablet 0     Sig: Take 1 Tablet by mouth daily.  Please schedule follow up with Dr. Jorge Recio, [facility] [address] [place]

## 2021-05-19 NOTE — TELEPHONE ENCOUNTER
Requested Prescriptions     Pending Prescriptions Disp Refills    alcohol swabs (BD Single Use Swabs Regular) padm       Sig: by Apply Externally route.              [address], [address] [place] [address]

## 2021-05-19 NOTE — TELEPHONE ENCOUNTER
[address], [address] (Pharmacy) [phone]     Calling to check on their request for BD single use alcohol swabs. This would be a new script.

## 2021-05-20 RX ORDER — ATENOLOL 25 MG/1
25 TABLET ORAL DAILY
Qty: 90 TABLET | Refills: 1 | Status: SHIPPED | OUTPATIENT
Start: 2021-05-20 | End: 2021-08-02

## 2021-05-20 RX ORDER — ISOPROPYL ALCOHOL 70 ML/100ML
SWAB TOPICAL
Qty: 100 PAD | Refills: 1 | Status: SHIPPED | OUTPATIENT
Start: 2021-05-20 | End: 2022-06-27

## 2021-05-20 NOTE — TELEPHONE ENCOUNTER
Future Appointments   Date Time Provider Franchesca Godinez   6/21/2021 10:00 AM Macrina Pulido MD [facility] RACHEL CHAUDHRY   12/14/2021 10:30 AM Macrina Pulido MD [facility] RACHEL RIBEIRO AMB

## 2021-05-27 ENCOUNTER — TELEPHONE (OUTPATIENT)
Dept: FAMILY MEDICINE CLINIC | Age: 76
End: 2021-05-27

## 2021-06-09 DIAGNOSIS — I48.20 CHRONIC ATRIAL FIBRILLATION (HCC): ICD-10-CM

## 2021-06-09 NOTE — TELEPHONE ENCOUNTER
Requested Prescriptions     Signed Prescriptions Disp Refills    apixaban (Eliquis) 5 mg tablet 180 Tablet 0     Sig: Take 1 Tablet by mouth every twelve (12) hours. Please schedule follow up with Dr. Berenice López to receive further refills.      Authorizing Provider: Donavan Gill     Ordering User: Conrado Suazo     Verbal order per Dr. Berenice López.

## 2021-06-10 ENCOUNTER — TELEPHONE (OUTPATIENT)
Dept: INTERNAL MEDICINE CLINIC | Age: 76
End: 2021-06-10

## 2021-06-10 NOTE — TELEPHONE ENCOUNTER
Patient states both of her legs and ankles are swollen, especially the right. There are no appointments available tomorrow.

## 2021-06-11 ENCOUNTER — OFFICE VISIT (OUTPATIENT)
Dept: INTERNAL MEDICINE CLINIC | Age: 76
End: 2021-06-11
Payer: MEDICARE

## 2021-06-11 VITALS
TEMPERATURE: 97.6 F | DIASTOLIC BLOOD PRESSURE: 79 MMHG | SYSTOLIC BLOOD PRESSURE: 149 MMHG | OXYGEN SATURATION: 99 % | BODY MASS INDEX: 30.44 KG/M2 | RESPIRATION RATE: 16 BRPM | HEIGHT: 63 IN | HEART RATE: 60 BPM | WEIGHT: 171.8 LBS

## 2021-06-11 DIAGNOSIS — I50.32 CHRONIC DIASTOLIC HEART FAILURE (HCC): ICD-10-CM

## 2021-06-11 DIAGNOSIS — R06.02 SOB (SHORTNESS OF BREATH): ICD-10-CM

## 2021-06-11 DIAGNOSIS — M79.89 LEG SWELLING: Primary | ICD-10-CM

## 2021-06-11 DIAGNOSIS — I10 ESSENTIAL HYPERTENSION, BENIGN: ICD-10-CM

## 2021-06-11 DIAGNOSIS — I48.20 CHRONIC ATRIAL FIBRILLATION (HCC): ICD-10-CM

## 2021-06-11 DIAGNOSIS — E11.21 TYPE 2 DIABETES WITH NEPHROPATHY (HCC): ICD-10-CM

## 2021-06-11 DIAGNOSIS — I87.2 VENOUS STASIS DERMATITIS OF BOTH LOWER EXTREMITIES: Primary | ICD-10-CM

## 2021-06-11 PROCEDURE — 1090F PRES/ABSN URINE INCON ASSESS: CPT | Performed by: INTERNAL MEDICINE

## 2021-06-11 PROCEDURE — G8536 NO DOC ELDER MAL SCRN: HCPCS | Performed by: INTERNAL MEDICINE

## 2021-06-11 PROCEDURE — G8754 DIAS BP LESS 90: HCPCS | Performed by: INTERNAL MEDICINE

## 2021-06-11 PROCEDURE — G8753 SYS BP > OR = 140: HCPCS | Performed by: INTERNAL MEDICINE

## 2021-06-11 PROCEDURE — G8417 CALC BMI ABV UP PARAM F/U: HCPCS | Performed by: INTERNAL MEDICINE

## 2021-06-11 PROCEDURE — G8510 SCR DEP NEG, NO PLAN REQD: HCPCS | Performed by: INTERNAL MEDICINE

## 2021-06-11 PROCEDURE — G8427 DOCREV CUR MEDS BY ELIG CLIN: HCPCS | Performed by: INTERNAL MEDICINE

## 2021-06-11 PROCEDURE — G8399 PT W/DXA RESULTS DOCUMENT: HCPCS | Performed by: INTERNAL MEDICINE

## 2021-06-11 PROCEDURE — 1101F PT FALLS ASSESS-DOCD LE1/YR: CPT | Performed by: INTERNAL MEDICINE

## 2021-06-11 PROCEDURE — 99214 OFFICE O/P EST MOD 30 MIN: CPT | Performed by: INTERNAL MEDICINE

## 2021-06-11 RX ORDER — FUROSEMIDE 20 MG/1
20 TABLET ORAL
Qty: 90 TABLET | Refills: 1 | Status: SHIPPED | OUTPATIENT
Start: 2021-06-11

## 2021-06-11 NOTE — PROGRESS NOTES
HPI:  Nemo Barajas is a 64y.o. year old female who is here for a several day history of increasing edema in both of her lower legs. She tries to do stretching exercises regularly and she felt tightness and stiffness in both lower legs. Minimal discomfort. No pain with walking. No claudication. No numbness, tingling, or weakness. No bowel or bladder changes. Her blood sugars have been under good control and she checked it this morning. She is not making a record of those. She denies any true PND or orthopnea. No chest pains. No bleeding. No night sweats. No trauma to her legs that she is aware of. She has an appointment scheduled for 2 weeks from now with Dr. Waqas Haq. Past Medical History:   Diagnosis Date    Apical variant hypertrophic cardiomyopathy ([facility]     Chronic diastolic heart failure (HCC)     HTN (hypertension)     Hx of mammogram     LVH (left ventricular hypertrophy) due to hypertensive disease     Mild pulmonary hypertension ([facility] 9/13/2012    Obstructive sleep apnea (adult) (pediatric) 07-    AHI: 5.0 per hour    Pneumonia 2013 admit [facility]       Past Surgical History:   Procedure Laterality Date    HX HYSTERECTOMY      oophorectomy? Prior to Admission medications    Medication Sig Start Date End Date Taking? Authorizing Provider   furosemide (Lasix) 20 mg tablet Take 1 Tablet by mouth daily as needed (edema). 1 tablet daily 6/11/21  Yes Yang Vance MD   apixaban (Eliquis) 5 mg tablet Take 1 Tablet by mouth every twelve (12) hours. Please schedule follow up with Dr. Antoine Martin to receive further refills. 6/9/21  Yes Lori Ruiz MD   alcohol swabs (BD Single Use Swabs Regular) padm Use as directed once/day 5/20/21  Yes Kerwin Shin MD   atenoloL (TENORMIN) 25 mg tablet Take 1 Tablet by mouth daily. 5/20/21  Yes Kerwin Shin MD   Blood-Glucose Meter (Accu-Chek Laney Plus Meter) misc Use as directed.   Dx: E11.21 5/18/21  Yes Waqas Haq Demetrius Lal MD   lancets (Accu-Chek Softclix Lancets) misc Use as directed. Dx: E11.21 5/18/21  Yes Tasia Elena MD   glucose blood VI test strips (Accu-Chek Laney Plus test strp) strip Use as directed. Dx: E11.21 5/18/21  Yes Tasia Elena MD   atorvastatin (LIPITOR) 10 mg tablet TAKE 1 TABLET BY MOUTH EVERY DAY 5/13/21  Yes Tasia Elena MD   valsartan-hydroCHLOROthiazide (DIOVAN-HCT) 320-25 mg per tablet TAKE 1 TABLET BY MOUTH EVERY DAY 5/13/21  Yes Tasia Elena MD   albuterol (PROVENTIL HFA, VENTOLIN HFA, PROAIR HFA) 90 mcg/actuation inhaler Take 1 Puff by inhalation every six (6) hours as needed for Wheezing. 5/13/21  Yes Tasia Elena MD   fluticasone propion-salmeteroL (Wixela Inhub) 250-50 mcg/dose diskus inhaler INHALE 1 PUFF BY MOUTH TWICE A DAY 5/13/21  Yes Tasia Elena MD   digoxin (LANOXIN) 0.125 mg tablet Take 1 Tab by mouth daily. 8/17/20  Yes Juan M Colin MD   FLUTICASONE PROPIONATE (FLONASE NA) by Nasal route as needed. Yes Provider, Historical   fexofenadine (ALLEGRA) 180 mg tablet Take 180 mg by mouth daily as needed for Allergies. Yes Provider, Historical   triamcinolone acetonide (KENALOG) 0.1 % topical cream APPLY A THIN LAYER TO AFFECTED AREA(S) AS DIRECTED TWICE A DAY AS NEEDED 5/17/17  Yes Tasia Elena MD   albuterol-ipratropium (DUO-NEB) 2.5 mg-0.5 mg/3 ml nebu 3 mL by Nebulization route four (4) times daily. Patient taking differently: 3 mL by Nebulization route as needed. 5/10/16  Yes Tasia Elena MD   aspirin 81 mg tablet Take 1 Tab by mouth daily. 1/9/14  Yes Tasia Elena MD   cpap machine kit by Does Not Apply route nightly. Yes Provider, Historical   varicella-zoster recombinant, PF, (Shingrix, PF,) 50 mcg/0.5 mL susr injection 0.5 ml IM once and then repeat in 2-6 months.   Patient not taking: Reported on 6/11/2021 12/14/20   Tasia Elena MD   furosemide (LASIX) 20 mg tablet 1 tablet daily  Patient not taking: Reported on 2021  Maxine Drew MD       Social History     Socioeconomic History    Marital status:      Spouse name: Not on file    Number of children: Not on file    Years of education: Not on file    Highest education level: Not on file   Occupational History    Not on file   Tobacco Use    Smoking status: Former Smoker     Packs/day: 0.25     Years: 8.00     Pack years: 2.00     Quit date: 1996     Years since quittin.3    Smokeless tobacco: Never Used   Substance and Sexual Activity    Alcohol use: No    Drug use: No    Sexual activity: Yes     Partners: Male   Other Topics Concern    Not on file   Social History Narrative    Not on file     Social Determinants of Health     Financial Resource Strain:     Difficulty of Paying Living Expenses:    Food Insecurity:     Worried About Running Out of Food in the Last Year:     [address] in the Last Year:    Transportation Needs:     Lack of Transportation (Medical):      Lack of Transportation (Non-Medical):    Physical Activity:     Days of Exercise per Week:     Minutes of Exercise per Session:    Stress:     Feeling of Stress :    Social Connections:     Frequency of Communication with Friends and Family:     Frequency of Social Gatherings with Friends and Family:     Attends Nondenominational Services:     Active Member of Clubs or Organizations:     Attends Club or Organization Meetings:     Marital Status:    Intimate Partner Violence:     Fear of Current or Ex-Partner:     Emotionally Abused:     Physically Abused:     Sexually Abused:           ROS  Per HPI    Visit Vitals  BP (!) 149/79   Pulse 60   Temp 97.6 °F (36.4 °C) (Temporal)   Resp 16   Ht 5' 3\" (1.6 m)   Wt 171 lb 12.8 oz (77.9 kg)   SpO2 99%   BMI 30.43 kg/m²         Physical Exam   Physical Examination: General appearance - alert, well appearing, and in no distress  Chest - clear to auscultation, no wheezes, rales or rhonchi, symmetric air entry  Heart - normal rate and regular rhythm  Abdomen - soft, nontender, nondistended, no masses or organomegaly  Neurological - alert, oriented, normal speech, no focal findings or movement disorder noted  Musculoskeletal - no joint tenderness, deformity or swelling  Extremities -has venous stasis changes with slight erythema of the right anterior shin. There is 2+ pitting edema in both lower extremities. No calf tenderness. Negative Homans' sign. No popliteal tenderness. Pulses are excellent. Assessment/Plan:  Diagnoses and all orders for this visit:    1. Venous stasis dermatitis of both lower extremities -  will treat with low-dose Lasix. We will also use compression stockings and elevation. Will check electrolytes and a BNP today. She will let me know next week if not improving. 2. Essential hypertension, benign-blood pressure slightly elevated today. Will monitor with the addition of the Lasix. -     furosemide (Lasix) 20 mg tablet; Take 1 Tablet by mouth daily as needed (edema). 1 tablet daily    3. Chronic diastolic heart failure (HCC)-appears stable. Will check labs for Lanoxin level. -     furosemide (Lasix) 20 mg tablet; Take 1 Tablet by mouth daily as needed (edema). 1 tablet daily  -     DIGOXIN; Future  -     LIPID PANEL; Future  -     NT-PRO BNP; Future  -     HEMOGLOBIN A1C WITH EAG; Future  -     CBC WITH AUTOMATED DIFF; Future  -     METABOLIC PANEL, COMPREHENSIVE; Future    4. Type 2 diabetes with nephropathy (HCC)-appears controlled. Check A1c today. -     furosemide (Lasix) 20 mg tablet; Take 1 Tablet by mouth daily as needed (edema). 1 tablet daily  -     DIGOXIN; Future  -     LIPID PANEL; Future  -     NT-PRO BNP; Future  -     HEMOGLOBIN A1C WITH EAG; Future  -     CBC WITH AUTOMATED DIFF; Future  -     METABOLIC PANEL, COMPREHENSIVE; Future    5. Chronic atrial fibrillation (HCC)-continue anticoagulants.     6. SOB (shortness of breath)  -     NT-PRO BNP; Future              Advised her to call back or return to office if symptoms worsen/change/persist.  Discussed expected course/resolution/complications of diagnosis in detail with patient. Medication risks/benefits/costs/interactions/alternatives discussed with patient. She was given an after visit summary which includes diagnoses, current medications, & vitals. She expressed understanding with the diagnosis and plan.

## 2021-06-11 NOTE — PATIENT INSTRUCTIONS
Leg and Ankle Edema: Care Instructions  Your Care Instructions  Swelling in the legs, ankles, and feet is called edema. It is common after you sit or stand for a while. Long plane flights or car rides often cause swelling in the legs and feet. You may also have swelling if you have to stand for long periods of time at your job. Problems with the veins in the legs (varicose veins) and changes in hormones can also cause swelling. Sometimes the swelling in the ankles and feet is caused by a more serious problem, such as heart failure, infection, blood clots, or liver or kidney disease. Follow-up care is a key part of your treatment and safety. Be sure to make and go to all appointments, and call your doctor if you are having problems. It's also a good idea to know your test results and keep a list of the medicines you take. How can you care for yourself at home? · If your doctor gave you medicine, take it as prescribed. Call your doctor if you think you are having a problem with your medicine. · Whenever you are resting, raise your legs up. Try to keep the swollen area higher than the level of your heart. · Take breaks from standing or sitting in one position. ? Walk around to increase the blood flow in your lower legs. ? Move your feet and ankles often while you stand, or tighten and relax your leg muscles. · Wear support stockings. Put them on in the morning, before swelling gets worse. · Eat a balanced diet. Lose weight if you need to. · Limit the amount of salt (sodium) in your diet. Salt holds fluid in the body and may increase swelling. When should you call for help? Call 911 anytime you think you may need emergency care. For example, call if:    · You have symptoms of a blood clot in your lung (called a pulmonary embolism). These may include:  ? Sudden chest pain. ? Trouble breathing. ? Coughing up blood.    Call your doctor now or seek immediate medical care if:    · You have signs of a blood clot, such as:  ? Pain in your calf, back of the knee, thigh, or groin. ? Redness and swelling in your leg or groin.     · You have symptoms of infection, such as:  ? Increased pain, swelling, warmth, or redness. ? Red streaks or pus. ? A fever. Watch closely for changes in your health, and be sure to contact your doctor if:    · Your swelling is getting worse.     · You have new or worsening pain in your legs.     · You do not get better as expected. Where can you learn more? Go to http://www.gray.com/  Enter Z289 in the search box to learn more about \"Leg and Ankle Edema: Care Instructions. \"  Current as of: February 26, 2020               Content Version: 12.8  © 2006-2021 [employer]. Care instructions adapted under license by [employer] (which disclaims liability or warranty for this information). If you have questions about a medical condition or this instruction, always ask your healthcare professional. [place] [address] any warranty or liability for your use of this information.

## 2021-06-11 NOTE — TELEPHONE ENCOUNTER
Attempted to reach patient via phone, unsucessful. Left detailed message regarding her appointment for doppler today, [place], arrive 5:30. Requested she return call if any questions.

## 2021-06-11 NOTE — PROGRESS NOTES
Call to patient, advised need to schedule doppler today. She requests [facility] or [facility]. Called scheduling, spoke to Evelyne. Nothing available at [facility] or [facility] today. SP available. Scheduled for 6:00pm today, arrive 5:30, no prep. Bring insurance care, 's license.

## 2021-06-11 NOTE — TELEPHONE ENCOUNTER
RC to patient, bilateral leg swelling R>L, since Tuesday. She can't really tell if it's warm or reddened. Not really painful but hard to move.   Scheduled acute visit with Dr. Belkys Miguel today at 3pm.

## 2021-06-21 ENCOUNTER — OFFICE VISIT (OUTPATIENT)
Dept: INTERNAL MEDICINE CLINIC | Age: 76
End: 2021-06-21
Payer: MEDICARE

## 2021-06-21 VITALS
SYSTOLIC BLOOD PRESSURE: 101 MMHG | HEIGHT: 63 IN | BODY MASS INDEX: 29.41 KG/M2 | WEIGHT: 166 LBS | RESPIRATION RATE: 22 BRPM | OXYGEN SATURATION: 99 % | TEMPERATURE: 97.1 F | DIASTOLIC BLOOD PRESSURE: 65 MMHG | HEART RATE: 90 BPM

## 2021-06-21 DIAGNOSIS — Z23 ENCOUNTER FOR IMMUNIZATION: ICD-10-CM

## 2021-06-21 DIAGNOSIS — E78.00 PURE HYPERCHOLESTEROLEMIA: ICD-10-CM

## 2021-06-21 DIAGNOSIS — E66.3 OVERWEIGHT: ICD-10-CM

## 2021-06-21 DIAGNOSIS — I50.32 CHRONIC DIASTOLIC HEART FAILURE (HCC): ICD-10-CM

## 2021-06-21 DIAGNOSIS — E11.21 TYPE 2 DIABETES WITH NEPHROPATHY (HCC): Primary | ICD-10-CM

## 2021-06-21 DIAGNOSIS — I10 ESSENTIAL HYPERTENSION: ICD-10-CM

## 2021-06-21 DIAGNOSIS — J41.0 SIMPLE CHRONIC BRONCHITIS (HCC): ICD-10-CM

## 2021-06-21 DIAGNOSIS — I48.0 PAROXYSMAL ATRIAL FIBRILLATION (HCC): ICD-10-CM

## 2021-06-21 PROBLEM — E66.9 OBESITY (BMI 30.0-34.9): Status: RESOLVED | Noted: 2017-11-17 | Resolved: 2021-06-21

## 2021-06-21 PROCEDURE — G8536 NO DOC ELDER MAL SCRN: HCPCS | Performed by: INTERNAL MEDICINE

## 2021-06-21 PROCEDURE — 1101F PT FALLS ASSESS-DOCD LE1/YR: CPT | Performed by: INTERNAL MEDICINE

## 2021-06-21 PROCEDURE — G8754 DIAS BP LESS 90: HCPCS | Performed by: INTERNAL MEDICINE

## 2021-06-21 PROCEDURE — 99214 OFFICE O/P EST MOD 30 MIN: CPT | Performed by: INTERNAL MEDICINE

## 2021-06-21 PROCEDURE — G8432 DEP SCR NOT DOC, RNG: HCPCS | Performed by: INTERNAL MEDICINE

## 2021-06-21 PROCEDURE — 1090F PRES/ABSN URINE INCON ASSESS: CPT | Performed by: INTERNAL MEDICINE

## 2021-06-21 PROCEDURE — G8427 DOCREV CUR MEDS BY ELIG CLIN: HCPCS | Performed by: INTERNAL MEDICINE

## 2021-06-21 PROCEDURE — G8399 PT W/DXA RESULTS DOCUMENT: HCPCS | Performed by: INTERNAL MEDICINE

## 2021-06-21 PROCEDURE — G8417 CALC BMI ABV UP PARAM F/U: HCPCS | Performed by: INTERNAL MEDICINE

## 2021-06-21 PROCEDURE — G8752 SYS BP LESS 140: HCPCS | Performed by: INTERNAL MEDICINE

## 2021-06-21 RX ORDER — ZOSTER VACCINE RECOMBINANT, ADJUVANTED 50 MCG/0.5
KIT INTRAMUSCULAR
Qty: 0.5 ML | Refills: 1 | Status: SHIPPED | OUTPATIENT
Start: 2021-06-21 | End: 2022-11-02

## 2021-06-21 NOTE — ASSESSMENT & PLAN NOTE
well controlled, continue current treatment, due for urine studies, will defer to f/u as labs done 2 wks ago

## 2021-06-21 NOTE — ASSESSMENT & PLAN NOTE
monitored by specialist. No acute findings meriting change in the plan.   She has not seen cardiology in a while and will call to schedule a f/u

## 2021-06-21 NOTE — PROGRESS NOTES
Teresita Fonseca is a 64 y.o. female who was seen in clinic today (6/21/2021). Assessment & Plan:   Below is the assessment and plan developed based on review of pertinent history, physical exam, labs, studies, and medications. 1. Type 2 diabetes with nephropathy ([facility]  Assessment & Plan:  well controlled, continue current treatment, due for urine studies, will defer to f/u as labs done 2 wks ago  Orders:  -     REFERRAL TO OPHTHALMOLOGY  2. Essential hypertension  Assessment & Plan:  at goal, continue current treatment    3. Pure hypercholesterolemia  Assessment & Plan:  at goal, continue current treatment    4. Overweight  Assessment & Plan:  New dx, out of the obesity range, congratulated, no changes to plan   5. Simple chronic bronchitis ([facility]  Assessment & Plan:  well controlled, continue current treatment    6. Encounter for immunization  -     diph,Pertuss,Acell,,Tet Vac-PF (ADACEL) 2 Lf-(2.5-5-3-5 mcg)-5Lf/0.5 mL susp; 0.5 mL by IntraMUSCular route once for 1 dose., Normal, Disp-0.5 mL, R-0  -     varicella-zoster recombinant, PF, (Shingrix, PF,) 50 mcg/0.5 mL susr injection; 0.5 ml IM once and then repeat in 2-6 months., Normal, Disp-0.5 mL, R-1  7. Paroxysmal atrial fibrillation (HCC)  Assessment & Plan:   monitored by specialist. No acute findings meriting change in the plan. She has not seen cardiology in a while and will call to schedule a f/u  8. Chronic diastolic heart failure (HCC)  Assessment & Plan:  Unclear control, edema is improved and not sure how much is related to this vs venous insufficiency since edema (R>L), BNP elevated last check, no changes to lasix and strongly recommended scheduling f/u with cardiology. On ASA, BB, ARB    Follow-up and Dispositions    · Return in about 6 months (around 12/21/2021) for FULL PHYSICAL - 30 minutes.        Subjective/Objective:   Michael Moreno was seen today for Diabetes and CHF      Since last visit: she saw Dr Kaylee Willingham two weeks ago, swelling improved but still present. Endocrine Review  She is seen for diabetes and obesity. Testing: is not performed. She reports medication compliance: n/a - not on medications (diet controlled). Diabetic diet compliance: compliant all of the time. Lab review: labs reviewed and discussed with patient.       Cardiovascular Review  The patient has atrial fibrillation, hypertension and hyperlipidemia. She is staking lasix daily since seeing Dr Aftab Hoffmann. She reports taking medications as instructed, no medication side effects noted, home BP monitoring in range of 893'U systolic over 30'F diastolic. Diet and Lifestyle: generally follows a low fat low cholesterol diet, generally follows a low sodium diet. Labs: reviewed and discussed with patient.       Pulmonary Review  The patient is being seen for COPD. Symptoms occur: infrequently. Current limitations in activity: none. Coughing when present is described as: none. Wheezing when present is described as: none. Regimen compliance: The patient reports adherence to this regimen. Will use albuterol rarely. Prior to Admission medications    Medication Sig Start Date End Date Taking? Authorizing Provider   furosemide (Lasix) 20 mg tablet Take 1 Tablet by mouth daily as needed (edema). 1 tablet daily 6/11/21  Yes Mario Alberto Davenport MD   apixaban (Eliquis) 5 mg tablet Take 1 Tablet by mouth every twelve (12) hours. Please schedule follow up with Dr. Jesus Phipps to receive further refills. 6/9/21  Yes Myriam Panda MD   alcohol swabs (BD Single Use Swabs Regular) padm Use as directed once/day 5/20/21  Yes Papa Currie MD   atenoloL (TENORMIN) 25 mg tablet Take 1 Tablet by mouth daily. 5/20/21  Yes Papa Currie MD   Blood-Glucose Meter (Accu-Chek Laney Plus Meter) misc Use as directed. Dx: E11.21 5/18/21  Yes Papa Currie MD   lancets (Accu-Chek Softclix Lancets) misc Use as directed.   Dx: E11.21 5/18/21  Yes Papa Currie MD   glucose blood VI test strips (Accu-Chek Laney Plus test strp) strip Use as directed. Dx: E11.21 5/18/21  Yes Kerwin Shin MD   atorvastatin (LIPITOR) 10 mg tablet TAKE 1 TABLET BY MOUTH EVERY DAY 5/13/21  Yes Kerwin Shin MD   valsartan-hydroCHLOROthiazide (DIOVAN-HCT) 320-25 mg per tablet TAKE 1 TABLET BY MOUTH EVERY DAY 5/13/21  Yes Kerwin Shin MD   albuterol (PROVENTIL HFA, VENTOLIN HFA, PROAIR HFA) 90 mcg/actuation inhaler Take 1 Puff by inhalation every six (6) hours as needed for Wheezing. 5/13/21  Yes Kerwin Shin MD   fluticasone propion-salmeteroL (Wixela Inhub) 250-50 mcg/dose diskus inhaler INHALE 1 PUFF BY MOUTH TWICE A DAY 5/13/21  Yes Kerwin Shin MD   digoxin (LANOXIN) 0.125 mg tablet Take 1 Tab by mouth daily. 8/17/20  Yes Lori Ruiz MD   FLUTICASONE PROPIONATE (FLONASE NA) by Nasal route as needed. Yes Provider, Historical   fexofenadine (ALLEGRA) 180 mg tablet Take 180 mg by mouth daily as needed for Allergies. Yes Provider, Historical   triamcinolone acetonide (KENALOG) 0.1 % topical cream APPLY A THIN LAYER TO AFFECTED AREA(S) AS DIRECTED TWICE A DAY AS NEEDED 5/17/17  Yes Kerwin Shin MD   albuterol-ipratropium (DUO-NEB) 2.5 mg-0.5 mg/3 ml nebu 3 mL by Nebulization route four (4) times daily. Patient taking differently: 3 mL by Nebulization route as needed. 5/10/16  Yes Kerwin Shin MD   aspirin 81 mg tablet Take 1 Tab by mouth daily. 1/9/14  Yes Kerwin Shin MD   cpap machine kit by Does Not Apply route nightly. Yes Provider, Historical   varicella-zoster recombinant, PF, (Shingrix, PF,) 50 mcg/0.5 mL susr injection 0.5 ml IM once and then repeat in 2-6 months. Patient not taking: Reported on 6/11/2021 12/14/20   Kerwin Shin MD        Review of Systems   Constitutional: Positive for malaise/fatigue and weight loss. Respiratory: Negative for cough and shortness of breath.     Cardiovascular: Positive for leg swelling. Negative for chest pain and palpitations. Gastrointestinal: Negative for abdominal pain, constipation, diarrhea, heartburn, nausea and vomiting. Musculoskeletal: Negative for joint pain and myalgias. Skin: Negative for rash. Neurological: Negative for dizziness and headaches. Psychiatric/Behavioral: Negative for depression. The patient is not nervous/anxious and does not have insomnia. Physical Exam  Constitutional:       General: She is not in acute distress. Appearance: Normal appearance. Eyes:      Conjunctiva/sclera: Conjunctivae normal.   Cardiovascular:      Rate and Rhythm: Regular rhythm. Heart sounds: No murmur heard. Pulmonary:      Effort: Pulmonary effort is normal.      Breath sounds: Normal breath sounds. No decreased breath sounds or wheezing. Abdominal:      General: Bowel sounds are normal.      Palpations: Abdomen is soft. Tenderness: There is no abdominal tenderness. Musculoskeletal:      Right lower leg: Edema (1+ in ankles) present. Left lower leg: Edema (trace) present.    Psychiatric:         Mood and Affect: Mood and affect normal.         Behavior: Behavior normal.         Visit Vitals  /65   Pulse 90   Temp 97.1 °F (36.2 °C) (Temporal)   Resp 22   Ht 5' 3\" (1.6 m)   Wt 166 lb (75.3 kg)   SpO2 99%   BMI 29.41 kg/m²         Pavel Ingram MD

## 2021-06-21 NOTE — ASSESSMENT & PLAN NOTE
Unclear control, edema is improved and not sure how much is related to this vs venous insufficiency since edema (R>L), BNP elevated last check, no changes to lasix and strongly recommended scheduling f/u with cardiology.    On ASA, BB, ARB

## 2021-07-02 ENCOUNTER — OFFICE VISIT (OUTPATIENT)
Dept: CARDIOLOGY CLINIC | Age: 76
End: 2021-07-02
Payer: MEDICARE

## 2021-07-02 VITALS
WEIGHT: 168 LBS | HEIGHT: 63 IN | SYSTOLIC BLOOD PRESSURE: 120 MMHG | RESPIRATION RATE: 16 BRPM | DIASTOLIC BLOOD PRESSURE: 80 MMHG | OXYGEN SATURATION: 98 % | BODY MASS INDEX: 29.77 KG/M2 | HEART RATE: 69 BPM

## 2021-07-02 DIAGNOSIS — I42.2 APICAL VARIANT HYPERTROPHIC CARDIOMYOPATHY (HCC): ICD-10-CM

## 2021-07-02 DIAGNOSIS — I50.32 CHRONIC DIASTOLIC HEART FAILURE (HCC): ICD-10-CM

## 2021-07-02 DIAGNOSIS — I10 ESSENTIAL HYPERTENSION: ICD-10-CM

## 2021-07-02 DIAGNOSIS — I05.9 MITRAL VALVE DISORDER: ICD-10-CM

## 2021-07-02 DIAGNOSIS — I48.20 CHRONIC ATRIAL FIBRILLATION (HCC): Primary | ICD-10-CM

## 2021-07-02 PROCEDURE — G8536 NO DOC ELDER MAL SCRN: HCPCS | Performed by: SPECIALIST

## 2021-07-02 PROCEDURE — G8754 DIAS BP LESS 90: HCPCS | Performed by: SPECIALIST

## 2021-07-02 PROCEDURE — G8427 DOCREV CUR MEDS BY ELIG CLIN: HCPCS | Performed by: SPECIALIST

## 2021-07-02 PROCEDURE — 93000 ELECTROCARDIOGRAM COMPLETE: CPT | Performed by: SPECIALIST

## 2021-07-02 PROCEDURE — G8752 SYS BP LESS 140: HCPCS | Performed by: SPECIALIST

## 2021-07-02 PROCEDURE — 99214 OFFICE O/P EST MOD 30 MIN: CPT | Performed by: SPECIALIST

## 2021-07-02 PROCEDURE — 1090F PRES/ABSN URINE INCON ASSESS: CPT | Performed by: SPECIALIST

## 2021-07-02 PROCEDURE — G8399 PT W/DXA RESULTS DOCUMENT: HCPCS | Performed by: SPECIALIST

## 2021-07-02 PROCEDURE — G8510 SCR DEP NEG, NO PLAN REQD: HCPCS | Performed by: SPECIALIST

## 2021-07-02 PROCEDURE — 1101F PT FALLS ASSESS-DOCD LE1/YR: CPT | Performed by: SPECIALIST

## 2021-07-02 PROCEDURE — G8417 CALC BMI ABV UP PARAM F/U: HCPCS | Performed by: SPECIALIST

## 2021-07-02 NOTE — PATIENT INSTRUCTIONS
Please elevate your feet above your heart for 30-60 minutes twice a day. Please wear your compression stockings 8 hours a day. Please avoid salty foods. You have been scheduled for an echocardiogram. We will call with results and see you back for an annual follow up.

## 2021-07-02 NOTE — PROGRESS NOTES
Mario Benavides     1945       Dejon Escalante MD, [facility]  Date of Visit-7/2/2021   PCP is Kathleen Morrison MD   [facility] [place]  [facility]  HPI:  Mario Benavides is a 64 y.o. 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 Echo in November 2019. 11/18/19  Dr. Ygnacio Boxer last office note reviewed. Had venostasis treated with low dose Lasix and compression stockings. Lab Results   Component Value Date/Time     (H) 05/11/2013 06:30 AM     (H) 01/27/2012 06:00 PM     (H) 04/19/2011 12:15 PM     (H) 03/28/2011 02:10 AM    NT pro-BNP 2,412 (H) 06/11/2021 03:45 PM     Pt ambulates with a cane. She reports that the swelling has gotten better in her feet. Pt notes that she has been having weakness in her right leg. Pt has received the COVID-19 vaccine. C/o FLOOD  Denies chest pain, syncope or shortness of breath at rest, has no tachycardia, palpitations or sense of arrhythmia. EKG: Atrial fibrillation   Voltage criteria for LVH  (S(V1)+R(V6) exceeds 3.50 mV). -Anteroseptal infarct -age undetermined. -ST depression   +   Extensive T-abnormality  -Secondary to hypertrophy -possible  Anterior/lateral and inferior ischemia. Assessment/Plan:     Patient Instructions   Please elevate your feet above your heart for 30-60 minutes twice a day. Please wear your compression stockings 8 hours a day. Please avoid salty foods. You have been scheduled for an echocardiogram. We will call with results and see you back for an annual follow up. 1. Chronic atrial fibrillation (HCC)  Good rate control with Digoxin and Atenolol, continue Eliquis. She is in AF though she has a very regular RR variability. 2. Apical variant hypertrophic cardiomyopathy ([address]  MRI done in June 2015 confirmed an apical septal wall at 20 mm with typical shape noted.  Echo most recently reviewed   --overall this carries a fairly benign prognosis when confined to apex and not global  EKG findings consistent with hypertrophy. 3. Chronic diastolic heart failure (HCC)  Compensated, NYHA 2. She does have some edema which is venous insufficiency. See pt instructions. 4. Essential hypertension  Well controlled. Will check echo and f/u 1 year. At goal , meds and possible side effects reviewed and patient denies  Key CAD CHF Meds             furosemide (Lasix) 20 mg tablet (Taking) Take 1 Tablet by mouth daily as needed (edema). 1 tablet daily    apixaban (Eliquis) 5 mg tablet (Taking) Take 1 Tablet by mouth every twelve (12) hours. Please schedule follow up with Dr. Alek Eric to receive further refills. atenoloL (TENORMIN) 25 mg tablet (Taking) Take 1 Tablet by mouth daily. atorvastatin (LIPITOR) 10 mg tablet (Taking) TAKE 1 TABLET BY MOUTH EVERY DAY    valsartan-hydroCHLOROthiazide (DIOVAN-HCT) 320-25 mg per tablet (Taking) TAKE 1 TABLET BY MOUTH EVERY DAY    digoxin (LANOXIN) 0.125 mg tablet (Taking) Take 1 Tab by mouth daily. aspirin 81 mg tablet (Taking) Take 1 Tab by mouth daily. BP Readings from Last 6 Encounters:   07/02/21 120/80   06/21/21 101/65   06/11/21 (!) 149/79   12/14/20 127/65   07/27/20 100/60   06/05/20 126/80        5. Mitral valve disorder  11/18/19    ECHO ADULT COMPLETE 11/19/2019 11/19/2019    Interpretation Summary  · Left Ventricle: Normal systolic function (ejection fraction normal). Small left ventricle. Severe apical hypertrophy. Calculated left ventricular ejection fraction is 73%. · Left Atrium: Severely dilated left atrium. · Right Atrium: Moderately dilated right atrium. · Aortic Valve: Mild aortic valve sclerosis with no significant stenosis. · Tricuspid Valve: Mild to moderate tricuspid valve regurgitation is present.     Signed by: Rommel Earl MD on 11/19/2019  3:49 PM  F/u in 1 year     Future Appointments   Date Time Provider Franchesca Godinez   7/15/2021 9:45 AM SAEID US 1 FLORENCE SARGENT BRIONNA   8/11/2021 10:00 AM VASCULAR, ROSETTA VERGARA BS AMB   12/14/2021 10:30 AM MD LILA Sanchez BS AMB   7/1/2022  9:40 AM Dejon Fofana MD CAVREY BS AMB      Impression:   1. Chronic atrial fibrillation (HCC)    2. Apical variant hypertrophic cardiomyopathy ([address]    3. Chronic diastolic heart failure ([address]    4. Essential hypertension    5. Mitral valve disorder       Cardiac History:   Chronic atrial fibrillation   Diastolic CHF    ECHO 7-76-32= ef 55-60%, biatrial enlargement  ECHO 3- EF 70%, AK of apex with apical hypertrophy, mild TR,LAE, Pulm Htn    Admit Jan 2012 with asthma and May 2013; Chronic airway dz    Cardiac MRI     1. Apical hypertrophic cardiomyopathy. The apical septal wall measures 20   mm. The apical lateral and mid inferolateral wall measures 26 mm. Complete   obliteration of the LV apical cavity. Currie like shape of the LV cavity   typical of the apical hypertrophic cardiomyopathy. Hyperdynamic left   ventricular systolic function with end-systolic cavity obliteration. 3-D   LVEF 86%. There is no systolic anterior motion of the mitral apparatus or no   LV outflow tract obstruction. 2. Normal right ventricular size and systolic function. RVEF 60%. 3. Mild 1+ mitral regurgitation. 4. Moderate 2+ tricuspid regurgitation. 5. Normal rest myocardial perfusion on first pass perfusion imaging. 6. On EGE and LGE imaging, there is no areas myocardial enhancement to   suggest any myocardial scar, recent or old myocardial infarction,   infiltration or inflammation. There is no features of myocardial sarcoidosis   or amyloidosis. There is no features of inflammatory cardiomyopathy such as   myocarditis. 7. Normal pleura and pericardium. Trace anterior and posterior pericardial   effusion. 8. Markedly dilated RA and LA.      Future Appointments   Date Time Provider Franchesca Godinez   12/14/2021 10:30 AM [facility] San[facility] 7/1/2022  9:40 AM Dejon Fofana MD [facility] BS AMB        ROS-except as noted above. . A complete cardiac and respiratory are reviewed and negative except as above ; Resp-denies wheezing  or productive cough,. Const- No unusual weight loss or fever; Neuro-no recent seizure or CVA ; GI- No BRBPR, abdom pain, bloating ; - no  hematuria   see supplement sheet, initialed and to be scanned by staff  Past Medical History:   Diagnosis Date    Apical variant hypertrophic [facility]     Chronic diastolic heart failure ([address]     HTN (hypertension)     Hx of mammogram     LVH (left ventricular hypertrophy) due to hypertensive disease     Mild pulmonary hypertension ([facility] 9/13/2012    Obstructive sleep apnea (adult) (pediatric) 07-    AHI: 5.0 per hour    Pneumonia 2013 admit [address] Hx= reports that she quit smoking about 25 years ago. She has a 2.00 pack-year smoking history. She has never used smokeless tobacco. She reports that she does not drink alcohol and does not use drugs. Exam and Labs:  /80   Pulse 69   Resp 16   Ht 5' 3\" (1.6 m)   Wt 168 lb (76.2 kg)   SpO2 98%   BMI 29.76 kg/m² Constitutional:  NAD, comfortable  Head: NC,AT. Eyes: No scleral icterus. Neck:  Neck supple. No JVD present. Throat: moist mucous membranes. Chest: Effort normal & normal respiratory excursion . Neurological: alert, conversant and oriented . Skin: Skin is not cold. No obvious systemic rash noted. Not diaphoretic. No erythema. Psychiatric:  Grossly normal mood and affect. Behavior appears normal. Extremities:  no clubbing or cyanosis. Abdomen: non distended    Lungs:breath sounds normal. No stridor. distress, wheezes or  Rales. Heart: normal rate, regular rhythm, normal S1, S2, no murmurs, rubs, clicks or gallops , PMI non displaced. Edema: Edema is 1+ at the ankles only.   Lab Results   Component Value Date/Time    Cholesterol, total 109 06/11/2021 03:45 PM    HDL Cholesterol 45 06/11/2021 03:45 PM    LDL, calculated 48 06/11/2021 03:45 PM    Triglyceride 80 06/11/2021 03:45 PM    CHOL/HDL Ratio 2.4 06/11/2021 03:45 PM     Lab Results   Component Value Date/Time    Sodium 142 06/11/2021 03:45 PM    Potassium 3.8 06/11/2021 03:45 PM    Chloride 106 06/11/2021 03:45 PM    CO2 30 06/11/2021 03:45 PM    Anion gap 6 06/11/2021 03:45 PM    Glucose 85 06/11/2021 03:45 PM    BUN 18 06/11/2021 03:45 PM    Creatinine 0.79 06/11/2021 03:45 PM    BUN/Creatinine ratio 23 (H) 06/11/2021 03:45 PM    GFR est AA >60 06/11/2021 03:45 PM    GFR est non-AA >60 06/11/2021 03:45 PM    Calcium 9.7 06/11/2021 03:45 PM      Wt Readings from Last 3 Encounters:   07/02/21 168 lb (76.2 kg)   06/21/21 166 lb (75.3 kg)   06/11/21 171 lb 12.8 oz (77.9 kg)      BP Readings from Last 3 Encounters:   07/02/21 120/80   06/21/21 101/65   06/11/21 (!) 149/79      Current Outpatient Medications   Medication Sig    varicella-zoster recombinant, PF, (Shingrix, PF,) 50 mcg/0.5 mL susr injection 0.5 ml IM once and then repeat in 2-6 months.  furosemide (Lasix) 20 mg tablet Take 1 Tablet by mouth daily as needed (edema). 1 tablet daily    apixaban (Eliquis) 5 mg tablet Take 1 Tablet by mouth every twelve (12) hours. Please schedule follow up with Dr. Kiara Katz to receive further refills.  alcohol swabs (BD Single Use Swabs Regular) padm Use as directed once/day    atenoloL (TENORMIN) 25 mg tablet Take 1 Tablet by mouth daily.  Blood-Glucose Meter (Accu-Chek Laney Plus Meter) misc Use as directed. Dx: E11.21    lancets (Accu-Chek Softclix Lancets) misc Use as directed. Dx: E11.21    glucose blood VI test strips (Accu-Chek Laney Plus test strp) strip Use as directed.  Dx: E11.21    atorvastatin (LIPITOR) 10 mg tablet TAKE 1 TABLET BY MOUTH EVERY DAY    valsartan-hydroCHLOROthiazide (DIOVAN-HCT) 320-25 mg per tablet TAKE 1 TABLET BY MOUTH EVERY DAY    albuterol (PROVENTIL HFA, VENTOLIN HFA, PROAIR HFA) 90 mcg/actuation inhaler Take 1 Puff by inhalation every six (6) hours as needed for Wheezing.  fluticasone propion-salmeteroL (Wixela Inhub) 250-50 mcg/dose diskus inhaler INHALE 1 PUFF BY MOUTH TWICE A DAY    digoxin (LANOXIN) 0.125 mg tablet Take 1 Tab by mouth daily.  FLUTICASONE PROPIONATE (FLONASE NA) by Nasal route as needed.  fexofenadine (ALLEGRA) 180 mg tablet Take 180 mg by mouth daily as needed for Allergies.  triamcinolone acetonide (KENALOG) 0.1 % topical cream APPLY A THIN LAYER TO AFFECTED AREA(S) AS DIRECTED TWICE A DAY AS NEEDED    aspirin 81 mg tablet Take 1 Tab by mouth daily.  cpap machine kit by Does Not Apply route nightly. No current facility-administered medications for this visit. Impression see above.       Written by Arnol Marley, as dictated by Cinthia Lilly MD.

## 2021-07-02 NOTE — Clinical Note
7/2/2021    Patient: Taco Adams   YOB: 1945   Date of Visit: 7/2/2021     William Oneill, [address] [id number]  [place]    Dear William Oneill MD,      Thank you for referring Ms. Cristina Kwon to [facility] for evaluation. My notes for this consultation are attached. If you have questions, please do not hesitate to call me. I look forward to following your patient along with you.       Sincerely,    Dot Crigler, MD

## 2021-07-10 DIAGNOSIS — I48.20 CHRONIC ATRIAL FIBRILLATION (HCC): ICD-10-CM

## 2021-07-12 RX ORDER — ATORVASTATIN CALCIUM 10 MG/1
TABLET, FILM COATED ORAL
Qty: 90 TABLET | Refills: 1 | Status: SHIPPED | OUTPATIENT
Start: 2021-07-12 | End: 2022-03-25 | Stop reason: SDUPTHER

## 2021-07-12 RX ORDER — DIGOXIN 125 MCG
0.12 TABLET ORAL DAILY
Qty: 90 TABLET | Refills: 3 | Status: SHIPPED | OUTPATIENT
Start: 2021-07-12 | End: 2022-08-11

## 2021-07-21 ENCOUNTER — HOSPITAL ENCOUNTER (OUTPATIENT)
Dept: ULTRASOUND IMAGING | Age: 76
Discharge: HOME | End: 2021-07-21
Attending: INTERNAL MEDICINE
Payer: MEDICARE

## 2021-07-21 DIAGNOSIS — M79.89 LEG SWELLING: ICD-10-CM

## 2021-07-21 PROCEDURE — 93970 EXTREMITY STUDY: CPT

## 2021-08-02 DIAGNOSIS — I10 ESSENTIAL HYPERTENSION: ICD-10-CM

## 2021-08-02 DIAGNOSIS — I48.20 CHRONIC ATRIAL FIBRILLATION (HCC): ICD-10-CM

## 2021-08-02 RX ORDER — ATENOLOL 25 MG/1
25 TABLET ORAL DAILY
Qty: 90 TABLET | Refills: 3 | Status: SHIPPED | OUTPATIENT
Start: 2021-08-02 | End: 2022-02-07 | Stop reason: SDUPTHER

## 2021-08-11 ENCOUNTER — ANCILLARY PROCEDURE (OUTPATIENT)
Dept: CARDIOLOGY CLINIC | Age: 76
End: 2021-08-11
Payer: MEDICARE

## 2021-08-11 VITALS — HEIGHT: 63 IN | WEIGHT: 168 LBS | BODY MASS INDEX: 29.77 KG/M2

## 2021-08-11 DIAGNOSIS — I48.20 CHRONIC ATRIAL FIBRILLATION (HCC): ICD-10-CM

## 2021-08-11 DIAGNOSIS — I50.32 CHRONIC DIASTOLIC HEART FAILURE (HCC): ICD-10-CM

## 2021-08-11 DIAGNOSIS — I05.9 MITRAL VALVE DISORDER: ICD-10-CM

## 2021-08-11 PROCEDURE — 93306 TTE W/DOPPLER COMPLETE: CPT | Performed by: SPECIALIST

## 2021-08-15 LAB
ECHO AO ASC DIAM: 3.13 CM
ECHO AO ROOT DIAM: 2.7 CM
ECHO AV AREA PEAK VELOCITY: 1.46 CM2
ECHO AV AREA VTI: 1.5 CM2
ECHO AV AREA/BSA PEAK VELOCITY: 0.8 CM2/M2
ECHO AV AREA/BSA VTI: 0.8 CM2/M2
ECHO AV MEAN GRADIENT: 6.91 MMHG
ECHO AV PEAK GRADIENT: 16.29 MMHG
ECHO AV PEAK VELOCITY: 201.79 CM/S
ECHO AV VTI: 30.43 CM
ECHO IVC PROX: 2.9 CM
ECHO LA AREA 4C: 47.99 CM2
ECHO LA MAJOR AXIS: 6.04 CM
ECHO LA MINOR AXIS: 3.36 CM
ECHO LA VOL 2C: 225.47 ML (ref 22–52)
ECHO LA VOL 4C: 200.73 ML (ref 22–52)
ECHO LA VOL BP: 233.58 ML (ref 22–52)
ECHO LA VOL/BSA BIPLANE: 129.77 ML/M2 (ref 16–28)
ECHO LA VOLUME INDEX A2C: 125.26 ML/M2 (ref 16–28)
ECHO LA VOLUME INDEX A4C: 111.52 ML/M2 (ref 16–28)
ECHO LV E' LATERAL VELOCITY: 9.69 CM/S
ECHO LV E' SEPTAL VELOCITY: 6.27 CM/S
ECHO LV INTERNAL DIMENSION DIASTOLIC: 4.64 CM (ref 3.9–5.3)
ECHO LV INTERNAL DIMENSION SYSTOLIC: 2.86 CM
ECHO LV IVSD: 1.33 CM (ref 0.6–0.9)
ECHO LV MASS 2D: 238.5 G (ref 67–162)
ECHO LV MASS INDEX 2D: 132.5 G/M2 (ref 43–95)
ECHO LV POSTERIOR WALL DIASTOLIC: 1.31 CM (ref 0.6–0.9)
ECHO LVOT DIAM: 1.85 CM
ECHO LVOT PEAK GRADIENT: 4.74 MMHG
ECHO LVOT PEAK VELOCITY: 108.88 CM/S
ECHO LVOT SV: 45.7 ML
ECHO LVOT VTI: 16.95 CM
ECHO RA AREA 4C: 27.33 CM2
ECHO RV INTERNAL DIMENSION: 2.89 CM
ECHO RV TAPSE: 1.05 CM (ref 1.5–2)
ECHO TV REGURGITANT MAX VELOCITY: 256.99 CM/S
ECHO TV REGURGITANT PEAK GRADIENT: 26.42 MMHG
LA VOL DISK BP: 219.63 ML (ref 22–52)

## 2021-08-16 NOTE — PROGRESS NOTES
Echocardiogram done and it shows no change. She has thickening of the heart from this apical hypertrophy that extends mainly from the apex or tip of the heart but then back to the rest of the heart. The pump function of the heart remains normal but this thickening of the heart causes her to have a stiff heart that makes her short of breath at times and dilates her atria. This is classic diastolic heart failure symptoms and she will get short of breath at times. This explains her shortness of breath if she is having some now. Overall however this is unchanged from previous and the current therapy is appropriate. Have her let us know if she gets any worsening shortness of breath or edema and will just keep her follow-up as planned  Future Appointments  12/14/2021 10:30 AM   MD LILA Royal                BS AMB  7/1/2022   9:40 AM    Dejon Fofana MD C[facility]REY              BS AMB    Current Outpatient Medications:   ·  atenoloL (TENORMIN) 25 mg tablet, Take 1 Tablet by mouth daily. , Disp: 90 Tablet, Rfl: 3  ·  digoxin (LANOXIN) 0.125 mg tablet, Take 1 Tablet by mouth daily. , Disp: 90 Tablet, Rfl: 3  ·  atorvastatin (LIPITOR) 10 mg tablet, TAKE 1 TABLET BY MOUTH EVERY DAY, Disp: 90 Tablet, Rfl: 1  ·  varicella-zoster recombinant, PF, (Shingrix, PF,) 50 mcg/0.5 mL susr injection, 0.5 ml IM once and then repeat in 2-6 months., Disp: 0.5 mL, Rfl: 1  ·  furosemide (Lasix) 20 mg tablet, Take 1 Tablet by mouth daily as needed (edema). 1 tablet daily, Disp: 90 Tablet, Rfl: 1  ·  apixaban (Eliquis) 5 mg tablet, Take 1 Tablet by mouth every twelve (12) hours. Please schedule follow up with Dr. Jeff Bai to receive further refills. , Disp: 180 Tablet, Rfl: 0  ·  alcohol swabs (BD Single Use Swabs Regular) padm, Use as directed once/day, Disp: 100 Pad, Rfl: 1  ·  Blood-Glucose Meter (Accu-Chek Laney Plus Meter) misc, Use as directed.   Dx: E11.21, Disp: 1 Each, Rfl: 1  ·  lancets (Accu-Chek Softclix Lancets) misc, Use as directed. Dx: E11.21, Disp: 100 Each, Rfl: 1  ·  glucose blood VI test strips (Accu-Chek Laney Plus test strp) strip, Use as directed. Dx: E11.21, Disp: 110 Strip, Rfl: 1  ·  valsartan-hydroCHLOROthiazide (DIOVAN-HCT) 320-25 mg per tablet, TAKE 1 TABLET BY MOUTH EVERY DAY, Disp: 90 Tab, Rfl: 1  ·  albuterol (PROVENTIL HFA, VENTOLIN HFA, PROAIR HFA) 90 mcg/actuation inhaler, Take 1 Puff by inhalation every six (6) hours as needed for Wheezing., Disp: 1 Inhaler, Rfl: 1  ·  fluticasone propion-salmeteroL (Wixela Inhub) 250-50 mcg/dose diskus inhaler, INHALE 1 PUFF BY MOUTH TWICE A DAY, Disp: 180 Each, Rfl: 1  ·  FLUTICASONE PROPIONATE (FLONASE NA), by Nasal route as needed. , Disp: , Rfl:   ·  fexofenadine (ALLEGRA) 180 mg tablet, Take 180 mg by mouth daily as needed for Allergies. , Disp: , Rfl:   ·  triamcinolone acetonide (KENALOG) 0.1 % topical cream, APPLY A THIN LAYER TO AFFECTED AREA(S) AS DIRECTED TWICE A DAY AS NEEDED, Disp: 30 g, Rfl: 0  ·  aspirin 81 mg tablet, Take 1 Tab by mouth daily. , Disp: 90 Tab, Rfl: 1  ·  cpap machine kit, by Does Not Apply route nightly.  , Disp: , Rfl:

## 2021-09-03 NOTE — PROGRESS NOTES
Two patient identifiers verified. Per MD patient called and given results. Patient is feeling well but will let us know if her SOB increases or if she has any wegiht gain, swelling or any other symptoms. Confirmed follow up. Patient verbalized understanding and denies any further questions or concerns at this time.

## 2021-10-27 DIAGNOSIS — E11.9 CONTROLLED TYPE 2 DIABETES MELLITUS WITHOUT COMPLICATION, WITHOUT LONG-TERM CURRENT USE OF INSULIN (HCC): ICD-10-CM

## 2021-10-27 DIAGNOSIS — I10 ESSENTIAL HYPERTENSION: ICD-10-CM

## 2021-10-27 RX ORDER — CALCIUM CITRATE/VITAMIN D3 200MG-6.25
TABLET ORAL
Qty: 200 STRIP | Refills: 1 | Status: SHIPPED | OUTPATIENT
Start: 2021-10-27

## 2021-10-27 RX ORDER — BLOOD-GLUCOSE METER
EACH MISCELLANEOUS
Qty: 1 KIT | Refills: 1 | Status: SHIPPED | OUTPATIENT
Start: 2021-10-27

## 2021-10-27 RX ORDER — VALSARTAN AND HYDROCHLOROTHIAZIDE 320; 25 MG/1; MG/1
TABLET, FILM COATED ORAL
Qty: 90 TABLET | Refills: 1 | Status: SHIPPED | OUTPATIENT
Start: 2021-10-27 | End: 2022-04-13

## 2021-10-27 RX ORDER — LANCETS 33 GAUGE
EACH MISCELLANEOUS
Qty: 200 EACH | Refills: 1 | Status: SHIPPED | OUTPATIENT
Start: 2021-10-27

## 2021-11-23 ENCOUNTER — TRANSCRIBE ORDER (OUTPATIENT)
Dept: SCHEDULING | Age: 76
End: 2021-11-23

## 2021-11-23 DIAGNOSIS — Z12.31 SCREENING MAMMOGRAM FOR HIGH-RISK PATIENT: Primary | ICD-10-CM

## 2021-11-23 DIAGNOSIS — I48.20 CHRONIC ATRIAL FIBRILLATION (HCC): ICD-10-CM

## 2021-11-23 NOTE — TELEPHONE ENCOUNTER
Per VO by MD.     Future Appointments   Date Time Provider Franchesca Gretchen   12/14/2021 10:30 AM MD LILA Ya BS AMB   12/17/2021  1:30 PM [facility] [place]   7/1/2022  9:40 AM Dejon Fofana MD [facility] BS AMB

## 2021-12-13 ENCOUNTER — TELEPHONE (OUTPATIENT)
Dept: INTERNAL MEDICINE CLINIC | Age: 76
End: 2021-12-13

## 2021-12-17 ENCOUNTER — HOSPITAL ENCOUNTER (OUTPATIENT)
Dept: MAMMOGRAPHY | Age: 76
Discharge: HOME | End: 2021-12-17
Attending: INTERNAL MEDICINE
Payer: MEDICARE

## 2021-12-17 DIAGNOSIS — Z12.31 SCREENING MAMMOGRAM FOR HIGH-RISK PATIENT: ICD-10-CM

## 2021-12-17 PROCEDURE — 77063 BREAST TOMOSYNTHESIS BI: CPT

## 2022-02-03 DIAGNOSIS — I10 ESSENTIAL HYPERTENSION: ICD-10-CM

## 2022-02-03 DIAGNOSIS — I48.20 CHRONIC ATRIAL FIBRILLATION (HCC): ICD-10-CM

## 2022-02-03 RX ORDER — ATENOLOL 25 MG/1
TABLET ORAL
Qty: 90 TABLET | Refills: 3 | OUTPATIENT
Start: 2022-02-03

## 2022-02-04 NOTE — TELEPHONE ENCOUNTER
Please call patient. She was a no show to f/u in Dec.  Not scheduled to RTC until June.   Needs f/u prior to sending to mail order, can send to local

## 2022-02-07 RX ORDER — ATENOLOL 25 MG/1
25 TABLET ORAL DAILY
Qty: 30 TABLET | Refills: 1 | Status: SHIPPED | OUTPATIENT
Start: 2022-02-07 | End: 2022-04-11 | Stop reason: SDUPTHER

## 2022-02-07 NOTE — TELEPHONE ENCOUNTER
Scheduled for March to follow-up on meds -   [facility] [address]  [phone]  For calling into local pharmacy

## 2022-03-19 PROBLEM — K42.9 UMBILICAL HERNIA WITHOUT OBSTRUCTION AND WITHOUT GANGRENE: Status: ACTIVE | Noted: 2017-05-17

## 2022-03-19 PROBLEM — E11.21 TYPE 2 DIABETES WITH NEPHROPATHY (HCC): Status: ACTIVE | Noted: 2018-05-17

## 2022-03-20 PROBLEM — I10 HTN (HYPERTENSION): Status: ACTIVE | Noted: 2017-11-17

## 2022-03-20 PROBLEM — E78.00 PURE HYPERCHOLESTEROLEMIA: Status: ACTIVE | Noted: 2017-11-17

## 2022-03-20 PROBLEM — E66.3 OVERWEIGHT: Status: ACTIVE | Noted: 2021-06-21

## 2022-03-21 ENCOUNTER — OFFICE VISIT (OUTPATIENT)
Dept: INTERNAL MEDICINE CLINIC | Age: 77
End: 2022-03-21
Payer: MEDICARE

## 2022-03-21 VITALS
TEMPERATURE: 97.1 F | WEIGHT: 168.4 LBS | BODY MASS INDEX: 29.84 KG/M2 | HEART RATE: 104 BPM | OXYGEN SATURATION: 98 % | DIASTOLIC BLOOD PRESSURE: 80 MMHG | SYSTOLIC BLOOD PRESSURE: 160 MMHG | RESPIRATION RATE: 20 BRPM | HEIGHT: 63 IN

## 2022-03-21 DIAGNOSIS — J41.0 SIMPLE CHRONIC BRONCHITIS (HCC): ICD-10-CM

## 2022-03-21 DIAGNOSIS — M25.511 CHRONIC RIGHT SHOULDER PAIN: ICD-10-CM

## 2022-03-21 DIAGNOSIS — E66.3 OVERWEIGHT: ICD-10-CM

## 2022-03-21 DIAGNOSIS — I10 PRIMARY HYPERTENSION: ICD-10-CM

## 2022-03-21 DIAGNOSIS — I48.20 CHRONIC ATRIAL FIBRILLATION (HCC): ICD-10-CM

## 2022-03-21 DIAGNOSIS — G89.29 CHRONIC RIGHT SHOULDER PAIN: ICD-10-CM

## 2022-03-21 DIAGNOSIS — Z11.59 NEED FOR HEPATITIS C SCREENING TEST: ICD-10-CM

## 2022-03-21 DIAGNOSIS — I50.32 CHRONIC DIASTOLIC HEART FAILURE (HCC): ICD-10-CM

## 2022-03-21 DIAGNOSIS — E78.00 PURE HYPERCHOLESTEROLEMIA: ICD-10-CM

## 2022-03-21 DIAGNOSIS — E11.21 TYPE 2 DIABETES WITH NEPHROPATHY (HCC): Primary | ICD-10-CM

## 2022-03-21 LAB
ALBUMIN SERPL-MCNC: 3.8 G/DL (ref 3.5–5)
ALBUMIN/GLOB SERPL: 1.3 {RATIO} (ref 1.1–2.2)
ALP SERPL-CCNC: 86 U/L (ref 45–117)
ALT SERPL-CCNC: 20 U/L (ref 12–78)
ANION GAP SERPL CALC-SCNC: 4 MMOL/L (ref 5–15)
AST SERPL-CCNC: 17 U/L (ref 15–37)
BILIRUB SERPL-MCNC: 0.6 MG/DL (ref 0.2–1)
BUN SERPL-MCNC: 18 MG/DL (ref 6–20)
BUN/CREAT SERPL: 23 (ref 12–20)
CALCIUM SERPL-MCNC: 9.1 MG/DL (ref 8.5–10.1)
CHLORIDE SERPL-SCNC: 103 MMOL/L (ref 97–108)
CHOLEST SERPL-MCNC: 112 MG/DL
CO2 SERPL-SCNC: 33 MMOL/L (ref 21–32)
CREAT SERPL-MCNC: 0.8 MG/DL (ref 0.55–1.02)
CREAT UR-MCNC: 194 MG/DL
DIGOXIN SERPL-MCNC: 0.7 NG/ML (ref 0.9–2)
ERYTHROCYTE [DISTWIDTH] IN BLOOD BY AUTOMATED COUNT: 12.9 % (ref 11.5–14.5)
EST. AVERAGE GLUCOSE BLD GHB EST-MCNC: 134 MG/DL
GLOBULIN SER CALC-MCNC: 3 G/DL (ref 2–4)
GLUCOSE SERPL-MCNC: 103 MG/DL (ref 65–100)
HBA1C MFR BLD: 6.3 % (ref 4–5.6)
HCT VFR BLD AUTO: 39.1 % (ref 35–47)
HCV AB SERPL QL IA: NONREACTIVE
HDLC SERPL-MCNC: 44 MG/DL
HDLC SERPL: 2.5 {RATIO} (ref 0–5)
HGB BLD-MCNC: 12 G/DL (ref 11.5–16)
LDLC SERPL CALC-MCNC: 50.2 MG/DL (ref 0–100)
MCH RBC QN AUTO: 28.6 PG (ref 26–34)
MCHC RBC AUTO-ENTMCNC: 30.7 G/DL (ref 30–36.5)
MCV RBC AUTO: 93.3 FL (ref 80–99)
MICROALBUMIN UR-MCNC: 23.5 MG/DL
MICROALBUMIN/CREAT UR-RTO: 121 MG/G (ref 0–30)
NRBC # BLD: 0 K/UL (ref 0–0.01)
NRBC BLD-RTO: 0 PER 100 WBC
PLATELET # BLD AUTO: 235 K/UL (ref 150–400)
PMV BLD AUTO: 10.2 FL (ref 8.9–12.9)
POTASSIUM SERPL-SCNC: 3.7 MMOL/L (ref 3.5–5.1)
PROT SERPL-MCNC: 6.8 G/DL (ref 6.4–8.2)
RBC # BLD AUTO: 4.19 M/UL (ref 3.8–5.2)
SODIUM SERPL-SCNC: 140 MMOL/L (ref 136–145)
TRIGL SERPL-MCNC: 89 MG/DL (ref ?–150)
VLDLC SERPL CALC-MCNC: 17.8 MG/DL
WBC # BLD AUTO: 5.8 K/UL (ref 3.6–11)

## 2022-03-21 PROCEDURE — G8510 SCR DEP NEG, NO PLAN REQD: HCPCS | Performed by: INTERNAL MEDICINE

## 2022-03-21 PROCEDURE — 99214 OFFICE O/P EST MOD 30 MIN: CPT | Performed by: INTERNAL MEDICINE

## 2022-03-21 PROCEDURE — G8754 DIAS BP LESS 90: HCPCS | Performed by: INTERNAL MEDICINE

## 2022-03-21 PROCEDURE — G8417 CALC BMI ABV UP PARAM F/U: HCPCS | Performed by: INTERNAL MEDICINE

## 2022-03-21 PROCEDURE — G8399 PT W/DXA RESULTS DOCUMENT: HCPCS | Performed by: INTERNAL MEDICINE

## 2022-03-21 PROCEDURE — G8427 DOCREV CUR MEDS BY ELIG CLIN: HCPCS | Performed by: INTERNAL MEDICINE

## 2022-03-21 PROCEDURE — G8753 SYS BP > OR = 140: HCPCS | Performed by: INTERNAL MEDICINE

## 2022-03-21 PROCEDURE — 1101F PT FALLS ASSESS-DOCD LE1/YR: CPT | Performed by: INTERNAL MEDICINE

## 2022-03-21 PROCEDURE — G8536 NO DOC ELDER MAL SCRN: HCPCS | Performed by: INTERNAL MEDICINE

## 2022-03-21 PROCEDURE — 1090F PRES/ABSN URINE INCON ASSESS: CPT | Performed by: INTERNAL MEDICINE

## 2022-03-21 NOTE — PROGRESS NOTES
Cruzito Gaspar is a 64 y.o. female who was seen in clinic today (3/21/2022). Assessment & Plan:   Below is the assessment and plan developed based on review of pertinent history, physical exam, labs, studies, and medications. 1. Type 2 diabetes with nephropathy ([facility]  Assessment & Plan:  at goal, continue current treatment pending review of labs    Orders:  -     METABOLIC PANEL, COMPREHENSIVE; Future  -     CBC W/O DIFF; Future  -     HEMOGLOBIN A1C WITH EAG; Future  -     LIPID PANEL; Future  -     MICROALBUMIN, UR, RAND W/ MICROALB/CREAT RATIO; Future  2. Overweight  Assessment & Plan:  stable, needs improvement, I have recommended the following interventions: encourage exercise and lifestyle education regarding diet. 3. Chronic diastolic heart failure ([facility]  Assessment & Plan:  stable, continue current treatment pending review of labs    Orders:  -     METABOLIC PANEL, COMPREHENSIVE; Future  -     CBC W/O DIFF; Future  4. Chronic atrial fibrillation (HCC)  Assessment & Plan:  Asymptomatic, HR a little fast today, normally improved, will check labs   Orders:  -     CBC W/O DIFF; Future  -     DIGOXIN; Future  5. Primary hypertension  Assessment & Plan:  Elevated today, normally well controlled, no med changes, will have her RTC in 2 wks for repeat BP check   Orders:  -     METABOLIC PANEL, COMPREHENSIVE; Future  6. Pure hypercholesterolemia  Assessment & Plan:  well controlled, continue current treatment pending review of labs    Orders:  -     METABOLIC PANEL, COMPREHENSIVE; Future  -     LIPID PANEL; Future  7. Simple chronic bronchitis ([facility]  Assessment & Plan:  Asymptomatic, well controlled, continue current treatment    8. Chronic right shoulder pain  Comments:  improving, offered PT referral, she declined, will continue w/ HEP. She will notify me if she wants to see see specialist  9. Need for hepatitis C screening test  -     HEPATITIS C AB;  Future    Follow-up and Dispositions    · Return in about 2 weeks (around 4/4/2022) for Nurse visit for blood pressure check. Subjective/Objective:   Emily Emmanuel was seen today for Hypertension    Since last visit: she was a no show to her f/u in January (2nd time in the last 2 years). Was told to f/u with cardiology as not seen since July '20, she is scheduled for July '22. Endocrine Review  She is seen for diabetes. Testing: is performed sporadically, but she can't remember the readings. She reports medication compliance: n/a - not on medications (diet controlled). Diabetic diet compliance: compliant most of the time. Cardiovascular Review  The patient has CHF, hypertension, hyperlipidemia and Atrial Fibrillation. She reports taking medications as instructed, no medication side effects noted, patient does not perform home BP monitoring. She generally follows a low fat low cholesterol diet, generally follows a low sodium diet, sedentary. Prior to Admission medications    Medication Sig Start Date End Date Taking? Authorizing Provider   atenoloL (TENORMIN) 25 mg tablet Take 1 Tablet by mouth daily. 2/7/22  Yes Elena Starr MD   apixaban (Eliquis) 5 mg tablet Take 1 Tablet by mouth every twelve (12) hours. 11/23/21  Yes Kyra House MD   valsartan-hydroCHLOROthiazide (DIOVAN-HCT) 320-25 mg per tablet TAKE 1 TABLET EVERY DAY 10/27/21  Yes Elena Starr MD   True Metrix Glucose Meter monitoring kit USE AS DIRECTED.  10/27/21  Yes Elena Starr MD   glucose blood VI test strips (True Metrix Glucose Test Strip) strip USE AS DIRECTED. 10/27/21  Yes Elena Starr MD   TRUEplus Lancets 33 gauge misc USE AS DIRECTED.    10/27/21  Yes Elena Starr MD   digoxin (LANOXIN) 0.125 mg tablet Take 1 Tablet by mouth daily.  7/12/21  Yes Kyra House MD   atorvastatin (LIPITOR) 10 mg tablet TAKE 1 TABLET BY MOUTH EVERY DAY 7/12/21  Yes Elena Starr MD   varicella-zoster recombinant, PF, (Shingrix, PF,) 50 mcg/0.5 mL susr injection 0.5 ml IM once and then repeat in 2-6 months. 6/21/21  Yes Sreekanth Hartley MD   furosemide (Lasix) 20 mg tablet Take 1 Tablet by mouth daily as needed (edema). 1 tablet daily 6/11/21  Yes Selestino Nissen, MD   alcohol swabs (BD Single Use Swabs Regular) padm Use as directed once/day 5/20/21  Yes Sreekanth Hartley MD   Blood-Glucose Meter (Accu-Chek Laney Plus Meter) misc Use as directed. Dx: E11.21 5/18/21  Yes Sreekanth Hartley MD   lancets (Accu-Chek Softclix Lancets) misc Use as directed. Dx: E11.21 5/18/21  Yes Sreekanth Hartley MD   albuterol (PROVENTIL HFA, VENTOLIN HFA, PROAIR HFA) 90 mcg/actuation inhaler Take 1 Puff by inhalation every six (6) hours as needed for Wheezing. 5/13/21  Yes Sreekanth Hartley MD   fluticasone propion-salmeteroL (Wixela Inhub) 250-50 mcg/dose diskus inhaler INHALE 1 PUFF BY MOUTH TWICE A DAY 5/13/21  Yes Sreekanth Hartley MD   FLUTICASONE PROPIONATE (FLONASE NA) by Nasal route as needed. Yes Provider, Historical   fexofenadine (ALLEGRA) 180 mg tablet Take 180 mg by mouth daily as needed for Allergies. Yes Provider, Historical   triamcinolone acetonide (KENALOG) 0.1 % topical cream APPLY A THIN LAYER TO AFFECTED AREA(S) AS DIRECTED TWICE A DAY AS NEEDED 5/17/17  Yes Sreekanth Hartley MD   aspirin 81 mg tablet Take 1 Tab by mouth daily. 1/9/14  Yes Sreekanth Hartley MD   cpap machine kit by Does Not Apply route nightly. Yes Provider, Historical        Review of Systems   Constitutional: Negative for malaise/fatigue and weight loss. Respiratory: Negative for cough and shortness of breath. Cardiovascular: Positive for leg swelling (R>L, using lasix rarely). Negative for chest pain and palpitations. Gastrointestinal: Negative for abdominal pain, constipation, diarrhea, nausea and vomiting. Musculoskeletal: Positive for joint pain (R shoulder pain w/ decreased ROM, it is improving.  She did not go see PT which was the recommendation from 12/20). Psychiatric/Behavioral: Negative for depression. The patient is not nervous/anxious and does not have insomnia. Physical Exam  Constitutional:       General: She is not in acute distress. Appearance: Normal appearance. Eyes:      Conjunctiva/sclera: Conjunctivae normal.   Cardiovascular:      Rate and Rhythm: Rhythm irregularly irregular. Pulses:           Dorsalis pedis pulses are 2+ on the right side and 2+ on the left side. Heart sounds: No murmur heard. Pulmonary:      Effort: Pulmonary effort is normal.      Breath sounds: Normal breath sounds. No decreased breath sounds or wheezing. Abdominal:      General: Bowel sounds are normal.      Palpations: Abdomen is soft. Tenderness: There is no abdominal tenderness. Musculoskeletal:      Right lower leg: Edema (trace swelling in R shin) present. Left lower leg: No edema. Feet:      Right foot:      Skin integrity: Skin integrity normal.      Toenail Condition: Right toenails are abnormally thick. Left foot:      Skin integrity: Skin integrity normal.      Toenail Condition: Left toenails are abnormally thick.       Comments:        Left foot Filament test: normal sensation        Right foot Filament test: normal sensation   Psychiatric:         Mood and Affect: Mood and affect normal.         Behavior: Behavior normal.       Visit Vitals  BP (!) 160/80 (BP 1 Location: Right arm, BP Patient Position: Sitting, BP Cuff Size: Adult)   Pulse (!) 104 Comment: irreg   Temp 97.1 °F (36.2 °C) (Temporal)   Resp 20   Ht 5' 3\" (1.6 m)   Wt 168 lb 6.4 oz (76.4 kg)   SpO2 98%   BMI 29.83 kg/m²       Pawel Quach MD

## 2022-03-21 NOTE — ASSESSMENT & PLAN NOTE
Elevated today, normally well controlled, no med changes, will have her RTC in 2 wks for repeat BP check

## 2022-03-23 NOTE — PROGRESS NOTES
Letter sent to patient. All labs are stable or at goal except for worsening urine studies. DM stable. On max dose of ARB. Will monitor. Dig low but stable, she is asymptomatic.

## 2022-03-25 RX ORDER — ATORVASTATIN CALCIUM 10 MG/1
10 TABLET, FILM COATED ORAL DAILY
Qty: 90 TABLET | Refills: 1 | Status: CANCELLED | OUTPATIENT
Start: 2022-03-25

## 2022-03-25 RX ORDER — ATORVASTATIN CALCIUM 10 MG/1
10 TABLET, FILM COATED ORAL DAILY
Qty: 90 TABLET | Refills: 1 | Status: SHIPPED | OUTPATIENT
Start: 2022-03-25 | End: 2022-03-28 | Stop reason: SDUPTHER

## 2022-03-25 NOTE — TELEPHONE ENCOUNTER
Requested Prescriptions     Pending Prescriptions Disp Refills    atorvastatin (LIPITOR) 10 mg tablet 90 Tablet 1     Sig: Take 1 Tablet by mouth daily.      [address], [address] [place] [address]

## 2022-03-25 NOTE — TELEPHONE ENCOUNTER
PT IS COMPLETELY OUT OF MED AND IS REQUESTING THAT HER REFILL BE SENT TO [facility]    Requested Prescriptions     Pending Prescriptions Disp Refills    atorvastatin (LIPITOR) 10 mg tablet 90 Tablet 1     Sig: Take 1 Tablet by mouth daily.      [facility] #[id number]- [address] [facility]  [phone]

## 2022-03-28 ENCOUNTER — TELEPHONE (OUTPATIENT)
Dept: INTERNAL MEDICINE CLINIC | Age: 77
End: 2022-03-28

## 2022-03-28 RX ORDER — ATORVASTATIN CALCIUM 10 MG/1
10 TABLET, FILM COATED ORAL DAILY
Qty: 90 TABLET | Refills: 1 | Status: SHIPPED | OUTPATIENT
Start: 2022-03-28

## 2022-03-28 NOTE — TELEPHONE ENCOUNTER
Patient's prescription for Atorvastatin 10mg tabs should have been sent to [facility]. It was sent to [facility] on the 25th. Please re-send to [facility] [place].

## 2022-04-04 ENCOUNTER — CLINICAL SUPPORT (OUTPATIENT)
Dept: INTERNAL MEDICINE CLINIC | Age: 77
End: 2022-04-04

## 2022-04-04 DIAGNOSIS — I10 ESSENTIAL HYPERTENSION: Primary | ICD-10-CM

## 2022-04-04 NOTE — PROGRESS NOTES
Pt. Is here for BP check. After sitting Bp 130/70 Hr 88 irreg. Pt. Complains of right shoulder pain today, yesterday was left shoulder. Pt. Has A-fib after walking to room  irreg. And SOB, resolved quickly. Pt. Has not missed any medication. Pt. Wants a phone call with Dr Bob Euceda recommendation.

## 2022-04-04 NOTE — PROGRESS NOTES
Attempted to reach patient for scheduling regarding message below. Phone continued to ring never went to voicemail/ no answer- unable to LVM.         ----- Message from Elma Cameron sent at 4/4/2022 11:34 AM CDT -----  Regarding: Scheduling  Good morning,    MINOR Lyons placed an for the patient can you assist with scheduling please? Thanks.       Bilateral impacted cerumen [H61.23]       Letter sent to patient. All labs are stable or at goal except for abnormal urine test.  On ARB. A1c improved.

## 2022-04-05 VITALS
RESPIRATION RATE: 24 BRPM | DIASTOLIC BLOOD PRESSURE: 70 MMHG | HEIGHT: 63 IN | WEIGHT: 169 LBS | HEART RATE: 120 BPM | OXYGEN SATURATION: 97 % | BODY MASS INDEX: 29.95 KG/M2 | SYSTOLIC BLOOD PRESSURE: 130 MMHG | TEMPERATURE: 97.1 F

## 2022-04-05 NOTE — PROGRESS NOTES
BP is improved, more concerning is the HR going up so much with activity. She needs to contact cardiology (Dr Larry Allen) since she is on digoxin, I need to defer to him.   She may need medication adjusted to keep HR < 100

## 2022-04-05 NOTE — PROGRESS NOTES
Pt. Notified to contact Dr Antonio Patterson regarding her HR being elevated with minimal exertion. Please review nurse visit for BP check.  Thanks

## 2022-04-11 ENCOUNTER — OFFICE VISIT (OUTPATIENT)
Dept: CARDIOLOGY CLINIC | Age: 77
End: 2022-04-11
Payer: MEDICARE

## 2022-04-11 VITALS
WEIGHT: 165.8 LBS | HEART RATE: 71 BPM | SYSTOLIC BLOOD PRESSURE: 124 MMHG | DIASTOLIC BLOOD PRESSURE: 74 MMHG | OXYGEN SATURATION: 100 % | BODY MASS INDEX: 29.38 KG/M2 | RESPIRATION RATE: 18 BRPM | HEIGHT: 63 IN

## 2022-04-11 DIAGNOSIS — Z79.01 ANTICOAGULANT LONG-TERM USE: ICD-10-CM

## 2022-04-11 DIAGNOSIS — I36.1 NONRHEUMATIC TRICUSPID VALVE REGURGITATION: ICD-10-CM

## 2022-04-11 DIAGNOSIS — I42.2 APICAL VARIANT HYPERTROPHIC CARDIOMYOPATHY (HCC): ICD-10-CM

## 2022-04-11 DIAGNOSIS — I48.20 CHRONIC ATRIAL FIBRILLATION (HCC): ICD-10-CM

## 2022-04-11 DIAGNOSIS — I48.20 CHRONIC ATRIAL FIBRILLATION (HCC): Primary | ICD-10-CM

## 2022-04-11 DIAGNOSIS — R00.0 TACHYCARDIA: ICD-10-CM

## 2022-04-11 DIAGNOSIS — I10 ESSENTIAL HYPERTENSION: ICD-10-CM

## 2022-04-11 DIAGNOSIS — I50.32 CHRONIC DIASTOLIC HEART FAILURE (HCC): ICD-10-CM

## 2022-04-11 PROCEDURE — G8536 NO DOC ELDER MAL SCRN: HCPCS | Performed by: NURSE PRACTITIONER

## 2022-04-11 PROCEDURE — 93000 ELECTROCARDIOGRAM COMPLETE: CPT | Performed by: NURSE PRACTITIONER

## 2022-04-11 PROCEDURE — 1101F PT FALLS ASSESS-DOCD LE1/YR: CPT | Performed by: NURSE PRACTITIONER

## 2022-04-11 PROCEDURE — G8417 CALC BMI ABV UP PARAM F/U: HCPCS | Performed by: NURSE PRACTITIONER

## 2022-04-11 PROCEDURE — G8752 SYS BP LESS 140: HCPCS | Performed by: NURSE PRACTITIONER

## 2022-04-11 PROCEDURE — 1090F PRES/ABSN URINE INCON ASSESS: CPT | Performed by: NURSE PRACTITIONER

## 2022-04-11 PROCEDURE — G8427 DOCREV CUR MEDS BY ELIG CLIN: HCPCS | Performed by: NURSE PRACTITIONER

## 2022-04-11 PROCEDURE — 99214 OFFICE O/P EST MOD 30 MIN: CPT | Performed by: NURSE PRACTITIONER

## 2022-04-11 PROCEDURE — G8754 DIAS BP LESS 90: HCPCS | Performed by: NURSE PRACTITIONER

## 2022-04-11 PROCEDURE — G8432 DEP SCR NOT DOC, RNG: HCPCS | Performed by: NURSE PRACTITIONER

## 2022-04-11 PROCEDURE — G8399 PT W/DXA RESULTS DOCUMENT: HCPCS | Performed by: NURSE PRACTITIONER

## 2022-04-11 RX ORDER — ATENOLOL 50 MG/1
50 TABLET ORAL DAILY
Qty: 90 TABLET | Refills: 1 | Status: SHIPPED | OUTPATIENT
Start: 2022-04-11 | End: 2022-07-25 | Stop reason: SDUPTHER

## 2022-04-11 NOTE — PATIENT INSTRUCTIONS
Please increase your atenolol to 50mg daily   Please call the office if you develop lightheadedness or dizziness

## 2022-04-11 NOTE — PROGRESS NOTES
Kimberley Fink     1945       Date of Visit-4/11/2022   PCP is Fabrizio Campbell MD   Cardiologist: Max Zee. Melina Hicks MD, [address] [place] [address] [facility] [place]  [facility]    HPI:  Kimberley Fink is a 64 y.o. 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 Echo in November 2019. Last echo 8/21 with EF 60-65%, severe apical hypertrophy, mild MR, mod TR, severely dilated atria, mild PA HTN (41mmHg)  She is here today regarding her high heart rates while in Afib  Dr. aJmes De Paz raised concerns about this so she is here to follow up today  At rest her heart rate is 70bpm but she says she'll have palpitations with walking or when she gets excited  She denies any dyspnea or chest pain   Says she needs to walk more but has LE edema which limits her, only takes lasix PRN   She is not taking it often, wears compression stockings daily for venous stasis   Pt ambulates with a cane. EKG: Atrial fibrillation   Voltage criteria for LVH  (S(V1)+R(V6) exceeds 3.50 mV). -Anteroseptal infarct -age undetermined. -ST depression   +   Extensive T-abnormality likely due to LV hypertrophy    Assessment/Plan:     Patient Instructions   Please increase your atenolol to 50mg daily   Please call the office if you develop lightheadedness or dizziness       1. Chronic atrial fibrillation (HCC)  For tachycardia with activity will increase atenolol to 50mg daily, continue digoxin  Continue eliquis for anticoagulation  She will return in 3 weeks to have her heart rate checked with ambulation on the higher dose of atenolol     2. Apical variant hypertrophic cardiomyopathy ([address]  MRI done in June 2015 confirmed an apical septal wall at 20 mm with typical shape noted. Last echo 8/21.   Will repeat echo in June prior to her next visit with Dr. Melina Hicks   --overall this carries a fairly benign prognosis when confined to apex and not global  --EKG findings consistent with hypertrophy. 3. Chronic diastolic heart failure (HCC)  Compensated, NYHA 2. She does have some edema which is venous insufficiency. BP well controlled     4. Essential hypertension  Well controlled on atenolol, valsartan HCT    At goal , meds and possible side effects reviewed and patient denies  Key CAD CHF Meds             atenoloL (TENORMIN) 50 mg tablet (Taking) Take 1 Tablet by mouth daily. atorvastatin (LIPITOR) 10 mg tablet (Taking) Take 1 Tablet by mouth daily. apixaban (Eliquis) 5 mg tablet (Taking) Take 1 Tablet by mouth every twelve (12) hours. valsartan-hydroCHLOROthiazide (DIOVAN-HCT) 320-25 mg per tablet (Taking) TAKE 1 TABLET EVERY DAY    digoxin (LANOXIN) 0.125 mg tablet (Taking) Take 1 Tablet by mouth daily. furosemide (Lasix) 20 mg tablet (Taking) Take 1 Tablet by mouth daily as needed (edema). 1 tablet daily    aspirin 81 mg tablet (Taking) Take 1 Tab by mouth daily. BP Readings from Last 6 Encounters:   04/11/22 124/74   04/04/22 130/70   03/21/22 (!) 160/80   07/02/21 120/80   06/21/21 101/65   06/11/21 (!) 149/79        5. Tricuspid regurgitation  Can take lasix PRN if needed for edema  Will reassess with TTE prior to her next appointment with Dr. Adonis Crump     ECHO ADULT COMPLETE 08/15/2021 8/15/2021    Interpretation Summary  · LV: Estimated LVEF is 60 - 65%. Small left ventricle. Severe apical hypertrophy. Basal wall segments contract normally. · RV: Normal cavity size and global systolic function. Increased wall thickness. · MV: Mild mitral valve regurgitation is present. · TV: Moderate tricuspid valve regurgitation is present. · LA: Severely dilated left atrium. Left Atrium volume index is 130 mL/m2. Very large LA. · RA: Severely dilated right atrium. · PA: Mild pulmonary hypertension. Pulmonary arterial systolic pressure is 41 mmHg.     Signed by: Abdirashid Garcia MD on 8/15/2021 12:36 AM       Future Appointments   Date Time Provider Franchesca Godinez   5/18/2022  1:40 PM BrittanyKiley alvarado Tuan, DANNIELLE VERGARA BS AMB   6/20/2022  1:30 PM MD LILA Vasquez BS AMB   6/27/2022  1:00 PM ROSETTA OLVERA BS AMB   7/1/2022  9:40 AM Dejon Fofana MD [facility] BS AMB      Impression:   1. Chronic atrial fibrillation (HCC)    2. Apical variant hypertrophic cardiomyopathy ([address]    3. Chronic diastolic heart failure ([address]    4. Essential hypertension    5. Tachycardia    6. Nonrheumatic tricuspid valve regurgitation    7. Anticoagulant long-term use       Cardiac History:   Chronic atrial fibrillation   Diastolic CHF    ECHO 8-12-63= ef 55-60%, biatrial enlargement  ECHO 3- EF 70%, AK of apex with apical hypertrophy, mild TR,LAE, Pulm Htn    Admit Jan 2012 with asthma and May 2013; Chronic airway dz    Cardiac MRI     1. Apical hypertrophic cardiomyopathy. The apical septal wall measures 20   mm. The apical lateral and mid inferolateral wall measures 26 mm. Complete   obliteration of the LV apical cavity. Formoso like shape of the LV cavity   typical of the apical hypertrophic cardiomyopathy. Hyperdynamic left   ventricular systolic function with end-systolic cavity obliteration. 3-D   LVEF 86%. There is no systolic anterior motion of the mitral apparatus or no   LV outflow tract obstruction. 2. Normal right ventricular size and systolic function. RVEF 60%. 3. Mild 1+ mitral regurgitation. 4. Moderate 2+ tricuspid regurgitation. 5. Normal rest myocardial perfusion on first pass perfusion imaging. 6. On EGE and LGE imaging, there is no areas myocardial enhancement to   suggest any myocardial scar, recent or old myocardial infarction,   infiltration or inflammation. There is no features of myocardial sarcoidosis   or amyloidosis. There is no features of inflammatory cardiomyopathy such as   myocarditis. 7. Normal pleura and pericardium. Trace anterior and posterior pericardial   effusion. 8. Markedly dilated RA and LA. ROS-except as noted above. . A complete cardiac and respiratory are reviewed and negative except as above ; Resp-denies wheezing  or productive cough,. Const- No unusual weight loss or fever; Neuro-no recent seizure or CVA ; GI- No BRBPR, abdom pain, bloating ; - no  hematuria     Past Medical History:   Diagnosis Date    Apical variant hypertrophic cardiomyopathy ([facility]     Chronic diastolic heart failure (HCC)     HTN (hypertension)     Hx of mammogram     LVH (left ventricular hypertrophy) due to hypertensive disease     Menopause     LMP-34years old?  Mild pulmonary hypertension ([facility] 9/13/2012    Obstructive sleep apnea (adult) (pediatric) 07-    AHI: 5.0 per hour    Pneumonia 2013 admit [address] Hx= reports that she quit smoking about 26 years ago. She has a 2.00 pack-year smoking history. She has never used smokeless tobacco. She reports that she does not drink alcohol and does not use drugs. Exam and Labs:  /74 (BP 1 Location: Left arm, BP Patient Position: Sitting, BP Cuff Size: Adult)   Pulse 71   Resp 18   Ht 5' 3\" (1.6 m)   Wt 165 lb 12.8 oz (75.2 kg)   SpO2 100%   BMI 29.37 kg/m² Constitutional:  NAD, comfortable  Head: NC,AT. Eyes: No scleral icterus. Neck:  Neck supple. No JVD present. Throat: moist mucous membranes. Chest: Effort normal & normal respiratory excursion . Neurological: alert, conversant and oriented . Skin: Skin is not cold. No obvious systemic rash noted. Not diaphoretic. No erythema. Psychiatric:  Grossly normal mood and affect. Behavior appears normal. Extremities:  no clubbing or cyanosis. Abdomen: non distended    Lungs:breath sounds normal. No stridor. distress, wheezes or rales   Heart: irregular rhythm, normal S1, S2, no murmurs, rubs, clicks or gallops , PMI non displaced.     Edema: Edema is 1+ at the ankles     Lab Results   Component Value Date/Time    Cholesterol, total 112 03/21/2022 09:18 AM    HDL Cholesterol 44 03/21/2022 09:18 AM    LDL, calculated 50.2 03/21/2022 09:18 AM    Triglyceride 89 03/21/2022 09:18 AM    CHOL/HDL Ratio 2.5 03/21/2022 09:18 AM     Lab Results   Component Value Date/Time    Sodium 140 03/21/2022 09:18 AM    Potassium 3.7 03/21/2022 09:18 AM    Chloride 103 03/21/2022 09:18 AM    CO2 33 (H) 03/21/2022 09:18 AM    Anion gap 4 (L) 03/21/2022 09:18 AM    Glucose 103 (H) 03/21/2022 09:18 AM    BUN 18 03/21/2022 09:18 AM    Creatinine 0.80 03/21/2022 09:18 AM    BUN/Creatinine ratio 23 (H) 03/21/2022 09:18 AM    GFR est AA >60 03/21/2022 09:18 AM    GFR est non-AA >60 03/21/2022 09:18 AM    Calcium 9.1 03/21/2022 09:18 AM      Wt Readings from Last 3 Encounters:   04/11/22 165 lb 12.8 oz (75.2 kg)   04/04/22 169 lb (76.7 kg)   03/21/22 168 lb 6.4 oz (76.4 kg)      BP Readings from Last 3 Encounters:   04/11/22 124/74   04/04/22 130/70   03/21/22 (!) 160/80      Current Outpatient Medications   Medication Sig    atenoloL (TENORMIN) 50 mg tablet Take 1 Tablet by mouth daily.  atorvastatin (LIPITOR) 10 mg tablet Take 1 Tablet by mouth daily.  apixaban (Eliquis) 5 mg tablet Take 1 Tablet by mouth every twelve (12) hours.  valsartan-hydroCHLOROthiazide (DIOVAN-HCT) 320-25 mg per tablet TAKE 1 TABLET EVERY DAY    True Metrix Glucose Meter monitoring kit USE AS DIRECTED.  glucose blood VI test strips (True Metrix Glucose Test Strip) strip USE AS DIRECTED.  TRUEplus Lancets 33 gauge misc USE AS DIRECTED.  digoxin (LANOXIN) 0.125 mg tablet Take 1 Tablet by mouth daily.  furosemide (Lasix) 20 mg tablet Take 1 Tablet by mouth daily as needed (edema). 1 tablet daily    alcohol swabs (BD Single Use Swabs Regular) padm Use as directed once/day    Blood-Glucose Meter (Accu-Chek Laney Plus Meter) misc Use as directed. Dx: E11.21    lancets (Accu-Chek Softclix Lancets) misc Use as directed.   Dx: E11.21    albuterol (PROVENTIL HFA, VENTOLIN HFA, PROAIR HFA) 90 mcg/actuation inhaler Take 1 Puff by inhalation every six (6) hours as needed for Wheezing.  fluticasone propion-salmeteroL (Wixela Inhub) 250-50 mcg/dose diskus inhaler INHALE 1 PUFF BY MOUTH TWICE A DAY    FLUTICASONE PROPIONATE (FLONASE NA) by Nasal route as needed.  fexofenadine (ALLEGRA) 180 mg tablet Take 180 mg by mouth daily as needed for Allergies.  triamcinolone acetonide (KENALOG) 0.1 % topical cream APPLY A THIN LAYER TO AFFECTED AREA(S) AS DIRECTED TWICE A DAY AS NEEDED    aspirin 81 mg tablet Take 1 Tab by mouth daily.  cpap machine kit by Does Not Apply route nightly.  varicella-zoster recombinant, PF, (Shingrix, PF,) 50 mcg/0.5 mL susr injection 0.5 ml IM once and then repeat in 2-6 months. No current facility-administered medications for this visit.      NATA Scales, [id number] [facility]  [facility]  [address], [address] [facility] [address]  [facility], [address] [place] [address]  () [phone] (PFG) [phone]

## 2022-04-12 RX ORDER — BLOOD-GLUCOSE METER
EACH MISCELLANEOUS
Qty: 1 EACH | Refills: 1 | Status: SHIPPED | OUTPATIENT
Start: 2022-04-12

## 2022-04-12 RX ORDER — FLUTICASONE PROPIONATE AND SALMETEROL 250; 50 UG/1; UG/1
POWDER RESPIRATORY (INHALATION)
Qty: 180 EACH | Refills: 1 | Status: SHIPPED | OUTPATIENT
Start: 2022-04-12

## 2022-04-13 DIAGNOSIS — I10 ESSENTIAL HYPERTENSION: ICD-10-CM

## 2022-04-13 DIAGNOSIS — E11.9 CONTROLLED TYPE 2 DIABETES MELLITUS WITHOUT COMPLICATION, WITHOUT LONG-TERM CURRENT USE OF INSULIN (HCC): ICD-10-CM

## 2022-04-13 RX ORDER — VALSARTAN AND HYDROCHLOROTHIAZIDE 320; 25 MG/1; MG/1
TABLET, FILM COATED ORAL
Qty: 90 TABLET | Refills: 1 | Status: SHIPPED | OUTPATIENT
Start: 2022-04-13 | End: 2022-10-13 | Stop reason: SDUPTHER

## 2022-04-13 NOTE — TELEPHONE ENCOUNTER
Per VO by MD.     Future Appointments   Date Time Provider Franchesca Gretchen   5/18/2022  1:40 PM Shahbaz Wing NP [facility] BS AMB   6/20/2022  1:30 PM MD LILA Barbosa BS AMB   6/27/2022  1:00 PM ROSETTA OLVERA BS AMB   7/1/2022  9:40 AM Dejon Fofana MD [facility] BS AMB

## 2022-05-18 ENCOUNTER — DOCUMENTATION ONLY (OUTPATIENT)
Dept: CARDIOLOGY CLINIC | Age: 77
End: 2022-05-18

## 2022-05-18 NOTE — PROGRESS NOTES
Patient did not show for appointment today    Already has follow up with Dr. Barron Nelson in July    Future Appointments   Date Time Provider Franchesca Godinez   6/20/2022  1:30 PM MD LILA Penny BS AMB   6/27/2022  1:00 PM ROSETTA OLVERA BS AMB   7/1/2022  9:40 AM Dejon Fofana MD [facility] BS AMB

## 2022-06-24 ENCOUNTER — TELEPHONE (OUTPATIENT)
Dept: INTERNAL MEDICINE CLINIC | Age: 77
End: 2022-06-24

## 2022-06-24 NOTE — TELEPHONE ENCOUNTER
----- Message from Shashank Miguel sent at 6/23/2022  4:35 PM EDT -----  Subject: Message to Provider    QUESTIONS  Information for Provider? Pt received a letter she describes as \"very   disturbing to me. \" and \"I certainly did not know I had a doctor's appt. I   would have found a way to get there. \" \"I'm very, very upset. \" She is   distressed would like Dr. Violetta Dalal to reconsider if possible.  ---------------------------------------------------------------------------  --------------  [address] [place] [address]  What is the best way for the office to contact you? OK to leave message on   voicemail  Preferred Call Back Phone Number? [phone]  ---------------------------------------------------------------------------  --------------  SCRIPT ANSWERS  Relationship to Patient?  Self

## 2022-06-27 ENCOUNTER — ANCILLARY PROCEDURE (OUTPATIENT)
Dept: CARDIOLOGY CLINIC | Age: 77
End: 2022-06-27
Payer: MEDICARE

## 2022-06-27 VITALS — BODY MASS INDEX: 29.23 KG/M2 | HEIGHT: 63 IN | WEIGHT: 165 LBS

## 2022-06-27 DIAGNOSIS — I42.2 APICAL VARIANT HYPERTROPHIC CARDIOMYOPATHY (HCC): ICD-10-CM

## 2022-06-27 DIAGNOSIS — I50.32 CHRONIC DIASTOLIC HEART FAILURE (HCC): ICD-10-CM

## 2022-06-27 PROCEDURE — 93306 TTE W/DOPPLER COMPLETE: CPT | Performed by: SPECIALIST

## 2022-06-27 RX ORDER — ISOPROPYL ALCOHOL 70 ML/100ML
SWAB TOPICAL
Qty: 100 PAD | Refills: 0 | Status: SHIPPED | OUTPATIENT
Start: 2022-06-27

## 2022-06-29 LAB
ECHO AO ASC DIAM: 2.9 CM
ECHO AO ASCENDING AORTA INDEX: 1.63 CM/M2
ECHO AO ROOT DIAM: 2.7 CM
ECHO AO ROOT INDEX: 1.52 CM/M2
ECHO AV AREA PEAK VELOCITY: 1.9 CM2
ECHO AV AREA VTI: 2.2 CM2
ECHO AV AREA/BSA PEAK VELOCITY: 1.1 CM2/M2
ECHO AV AREA/BSA VTI: 1.2 CM2/M2
ECHO AV MEAN GRADIENT: 4 MMHG
ECHO AV MEAN VELOCITY: 1 M/S
ECHO AV PEAK GRADIENT: 9 MMHG
ECHO AV PEAK VELOCITY: 1.5 M/S
ECHO AV VELOCITY RATIO: 0.73
ECHO AV VTI: 23.8 CM
ECHO EST RA PRESSURE: 15 MMHG
ECHO LA DIAMETER INDEX: 3.6 CM/M2
ECHO LA DIAMETER: 6.4 CM
ECHO LA TO AORTIC ROOT RATIO: 2.37
ECHO LA VOL 2C: 269 ML (ref 22–52)
ECHO LA VOL 4C: 260 ML (ref 22–52)
ECHO LA VOL BP: 266 ML (ref 22–52)
ECHO LA VOL/BSA BIPLANE: 149 ML/M2 (ref 16–34)
ECHO LA VOLUME AREA LENGTH: 284 ML
ECHO LA VOLUME INDEX A2C: 151 ML/M2 (ref 16–34)
ECHO LA VOLUME INDEX A4C: 146 ML/M2 (ref 16–34)
ECHO LA VOLUME INDEX AREA LENGTH: 160 ML/M2 (ref 16–34)
ECHO LV FRACTIONAL SHORTENING: 40 % (ref 28–44)
ECHO LV INTERNAL DIMENSION DIASTOLE INDEX: 2.53 CM/M2
ECHO LV INTERNAL DIMENSION DIASTOLIC: 4.5 CM (ref 3.9–5.3)
ECHO LV INTERNAL DIMENSION SYSTOLIC INDEX: 1.52 CM/M2
ECHO LV INTERNAL DIMENSION SYSTOLIC: 2.7 CM
ECHO LV IVSD: 1.5 CM (ref 0.6–0.9)
ECHO LV MASS 2D: 319.6 G (ref 67–162)
ECHO LV MASS INDEX 2D: 179.6 G/M2 (ref 43–95)
ECHO LV POSTERIOR WALL DIASTOLIC: 1.8 CM (ref 0.6–0.9)
ECHO LV RELATIVE WALL THICKNESS RATIO: 0.8
ECHO LVOT AREA: 2.8 CM2
ECHO LVOT AV VTI INDEX: 0.8
ECHO LVOT DIAM: 1.9 CM
ECHO LVOT MEAN GRADIENT: 2 MMHG
ECHO LVOT PEAK GRADIENT: 5 MMHG
ECHO LVOT PEAK VELOCITY: 1.1 M/S
ECHO LVOT STROKE VOLUME INDEX: 30.2 ML/M2
ECHO LVOT SV: 53.8 ML
ECHO LVOT VTI: 19 CM
ECHO PV MAX VELOCITY: 0.7 M/S
ECHO PV PEAK GRADIENT: 2 MMHG
ECHO RA MINOR AXIS INDEX: 2.47 CM/M2
ECHO RA MINOR AXIS: 4.4 CM
ECHO RIGHT VENTRICULAR SYSTOLIC PRESSURE (RVSP): 53 MMHG
ECHO RV INTERNAL DIMENSION: 3.4 CM
ECHO RV TAPSE: 1.4 CM (ref 1.7–?)
ECHO TV REGURGITANT MAX VELOCITY: 3.09 M/S
ECHO TV REGURGITANT PEAK GRADIENT: 38 MMHG

## 2022-06-30 NOTE — PROGRESS NOTES
Ct Patient  Echo showed normal heart function but tricuspid valve regurgitation appears to be worse. Please encourage her to attend her appointment with Dr. Corbin Raymond to discuss further. Please ask her to bring up to date medication list with her.   Future Appointments  7/25/2022  11:00 AM   Dejon Fofana MD [facility] BS AMB

## 2022-07-14 NOTE — PROGRESS NOTES
Two patient identifiers verified. Per NP patient called and given results. Confirmed follow up. Patient verbalized understanding and denies any further questions or concerns at this time.

## 2022-07-25 ENCOUNTER — OFFICE VISIT (OUTPATIENT)
Dept: CARDIOLOGY CLINIC | Age: 77
End: 2022-07-25
Payer: MEDICARE

## 2022-07-25 VITALS
BODY MASS INDEX: 29.95 KG/M2 | HEART RATE: 87 BPM | RESPIRATION RATE: 16 BRPM | DIASTOLIC BLOOD PRESSURE: 80 MMHG | OXYGEN SATURATION: 97 % | WEIGHT: 169 LBS | HEIGHT: 63 IN | SYSTOLIC BLOOD PRESSURE: 130 MMHG

## 2022-07-25 DIAGNOSIS — I50.32 CHRONIC DIASTOLIC HEART FAILURE (HCC): ICD-10-CM

## 2022-07-25 DIAGNOSIS — I42.2 APICAL VARIANT HYPERTROPHIC CARDIOMYOPATHY (HCC): Primary | ICD-10-CM

## 2022-07-25 DIAGNOSIS — I36.1 NONRHEUMATIC TRICUSPID VALVE REGURGITATION: ICD-10-CM

## 2022-07-25 DIAGNOSIS — I10 ESSENTIAL HYPERTENSION: ICD-10-CM

## 2022-07-25 DIAGNOSIS — I48.20 CHRONIC ATRIAL FIBRILLATION (HCC): ICD-10-CM

## 2022-07-25 DIAGNOSIS — I34.0 NONRHEUMATIC MITRAL VALVE REGURGITATION: ICD-10-CM

## 2022-07-25 PROCEDURE — 99214 OFFICE O/P EST MOD 30 MIN: CPT | Performed by: SPECIALIST

## 2022-07-25 PROCEDURE — G8427 DOCREV CUR MEDS BY ELIG CLIN: HCPCS | Performed by: SPECIALIST

## 2022-07-25 PROCEDURE — G8536 NO DOC ELDER MAL SCRN: HCPCS | Performed by: SPECIALIST

## 2022-07-25 PROCEDURE — G8417 CALC BMI ABV UP PARAM F/U: HCPCS | Performed by: SPECIALIST

## 2022-07-25 PROCEDURE — G8432 DEP SCR NOT DOC, RNG: HCPCS | Performed by: SPECIALIST

## 2022-07-25 PROCEDURE — G8752 SYS BP LESS 140: HCPCS | Performed by: SPECIALIST

## 2022-07-25 PROCEDURE — G8399 PT W/DXA RESULTS DOCUMENT: HCPCS | Performed by: SPECIALIST

## 2022-07-25 PROCEDURE — 1101F PT FALLS ASSESS-DOCD LE1/YR: CPT | Performed by: SPECIALIST

## 2022-07-25 PROCEDURE — 1123F ACP DISCUSS/DSCN MKR DOCD: CPT | Performed by: SPECIALIST

## 2022-07-25 PROCEDURE — 1090F PRES/ABSN URINE INCON ASSESS: CPT | Performed by: SPECIALIST

## 2022-07-25 PROCEDURE — G8754 DIAS BP LESS 90: HCPCS | Performed by: SPECIALIST

## 2022-07-25 RX ORDER — ATENOLOL 50 MG/1
50 TABLET ORAL DAILY
Qty: 30 TABLET | Refills: 3 | Status: SHIPPED | OUTPATIENT
Start: 2022-07-25

## 2022-07-25 NOTE — PROGRESS NOTES
Ariella Alba     1945       Dejon Christopher MD, [facility] [place] [facility]  Date of Visit-7/25/2022   PCP is Shalini Ca MD   [facility] [place]  [facility]  HPI:  Ariella Alba is a 76 y.o. female   4 month fu -see NP note 4-11-22, reduced atenolol  atrial fibrillation   apical hypertrophic cardiomyopathy    diastolic dysfunction. Echo 6/27/2022 EF 55 to 60% apical variant hypertrophic cardiomyopathy reduced RV function with TAPSE of 1.4 mild to moderate MR moderate to severe TR PASP is 53 dilated atria  Echocardiogram done and it shows no change. She has thickening of the heart from this apical hypertrophy that extends mainly from the apex or tip of the heart but then back to the rest of the heart. The pump function of the heart remains normal but this thickening of the heart causes her to have a stiff heart that makes her short of breath at times and dilates her atria. This is classic diastolic heart failure symptoms and she will get short of breath at times. This explains her shortness of breath if she is having some now. Overall however this is unchanged from previous and the current therapy is appropriate. Have her let us know if she gets any worsening shortness of breath or edema and will just keep her follow-up as planned  06/27/22    ECHO ADULT COMPLETE 06/29/2022 6/29/2022    Interpretation Summary  Formatting of this result is different from the original.      Left Ventricle: Normal left ventricular systolic function with a visually estimated EF of 55 - 60%. Left ventricle size is normal. Increased wall thickness. See diagram for wall motion findings. Echocardiographic features are suggestive of hypertrophic (apical variant) cardiomyopathy. Right Ventricle: Reduced systolic function. TAPSE is abnormal. TAPSE is 1.4 cm. Mitral Valve: Mild to moderate regurgitation. Tricuspid Valve:  Moderate to severe regurgitation with an eccentrically directed jet and may underestimate severity. Mildly elevated RVSP. The estimated RVSP is 53 mmHg. Left Atrium: Left atrium is severely dilated. Left atrial volume index is severely increased (>48 mL/m2). Right Atrium: Right atrium is dilated. Signed by: James Pineda MD on 6/29/2022  5:46 PM      today returns noticing some tachycardia has swelling in the feet and occasional aching joints. Generally her edema is less. Her shortness with stable. She has mild blueish  discoloration in her legs. Assessment/Plan:       1. Apical variant hypertrophic cardiomyopathy ([address]  MRI done in June 2015 confirmed an apical septal wall at 20 mm with typical shape noted. Last echo 8/21. Will repeat echo in June prior to her next visit with Dr. Irving Cisneros   --overall this carries a fairly benign prognosis when confined to apex and not global  --EKG findings previously consistent with hypertrophy. stable cardiac status continue current meds tolerating the things well we will refill Eliquis and atenolol follow-up in 1 year    2. Chronic atrial fibrillation ([address]  Last visit increased atenolol. She notes occasional tachycardia but her edema is less and shortness of breath is stable she seems a fair heart rate control     3. Chronic diastolic heart failure (HCC)  Compensated NYHA II we went over her echo. with 1+ edema noted and a component of venous insufficiency continue with diuretic and control blood pressure    4. Essential hypertension  At goal , meds and possible side effects reviewed and patient denies  Key CAD CHF Meds               atenoloL (TENORMIN) 50 mg tablet (Taking) Take 1 Tablet by mouth in the morning. apixaban (Eliquis) 5 mg tablet (Taking) Take 1 Tablet by mouth every twelve (12) hours. valsartan-hydroCHLOROthiazide (DIOVAN-HCT) 320-25 mg per tablet (Taking) TAKE 1 TABLET EVERY DAY    atorvastatin (LIPITOR) 10 mg tablet (Taking) Take 1 Tablet by mouth daily.     digoxin (LANOXIN) 0.125 mg tablet (Taking/Discontinued) Take 1 Tablet by mouth daily. furosemide (Lasix) 20 mg tablet (Taking) Take 1 Tablet by mouth daily as needed (edema). 1 tablet daily    aspirin 81 mg tablet (Taking) Take 1 Tab by mouth daily. BP Readings from Last 6 Encounters:   07/25/22 130/80   04/11/22 124/74   04/04/22 130/70   03/21/22 (!) 160/80   07/02/21 120/80   06/21/21 101/65        5. Tricuspid and mitral regurgitation  The tricuspid regurgitation is significant because it is part of the edema that she has with higher right-sided filling pressures. This treatment is diuretics and blood pressure control. The TR is functional secondary to volume overload on the right side of the heart. The MR is the substrate for her A. fib. It is not at this point requiring surgery or intervention. I do not think should benefit from MitraClip and tricuspid clipping is not likely to change the hemodynamics on the right side. I like her to see our nurse practitioner in 6 months and then see me back in a year with an echocardiogram just prior. Future Appointments   Date Time Provider Franchesca Godinez   7/25/2023 11:00 AM Dejon Fofana MD [facility]        Impression:   1. Apical variant hypertrophic cardiomyopathy ([address]    2. Chronic atrial fibrillation (HCC)    3. Chronic diastolic heart failure ([address]    4. Essential hypertension    5. Nonrheumatic mitral valve regurgitation    6. Nonrheumatic tricuspid valve regurgitation         Cardiac History:   Chronic atrial fibrillation   Diastolic CHF    ECHO 7-93-61= ef 55-60%, biatrial enlargement  ECHO 3- EF 70%, AK of apex with apical hypertrophy, mild TR,LAE, Pulm Htn    Admit Jan 2012 with asthma and May 2013; Chronic airway dz    Cardiac MRI     1. Apical hypertrophic cardiomyopathy. The apical septal wall measures 20   mm. The apical lateral and mid inferolateral wall measures 26 mm. Complete   obliteration of the LV apical cavity.  [place] like shape of the LV cavity   typical of the apical hypertrophic cardiomyopathy. Hyperdynamic left   ventricular systolic function with end-systolic cavity obliteration. 3-D   LVEF 86%. There is no systolic anterior motion of the mitral apparatus or no   LV outflow tract obstruction. 2. Normal right ventricular size and systolic function. RVEF 60%. 3. Mild 1+ mitral regurgitation. 4. Moderate 2+ tricuspid regurgitation. 5. Normal rest myocardial perfusion on first pass perfusion imaging. 6. On EGE and LGE imaging, there is no areas myocardial enhancement to   suggest any myocardial scar, recent or old myocardial infarction,   infiltration or inflammation. There is no features of myocardial sarcoidosis   or amyloidosis. There is no features of inflammatory cardiomyopathy such as   myocarditis. 7. Normal pleura and pericardium. Trace anterior and posterior pericardial   effusion. 8. Markedly dilated RA and LA. No future appointments. ROS-except as noted above. . A complete cardiac and respiratory are reviewed and negative except as above ; Resp-denies wheezing  or productive cough,. Const- No unusual weight loss or fever; Neuro-no recent seizure or CVA ; GI- No BRBPR, abdom pain, bloating ; - no  hematuria   see supplement sheet, initialed and to be scanned by staff  Past Medical History:   Diagnosis Date    Apical variant hypertrophic cardiomyopathy ([address]     Chronic diastolic heart failure (HCC)     HTN (hypertension)     Hx of mammogram     LVH (left ventricular hypertrophy) due to hypertensive disease     Menopause     LMP-34years old? Mild pulmonary hypertension ([address] 9/13/2012    Obstructive sleep apnea (adult) (pediatric) 07-    AHI: 5.0 per hour    Pneumonia 2013 admit [facility] Hx= reports that she quit smoking about 26 years ago. Her smoking use included cigarettes. She has a 2.00 pack-year smoking history.  She has never used smokeless tobacco. She reports that she does not drink alcohol and does not use drugs. Exam and Labs:  /80   Pulse 87   Resp 16   Ht 5' 3\" (1.6 m)   Wt 169 lb (76.7 kg)   SpO2 97%   BMI 29.94 kg/m² Constitutional:  NAD, comfortable  Head: NC,AT. Eyes: No scleral icterus. Neck:  Neck supple. No JVD present. Throat: moist mucous membranes. Chest: Effort normal & normal respiratory excursion . Neurological: alert, conversant and oriented . Skin: Skin is not cold. No obvious systemic rash noted. Not diaphoretic. No erythema. Psychiatric:  Grossly normal mood and affect. Behavior appears normal. Extremities:  no clubbing or cyanosis. Abdomen: non distended    Lungs:breath sounds normal. No stridor. distress, wheezes or  Rales. Heart: normal rate, regular rhythm, normal S1, S2, no murmurs, rubs, clicks or gallops , PMI non displaced. Edema: Edema is 1+ at the ankles only.   Lab Results   Component Value Date/Time    Cholesterol, total 112 03/21/2022 09:18 AM    HDL Cholesterol 44 03/21/2022 09:18 AM    LDL, calculated 50.2 03/21/2022 09:18 AM    Triglyceride 89 03/21/2022 09:18 AM    CHOL/HDL Ratio 2.5 03/21/2022 09:18 AM     Lab Results   Component Value Date/Time    Sodium 140 03/21/2022 09:18 AM    Potassium 3.7 03/21/2022 09:18 AM    Chloride 103 03/21/2022 09:18 AM    CO2 33 (H) 03/21/2022 09:18 AM    Anion gap 4 (L) 03/21/2022 09:18 AM    Glucose 103 (H) 03/21/2022 09:18 AM    BUN 18 03/21/2022 09:18 AM    Creatinine 0.80 03/21/2022 09:18 AM    BUN/Creatinine ratio 23 (H) 03/21/2022 09:18 AM    GFR est AA >60 03/21/2022 09:18 AM    GFR est non-AA >60 03/21/2022 09:18 AM    Calcium 9.1 03/21/2022 09:18 AM      Wt Readings from Last 3 Encounters:   07/25/22 169 lb (76.7 kg)   06/27/22 165 lb (74.8 kg)   04/11/22 165 lb 12.8 oz (75.2 kg)      BP Readings from Last 3 Encounters:   07/25/22 130/80   04/11/22 124/74   04/04/22 130/70      Current Outpatient Medications   Medication Sig    apixaban (Eliquis) 5 mg tablet Take 1 Tablet by mouth every twelve (12) hours. valsartan-hydroCHLOROthiazide (DIOVAN-HCT) 320-25 mg per tablet TAKE 1 TABLET EVERY DAY    Wixela Inhub 250-50 mcg/dose diskus inhaler TAKE 1 PUFF BY MOUTH TWICE A DAY    Blood-Glucose Meter (Accu-Chek Laney Plus Meter) misc Use as directed. Dx: E11.21    atenoloL (TENORMIN) 50 mg tablet Take 1 Tablet by mouth daily. atorvastatin (LIPITOR) 10 mg tablet Take 1 Tablet by mouth daily. True Metrix Glucose Meter monitoring kit USE AS DIRECTED.     glucose blood VI test strips (True Metrix Glucose Test Strip) strip USE AS DIRECTED. TRUEplus Lancets 33 gauge misc USE AS DIRECTED.       digoxin (LANOXIN) 0.125 mg tablet Take 1 Tablet by mouth daily. varicella-zoster recombinant, PF, (Shingrix, PF,) 50 mcg/0.5 mL susr injection 0.5 ml IM once and then repeat in 2-6 months. furosemide (Lasix) 20 mg tablet Take 1 Tablet by mouth daily as needed (edema). 1 tablet daily    lancets (Accu-Chek Softclix Lancets) misc Use as directed. Dx: E11.21    albuterol (PROVENTIL HFA, VENTOLIN HFA, PROAIR HFA) 90 mcg/actuation inhaler Take 1 Puff by inhalation every six (6) hours as needed for Wheezing. FLUTICASONE PROPIONATE (FLONASE NA) by Nasal route as needed. fexofenadine (ALLEGRA) 180 mg tablet Take 180 mg by mouth daily as needed for Allergies. triamcinolone acetonide (KENALOG) 0.1 % topical cream APPLY A THIN LAYER TO AFFECTED AREA(S) AS DIRECTED TWICE A DAY AS NEEDED    aspirin 81 mg tablet Take 1 Tab by mouth daily. cpap machine kit by Does Not Apply route nightly. alcohol swabs (BD Single Use Swabs Regular) padm USE AS DIRECTED ONCE DAILY     No current facility-administered medications for this visit. Impression see above.

## 2022-07-25 NOTE — Clinical Note
7/25/2022    Patient: Jaimie Garza   YOB: 1945   Date of Visit: 7/25/2022     Aminata Randhawa, [address] [facility]  [address] [place] [phone]  [facility] [facility] [address]    Dear Aminata Randhawa MD,      Thank you for referring Ms. Tiffanie Nunez to [facility] for evaluation. My notes for this consultation are attached. If you have questions, please do not hesitate to call me. I look forward to following your patient along with you.       Sincerely,    Jadyn Bowers MD

## 2022-08-10 DIAGNOSIS — I48.20 CHRONIC ATRIAL FIBRILLATION (HCC): ICD-10-CM

## 2022-08-11 RX ORDER — DIGOXIN 125 MCG
TABLET ORAL
Qty: 90 TABLET | Refills: 3 | Status: SHIPPED | OUTPATIENT
Start: 2022-08-11

## 2022-08-11 NOTE — TELEPHONE ENCOUNTER
Per VO by MD.    Future Appointments   Date Time Provider Franchesca Gretchen   7/25/2023 11:00 AM MD URSULA Knox AMB

## 2022-09-19 DIAGNOSIS — I34.0 NONRHEUMATIC MITRAL VALVE REGURGITATION: ICD-10-CM

## 2022-09-19 DIAGNOSIS — I50.32 CHRONIC DIASTOLIC HEART FAILURE (HCC): ICD-10-CM

## 2022-09-19 DIAGNOSIS — I48.20 CHRONIC ATRIAL FIBRILLATION (HCC): Primary | ICD-10-CM

## 2022-09-20 ENCOUNTER — TELEPHONE (OUTPATIENT)
Dept: CARDIOLOGY CLINIC | Age: 77
End: 2022-09-20

## 2022-09-20 NOTE — TELEPHONE ENCOUNTER
Needs an appt w/Paige for sometime in Jan 2023 for Edema per Dr Shanika Roach and then needs an Echo (dx: CHF) 1wk prior to her July 25 appt w/Ct.

## 2022-10-13 DIAGNOSIS — I10 ESSENTIAL HYPERTENSION: ICD-10-CM

## 2022-10-13 DIAGNOSIS — E11.9 CONTROLLED TYPE 2 DIABETES MELLITUS WITHOUT COMPLICATION, WITHOUT LONG-TERM CURRENT USE OF INSULIN (HCC): ICD-10-CM

## 2022-10-13 NOTE — TELEPHONE ENCOUNTER
Requested Prescriptions     Pending Prescriptions Disp Refills    valsartan-hydroCHLOROthiazide (DIOVAN-HCT) 320-25 mg per tablet 90 Tablet 1     Si Tablet daily.       [facility] Queved[place], [address] [place] [address]  [phone]

## 2022-10-14 RX ORDER — VALSARTAN AND HYDROCHLOROTHIAZIDE 320; 25 MG/1; MG/1
1 TABLET, FILM COATED ORAL DAILY
Qty: 90 TABLET | Refills: 1 | Status: SHIPPED | OUTPATIENT
Start: 2022-10-14

## 2022-10-20 PROBLEM — E11.9 HYPERTENSION COMPLICATING DIABETES (HCC): Status: ACTIVE | Noted: 2022-10-20

## 2022-10-20 PROBLEM — I15.2 HYPERTENSION COMPLICATING DIABETES (HCC): Status: ACTIVE | Noted: 2022-10-20

## 2022-10-20 PROBLEM — I10 HYPERTENSION COMPLICATING DIABETES (HCC): Status: ACTIVE | Noted: 2022-10-20

## 2022-10-20 PROBLEM — E11.59 HYPERTENSION COMPLICATING DIABETES (HCC): Status: ACTIVE | Noted: 2022-10-20

## 2022-11-02 PROBLEM — E11.22 TYPE 2 DIABETES MELLITUS WITH CHRONIC KIDNEY DISEASE (HCC): Status: ACTIVE | Noted: 2022-11-02

## 2022-11-29 RX ORDER — ATORVASTATIN CALCIUM 10 MG/1
TABLET, FILM COATED ORAL
Qty: 90 TABLET | Refills: 1 | OUTPATIENT
Start: 2022-11-29

## 2022-12-05 ENCOUNTER — TRANSCRIBE ORDER (OUTPATIENT)
Dept: SCHEDULING | Age: 77
End: 2022-12-05

## 2022-12-05 DIAGNOSIS — Z12.31 SCREENING MAMMOGRAM FOR HIGH-RISK PATIENT: Primary | ICD-10-CM

## 2022-12-21 ENCOUNTER — HOSPITAL ENCOUNTER (OUTPATIENT)
Dept: MAMMOGRAPHY | Age: 77
Discharge: HOME | End: 2022-12-21
Attending: FAMILY MEDICINE
Payer: MEDICARE

## 2022-12-21 DIAGNOSIS — Z12.31 SCREENING MAMMOGRAM FOR HIGH-RISK PATIENT: ICD-10-CM

## 2022-12-21 PROCEDURE — 77063 BREAST TOMOSYNTHESIS BI: CPT

## 2023-01-03 DIAGNOSIS — I48.20 CHRONIC ATRIAL FIBRILLATION (HCC): ICD-10-CM

## 2023-01-03 DIAGNOSIS — I10 ESSENTIAL HYPERTENSION: ICD-10-CM

## 2023-01-05 RX ORDER — ATENOLOL 50 MG/1
TABLET ORAL
Qty: 90 TABLET | Refills: 2 | Status: SHIPPED | OUTPATIENT
Start: 2023-01-05

## 2023-01-05 NOTE — TELEPHONE ENCOUNTER
Requested Prescriptions     Signed Prescriptions Disp Refills    atenoloL (TENORMIN) 50 mg tablet 90 Tablet 2     Sig: TAKE 1 TABLET BY MOUTH EVERY DAY IN THE MORNING     Authorizing Provider: Anay oY     Ordering User: Nishi Feldman     Verbal order per Dr. June Hicks.      Future Appointments   Date Time Provider Franchesca Godinez   1/9/2023 10:20 AM Ember Wing NP [facility]   7/17/2023  9:00 AM ROSETTA PUGH AMB   7/25/2023 11:00 AM Dejon Fofana MD [facility]

## 2023-01-09 ENCOUNTER — OFFICE VISIT (OUTPATIENT)
Dept: CARDIOLOGY CLINIC | Age: 78
End: 2023-01-09
Payer: MEDICARE

## 2023-01-09 VITALS
SYSTOLIC BLOOD PRESSURE: 130 MMHG | DIASTOLIC BLOOD PRESSURE: 72 MMHG | HEIGHT: 64 IN | HEART RATE: 68 BPM | RESPIRATION RATE: 14 BRPM | WEIGHT: 166.6 LBS | OXYGEN SATURATION: 97 % | BODY MASS INDEX: 28.44 KG/M2

## 2023-01-09 DIAGNOSIS — I34.0 NONRHEUMATIC MITRAL VALVE REGURGITATION: Primary | ICD-10-CM

## 2023-01-09 DIAGNOSIS — I42.2 APICAL VARIANT HYPERTROPHIC CARDIOMYOPATHY (HCC): ICD-10-CM

## 2023-01-09 DIAGNOSIS — I50.32 CHRONIC DIASTOLIC HEART FAILURE (HCC): ICD-10-CM

## 2023-01-09 DIAGNOSIS — I10 ESSENTIAL HYPERTENSION: ICD-10-CM

## 2023-01-09 DIAGNOSIS — I36.1 NONRHEUMATIC TRICUSPID VALVE REGURGITATION: ICD-10-CM

## 2023-01-09 DIAGNOSIS — I48.20 CHRONIC ATRIAL FIBRILLATION (HCC): ICD-10-CM

## 2023-01-09 DIAGNOSIS — Z79.01 ANTICOAGULANT LONG-TERM USE: ICD-10-CM

## 2023-01-09 PROCEDURE — G8432 DEP SCR NOT DOC, RNG: HCPCS | Performed by: NURSE PRACTITIONER

## 2023-01-09 PROCEDURE — 1101F PT FALLS ASSESS-DOCD LE1/YR: CPT | Performed by: NURSE PRACTITIONER

## 2023-01-09 PROCEDURE — G8536 NO DOC ELDER MAL SCRN: HCPCS | Performed by: NURSE PRACTITIONER

## 2023-01-09 PROCEDURE — 1123F ACP DISCUSS/DSCN MKR DOCD: CPT | Performed by: NURSE PRACTITIONER

## 2023-01-09 PROCEDURE — 3075F SYST BP GE 130 - 139MM HG: CPT | Performed by: NURSE PRACTITIONER

## 2023-01-09 PROCEDURE — G8417 CALC BMI ABV UP PARAM F/U: HCPCS | Performed by: NURSE PRACTITIONER

## 2023-01-09 PROCEDURE — 1090F PRES/ABSN URINE INCON ASSESS: CPT | Performed by: NURSE PRACTITIONER

## 2023-01-09 PROCEDURE — 99214 OFFICE O/P EST MOD 30 MIN: CPT | Performed by: NURSE PRACTITIONER

## 2023-01-09 PROCEDURE — G8399 PT W/DXA RESULTS DOCUMENT: HCPCS | Performed by: NURSE PRACTITIONER

## 2023-01-09 PROCEDURE — G8427 DOCREV CUR MEDS BY ELIG CLIN: HCPCS | Performed by: NURSE PRACTITIONER

## 2023-01-09 PROCEDURE — 3078F DIAST BP <80 MM HG: CPT | Performed by: NURSE PRACTITIONER

## 2023-01-09 RX ORDER — MELATONIN
1000 DAILY
COMMUNITY

## 2023-01-09 NOTE — PROGRESS NOTES
Gypsy Peña     1945         Date of Visit-1/9/2023   PCP is s, Monae Brantley MD   Cardiologist:  Shameka Ponce. [address] [facility], [address] [place] [address] Heart and [facility] [place]  [facility]    HPI:  Gypsy Peña is a 76 y.o. female     atrial fibrillation   apical hypertrophic cardiomyopathy   diastolic dysfunction causing SOB    Echo 6/27/2022 EF 55 to 60% apical variant hypertrophic cardiomyopathy which extends mainly from the apex or tip of the heart but then back to the rest of the heart, reduced RV function with TAPSE of 1.4 mild to moderate MR moderate to severe TR PASP is 53 dilated atria    Today she is a little anxious, BP initially elevated but well controlled on recheck  BP at home usually 130/80  No headaches, dizziness or lightheadedness  No chest pain or dyspnea with exertion  No palpitations  LE edema is doing very well, rarely takes lasix PRN  Labs 11/2/22 looked good      Assessment/Plan:       1. Apical variant hypertrophic cardiomyopathy ([address]  MRI done in June 2015 confirmed an apical septal wall at 20 mm with typical shape noted  Echo 6/22 also showed apical variant HCM - per Dr. Kindra Locke this carries a fairly benign prognosis when confined to apex and not global  EKG findings previously consistent with hypertrophy. Continue atenolol    2. Chronic atrial fibrillation (HCC)  Continue atenolol for rate control, continue eliquis for anticoagulation   [id number]    3. Chronic diastolic heart failure (HCC)  Compensated NYHA Class I   BP well controlled at home, very little edema, takes Lasix PRN rarely   Has a component of venous insufficiency       4.  Essential hypertension  At goal , meds and possible side effects reviewed and patient denies  Key CAD CHF Meds               atenoloL (TENORMIN) 50 mg tablet (Taking) TAKE 1 TABLET BY MOUTH EVERY DAY IN THE MORNING    digoxin (LANOXIN) 0.125 mg tablet (Taking) TAKE 1 TABLET EVERY DAY    valsartan-hydroCHLOROthiazide (DIOVAN-HCT) 320-25 mg per tablet (Taking) Take 1 Tablet by mouth daily. apixaban (Eliquis) 5 mg tablet (Taking) Take 1 Tablet by mouth every twelve (12) hours. atorvastatin (LIPITOR) 10 mg tablet (Taking) Take 1 Tablet by mouth daily. furosemide (Lasix) 20 mg tablet (Taking) Take 1 Tablet by mouth daily as needed (edema). 1 tablet daily    aspirin 81 mg tablet (Taking) Take 1 Tab by mouth daily. BP Readings from Last 6 Encounters:   01/09/23 130/72   11/02/22 130/88   10/20/22 139/80   07/25/22 130/80   04/11/22 124/74   04/04/22 130/70        5. Tricuspid and mitral regurgitation  The tricuspid regurgitation is significant because it is part of the edema that she has with higher right-sided filling pressures. The treatment is diuretics and blood pressure control. The TR is functional secondary to volume overload on the right side of the heart. The MR is the substrate for her AFib. It is not at this point requiring surgery or intervention. I do not think she would benefit from MitraClip and tricuspid clipping is not likely to change the hemodynamics on the right side. Continue lasix PRN  Repeat echo planned for July 2023 at follow up with Dr. Levon Minaya   Date Time Provider Franchesca Godinez   7/17/2023  9:00 AM LEXY, ROSETTA CHAUDHRY   7/25/2023 11:00 AM Dejon Fofana MD [facility]     Impression:   1. Nonrheumatic mitral valve regurgitation    2. Chronic atrial fibrillation (HCC)    3. Chronic diastolic heart failure (HCC)    4. Apical variant hypertrophic cardiomyopathy ([address]    5. Essential hypertension    6. Anticoagulant long-term use    7. Nonrheumatic tricuspid valve regurgitation        Cardiac History:     ECHO ADULT COMPLETE 06/29/2022 6/29/2022    Interpretation Summary    Left Ventricle: Normal left ventricular systolic function with a visually estimated EF of 55 - 60%. Left ventricle size is normal. Increased wall thickness.  See diagram for wall motion findings. Echocardiographic features are suggestive of hypertrophic (apical variant) cardiomyopathy. Right Ventricle: Reduced systolic function. TAPSE is abnormal. TAPSE is 1.4 cm. Mitral Valve: Mild to moderate regurgitation. Tricuspid Valve: Moderate to severe regurgitation with an eccentrically directed jet and may underestimate severity. Mildly elevated RVSP. The estimated RVSP is 53 mmHg. Left Atrium: Left atrium is severely dilated. Left atrial volume index is severely increased (>48 mL/m2). Right Atrium: Right atrium is dilated. Signed by: Yuli David MD on 6/29/2022  5:46 PM    Chronic atrial fibrillation   Diastolic CHF    ECHO 4-23-41= ef 55-60%, biatrial enlargement  ECHO 3- EF 70%, AK of apex with apical hypertrophy, mild TR,LAE, Pulm Htn    Admit Jan 2012 with asthma and May 2013; Chronic airway dz    Cardiac MRI     1. Apical hypertrophic cardiomyopathy. The apical septal wall measures 20   mm. The apical lateral and mid inferolateral wall measures 26 mm. Complete   obliteration of the LV apical cavity. [place] like shape of the LV cavity   typical of the apical hypertrophic cardiomyopathy. Hyperdynamic left   ventricular systolic function with end-systolic cavity obliteration. 3-D   LVEF 86%. There is no systolic anterior motion of the mitral apparatus or no   LV outflow tract obstruction. 2. Normal right ventricular size and systolic function. RVEF 60%. 3. Mild 1+ mitral regurgitation. 4. Moderate 2+ tricuspid regurgitation. 5. Normal rest myocardial perfusion on first pass perfusion imaging. 6. On EGE and LGE imaging, there is no areas myocardial enhancement to   suggest any myocardial scar, recent or old myocardial infarction,   infiltration or inflammation. There is no features of myocardial sarcoidosis   or amyloidosis. There is no features of inflammatory cardiomyopathy such as   myocarditis. 7. Normal pleura and pericardium.  Trace anterior and posterior pericardial   effusion. 8. Markedly dilated RA and LA. ROS-except as noted above. . A complete cardiac and respiratory are reviewed and negative except as above ; Resp-denies wheezing  or productive cough,. Const- No unusual weight loss or fever; Neuro-no recent seizure or CVA ; GI- No BRBPR, abdom pain, bloating ; - no  hematuria     Past Medical History:   Diagnosis Date    Apical variant hypertrophic cardiomyopathy (HCC)     Chronic diastolic heart failure (HCC)     HTN (hypertension)     Hx of mammogram     LVH (left ventricular hypertrophy) due to hypertensive disease     Menopause     LMP-34years old? Mild pulmonary hypertension ([facility] 9/13/2012    Obstructive sleep apnea (adult) (pediatric) 07-    AHI: 5.0 per hour    Pneumonia 2013 admit [address] [facility] Hx= reports that she quit smoking about 26 years ago. Her smoking use included cigarettes. She has a 2.00 pack-year smoking history. She has never used smokeless tobacco. She reports that she does not drink alcohol and does not use drugs. Exam and Labs:  /72   Pulse 68   Resp 14   Ht 5' 4\" (1.626 m)   Wt 166 lb 9.6 oz (75.6 kg)   SpO2 97%   BMI 28.60 kg/m² Constitutional:  NAD, comfortable  Head: NC,AT. Eyes: No scleral icterus. Neck:  Neck supple. No JVD present. Throat: moist mucous membranes. Chest: Effort normal & normal respiratory excursion . Neurological: alert, conversant and oriented . Skin: Skin is not cold. No obvious systemic rash noted. Not diaphoretic. No erythema. Psychiatric:  Grossly normal mood and affect. Behavior appears normal. Extremities:  no clubbing or cyanosis. Abdomen: non distended    Lungs:breath sounds normal. No stridor. distress, wheezes or rales   Heart: normal rate, regular rhythm, normal S1, S2, no murmurs, rubs, clicks or gallops , PMI non displaced.     Edema: trace LE edema bilaterally     Lab Results   Component Value Date/Time    Cholesterol, total 117 11/02/2022 10:32 AM    HDL Cholesterol 44 11/02/2022 10:32 AM    LDL, calculated 57 11/02/2022 10:32 AM    LDL, calculated 50.2 03/21/2022 09:18 AM    Triglyceride 81 11/02/2022 10:32 AM    CHOL/HDL Ratio 2.5 03/21/2022 09:18 AM     Lab Results   Component Value Date/Time    Sodium 143 11/02/2022 10:32 AM    Potassium 4.1 11/02/2022 10:32 AM    Chloride 100 11/02/2022 10:32 AM    CO2 27 11/02/2022 10:32 AM    Anion gap 4 (L) 03/21/2022 09:18 AM    Glucose 99 11/02/2022 10:32 AM    BUN 15 11/02/2022 10:32 AM    Creatinine 0.86 11/02/2022 10:32 AM    BUN/Creatinine ratio 17 11/02/2022 10:32 AM    GFR est AA >60 03/21/2022 09:18 AM    GFR est non-AA >60 03/21/2022 09:18 AM    Calcium 9.5 11/02/2022 10:32 AM      Wt Readings from Last 3 Encounters:   01/09/23 166 lb 9.6 oz (75.6 kg)   11/02/22 165 lb (74.8 kg)   10/20/22 168 lb (76.2 kg)      BP Readings from Last 3 Encounters:   01/09/23 130/72   11/02/22 130/88   10/20/22 139/80      Current Outpatient Medications   Medication Sig    cholecalciferol (Vitamin D3) (1000 Units /25 mcg) tablet Take 1,000 Units by mouth daily. atenoloL (TENORMIN) 50 mg tablet TAKE 1 TABLET BY MOUTH EVERY DAY IN THE MORNING    digoxin (LANOXIN) 0.125 mg tablet TAKE 1 TABLET EVERY DAY    valsartan-hydroCHLOROthiazide (DIOVAN-HCT) 320-25 mg per tablet Take 1 Tablet by mouth daily. apixaban (Eliquis) 5 mg tablet Take 1 Tablet by mouth every twelve (12) hours. alcohol swabs (BD Single Use Swabs Regular) padm USE AS DIRECTED ONCE DAILY    Wixela Inhub 250-50 mcg/dose diskus inhaler TAKE 1 PUFF BY MOUTH TWICE A DAY    Blood-Glucose Meter (Accu-Chek Laney Plus Meter) misc Use as directed. Dx: E11.21    atorvastatin (LIPITOR) 10 mg tablet Take 1 Tablet by mouth daily. True Metrix Glucose Meter monitoring kit USE AS DIRECTED.     glucose blood VI test strips (True Metrix Glucose Test Strip) strip USE AS DIRECTED. TRUEplus Lancets 33 gauge misc USE AS DIRECTED.        furosemide (Lasix) 20 mg tablet Take 1 Tablet by mouth daily as needed (edema). 1 tablet daily    lancets (Accu-Chek Softclix Lancets) misc Use as directed. Dx: E11.21    albuterol (PROVENTIL HFA, VENTOLIN HFA, PROAIR HFA) 90 mcg/actuation inhaler Take 1 Puff by inhalation every six (6) hours as needed for Wheezing. FLUTICASONE PROPIONATE (FLONASE NA) by Nasal route as needed. fexofenadine (ALLEGRA) 180 mg tablet Take 180 mg by mouth daily as needed for Allergies. triamcinolone acetonide (KENALOG) 0.1 % topical cream APPLY A THIN LAYER TO AFFECTED AREA(S) AS DIRECTED TWICE A DAY AS NEEDED    aspirin 81 mg tablet Take 1 Tab by mouth daily. cpap machine kit by Does Not Apply route nightly. No current facility-administered medications for this visit.      Rashid Brantley, NATA, [facility]   [address] [facility], [address] [place] [address],[address]  [address]  (d) [phone] (vit) [phone]

## 2023-01-29 DIAGNOSIS — I10 ESSENTIAL HYPERTENSION: ICD-10-CM

## 2023-01-29 DIAGNOSIS — I50.32 CHRONIC DIASTOLIC HEART FAILURE (HCC): ICD-10-CM

## 2023-01-29 DIAGNOSIS — J44.1 CHRONIC OBSTRUCTIVE PULMONARY DISEASE WITH ACUTE EXACERBATION (HCC): ICD-10-CM

## 2023-01-29 DIAGNOSIS — I10 ESSENTIAL HYPERTENSION, BENIGN: ICD-10-CM

## 2023-01-29 DIAGNOSIS — E11.21 TYPE 2 DIABETES WITH NEPHROPATHY (HCC): ICD-10-CM

## 2023-01-29 DIAGNOSIS — E11.9 CONTROLLED TYPE 2 DIABETES MELLITUS WITHOUT COMPLICATION, WITHOUT LONG-TERM CURRENT USE OF INSULIN (HCC): ICD-10-CM

## 2023-01-29 DIAGNOSIS — I48.20 CHRONIC ATRIAL FIBRILLATION (HCC): ICD-10-CM

## 2023-02-01 RX ORDER — ALBUTEROL SULFATE 90 UG/1
1 AEROSOL, METERED RESPIRATORY (INHALATION)
Qty: 1 EACH | Refills: 5 | Status: SHIPPED | OUTPATIENT
Start: 2023-02-01

## 2023-02-01 RX ORDER — VALSARTAN AND HYDROCHLOROTHIAZIDE 320; 25 MG/1; MG/1
1 TABLET, FILM COATED ORAL DAILY
Qty: 90 TABLET | Refills: 1 | Status: SHIPPED | OUTPATIENT
Start: 2023-02-01

## 2023-02-01 RX ORDER — FLUTICASONE PROPIONATE AND SALMETEROL 250; 50 UG/1; UG/1
POWDER RESPIRATORY (INHALATION)
Qty: 180 EACH | Refills: 1 | Status: SHIPPED | OUTPATIENT
Start: 2023-02-01

## 2023-02-01 RX ORDER — MINERAL OIL
180 ENEMA (ML) RECTAL
Qty: 90 TABLET | Refills: 4 | Status: SHIPPED | OUTPATIENT
Start: 2023-02-01

## 2023-02-01 RX ORDER — APIXABAN 5 MG/1
TABLET, FILM COATED ORAL
Qty: 60 TABLET | Refills: 3 | Status: SHIPPED | OUTPATIENT
Start: 2023-02-01

## 2023-02-01 RX ORDER — MELATONIN
1000 DAILY
Qty: 90 TABLET | Refills: 2 | Status: SHIPPED | OUTPATIENT
Start: 2023-02-01

## 2023-02-01 RX ORDER — FUROSEMIDE 20 MG/1
20 TABLET ORAL
Qty: 90 TABLET | Refills: 1 | Status: SHIPPED | OUTPATIENT
Start: 2023-02-01

## 2023-05-20 RX ORDER — DIGOXIN 125 MCG
1 TABLET ORAL DAILY
COMMUNITY
Start: 2023-01-24

## 2023-05-20 RX ORDER — VALSARTAN AND HYDROCHLOROTHIAZIDE 320; 25 MG/1; MG/1
1 TABLET, FILM COATED ORAL DAILY
COMMUNITY
Start: 2023-02-01

## 2023-05-20 RX ORDER — TRIAMCINOLONE ACETONIDE 1 MG/G
CREAM TOPICAL
COMMUNITY
Start: 2017-05-17

## 2023-05-20 RX ORDER — FLUTICASONE PROPIONATE AND SALMETEROL 250; 50 UG/1; UG/1
POWDER RESPIRATORY (INHALATION)
COMMUNITY
Start: 2023-02-23

## 2023-05-20 RX ORDER — FEXOFENADINE HCL 180 MG/1
180 TABLET ORAL DAILY PRN
COMMUNITY
Start: 2023-02-01

## 2023-05-20 RX ORDER — ATORVASTATIN CALCIUM 10 MG/1
10 TABLET, FILM COATED ORAL DAILY
COMMUNITY
Start: 2023-01-24

## 2023-05-20 RX ORDER — ATENOLOL 50 MG/1
1 TABLET ORAL EVERY MORNING
COMMUNITY
Start: 2023-01-05

## 2023-05-20 RX ORDER — FUROSEMIDE 20 MG/1
20 TABLET ORAL DAILY PRN
COMMUNITY
Start: 2023-02-01

## 2023-05-20 RX ORDER — ALBUTEROL SULFATE 90 UG/1
1 AEROSOL, METERED RESPIRATORY (INHALATION) EVERY 6 HOURS PRN
COMMUNITY
Start: 2023-02-01

## 2023-06-21 ENCOUNTER — TELEPHONE (OUTPATIENT)
Age: 78
End: 2023-06-21

## 2023-07-24 ENCOUNTER — CLINICAL DOCUMENTATION (OUTPATIENT)
Age: 78
End: 2023-07-24

## 2023-07-24 NOTE — PROGRESS NOTES
Echo results per Dr. Bam Lawson    Her Hypertrophic CMY has progressed significantly and now extends to nearly the base so we cant just call it apical anymore. This metamorphisis can occur. She also continues to have severe TR and some RV failure. I would like her to see FS Dr Charlotte Hurley and get an MRI of the heart.      Asked [place] to set up phone call between patient and Dr. Bam Lawson to discuss

## 2023-07-25 ENCOUNTER — TELEPHONE (OUTPATIENT)
Age: 78
End: 2023-07-25

## 2023-07-25 DIAGNOSIS — I50.810 RIGHT VENTRICULAR FAILURE (HCC): ICD-10-CM

## 2023-07-25 DIAGNOSIS — I42.2 OTHER HYPERTROPHIC CARDIOMYOPATHY (HCC): Primary | ICD-10-CM

## 2023-07-25 DIAGNOSIS — I07.1 SEVERE TRICUSPID REGURGITATION: ICD-10-CM

## 2023-07-25 NOTE — TELEPHONE ENCOUNTER
----- Message from Sushil Sanchez MD sent at 7/23/2023  9:50 PM EDT -----  Her Hypertrophic CMY has progressed significantly and now extends to nearly the base so we cant just call it apical anymore. This metamorphisis can occur. She also continues to have severe TR and some RV failure. I would like her to see FS Dr Janelle oV and get an MRI of the heart.   Let her know those details and I am glad to talk to her by phone too this week, just set up a phone or video visit time, I am sure she has several questions  Thanks  Future Appointments  10/23/2023 2:00 PM    MD CHARLES Rosas AMB

## 2023-07-26 ENCOUNTER — TELEPHONE (OUTPATIENT)
Age: 78
End: 2023-07-26

## 2023-07-27 NOTE — TELEPHONE ENCOUNTER
Verified patient using two identifiers. Advised patient of abnormal echo results and setup VV with Dr. Precious Walton for tomorrow. Patient verbalized understanding, no further questions.     Future Appointments   Date Time Provider [address]   7/28/2023  2:00 PM MD CHARLES Calixto AMB   10/23/2023  2:00 PM MD CHARLES Calixto AMB

## 2023-07-28 ENCOUNTER — TELEMEDICINE (OUTPATIENT)
Age: 78
End: 2023-07-28
Payer: MEDICARE

## 2023-07-28 DIAGNOSIS — I42.2 HYPERTROPHIC CARDIOMYOPATHY (HCC): Primary | ICD-10-CM

## 2023-07-28 DIAGNOSIS — I48.20 CHRONIC ATRIAL FIBRILLATION (HCC): ICD-10-CM

## 2023-07-28 DIAGNOSIS — Z79.01 LONG TERM (CURRENT) USE OF ANTICOAGULANTS: ICD-10-CM

## 2023-07-28 DIAGNOSIS — I50.32 CHRONIC DIASTOLIC HEART FAILURE (HCC): ICD-10-CM

## 2023-07-28 DIAGNOSIS — E78.00 PURE HYPERCHOLESTEROLEMIA: ICD-10-CM

## 2023-07-28 DIAGNOSIS — I07.1 SEVERE TRICUSPID REGURGITATION: ICD-10-CM

## 2023-07-28 DIAGNOSIS — I10 ESSENTIAL (PRIMARY) HYPERTENSION: ICD-10-CM

## 2023-07-28 DIAGNOSIS — I50.810 RIGHT VENTRICULAR FAILURE (HCC): ICD-10-CM

## 2023-07-28 PROCEDURE — 99441 PR PHYS/QHP TELEPHONE EVALUATION 5-10 MIN: CPT | Performed by: SPECIALIST

## 2023-07-28 NOTE — PROGRESS NOTES
of 1.3cm at the base of the LV. There is progression of the hypertrophy towards the base with only the most basal segments spared. Normal wall motion. Normal diastolic function. Echocardiographic features are suggestive of hypertrophic cardiomyopathy. Tricuspid Valve: Severe regurgitation with jet direction toward the septum. Right Ventricle: Mildly reduced systolic function. TAPSE is abnormal. TAPSE is 1.4 cm. Mitral Valve: Mild regurgitation. Pulmonic Valve: Mild regurgitation. Left Atrium: Left atrium is severely dilated. LA Vol Index A/L is 155 mL/m2. Right Atrium: Right atrium is severely dilated. Signed by: Berline Dubin, MD on 7/23/2023  9:47 PM     No results found for this or any previous visit. Future Appointments   Date Time Provider [address]   10/23/2023  2:00 PM Berline Dubin, MD [facility]        No specialty comments available. Past Medical History:   Diagnosis Date    Apical variant hypertrophic cardiomyopathy (HCC)     Chronic diastolic heart failure (HCC)     HTN (hypertension)     Hx of mammogram     LVH (left ventricular hypertrophy) due to hypertensive disease     Menopause     LMP-34years old? Mild pulmonary hypertension ([address]) 9/13/2012    Obstructive sleep apnea (adult) (pediatric) 07-    AHI: 5.0 per hour    Pneumonia 2013 admit Esperanza Patel     reports that she quit smoking about 27 years ago. Her smoking use included cigarettes. She smoked an average of .25 packs per day. She has never used smokeless tobacco. She reports that she does not drink alcohol and does not use drugs. family history includes Asthma in her sister; Breast Cancer in her maternal aunt and sister; Cancer in her maternal aunt; Diabetes in her sister; Headache in her sister; Hypertension in her father, mother, and sister; No Known Problems in her daughter; Other in her niece and niece; Rheum Arthritis in her mother.    Current Outpatient Medications   Medication Sig

## 2023-08-02 ENCOUNTER — TELEPHONE (OUTPATIENT)
Age: 78
End: 2023-08-02

## 2023-08-02 NOTE — TELEPHONE ENCOUNTER
New Patient referred by DR. Rojas scheduled for 8/8/23 at 10:00 with Dr. Delicia Mai. I gave patient address to [facility] and directions to clinic. Advised her to arrive 15 minutes early for registration. Also advised to bring all medications in bottles. Phone number given for clinic for any questions. They state understanding of all instructions and have no questions.

## 2023-08-04 NOTE — PROGRESS NOTES
[address] in [address] Note    Patient name: Rey Blount  Patient : 1945  Patient MRN: [id number]  Date of service: 23    Primary care physician: Alena Rudolph MD  Primary cardiologist: Raquel Edwards MD  Primary [facility] cardiologist: Elma Winston MD      CHIEF COMPLAINT:  New referral for hypertrophic cardiomyopathy    ASSESSMENT:  Rey Blount is a 78 y.o. female with a history of chronic diastolic heart failure and apical variant hypertrophic cardiomyopathy. More recently has had increase of LV hypertrophy into the mid and basal LV walls. Pending cardiac MRI for further evaluation. PLAN:  Hypertrophic cardiomyopathy:  Echocardiogram reviewed. There are no signs of LVOT obstruction at rest. LVOT peak gradient 7 mmHg. She does have mid cavity obliteration due to mid cavity hypertrophy. Patient has NYHA class I-II symptoms, functional status limited by musculoskeletal disease. MRI is pending. Will evaluate for obstructive physiology. If signs of obstruction present, recommend repeating a limited echo to evaluate for obstruction with provocation. Hyla Menghini is currently only indicated in symptomatic obstructive hypertrophic cardiomyopathy (LVOT>50 mmHg in trial, >30 mmHg ACC/AHA definition of obstruction)    There is a clinical trial at [facility] in HCM without obstruction and I discussed this with the patient today. She is not interested in traveling to [facility]. Continue Atenolol 50 mg daily. Could titrate to HR<60. She is not requiring regular diuretic use for volume management. Given echo findings, encourage fluid intake and limit diuretics. Given age an no prior history of syncope or aborted SCD, low risk for SCD. We will perform genetic testing today. Advanced Care Plan forms provided today.    Recommend flu, covid and pneumonia vaccinations  Follow-up with primary

## 2023-08-08 ENCOUNTER — OFFICE VISIT (OUTPATIENT)
Age: 78
End: 2023-08-08
Payer: MEDICARE

## 2023-08-08 VITALS
BODY MASS INDEX: 27.49 KG/M2 | RESPIRATION RATE: 16 BRPM | DIASTOLIC BLOOD PRESSURE: 78 MMHG | HEART RATE: 81 BPM | HEIGHT: 64 IN | OXYGEN SATURATION: 97 % | TEMPERATURE: 98.7 F | WEIGHT: 161 LBS | SYSTOLIC BLOOD PRESSURE: 112 MMHG

## 2023-08-08 DIAGNOSIS — I50.32 CHRONIC DIASTOLIC HEART FAILURE (HCC): ICD-10-CM

## 2023-08-08 DIAGNOSIS — I42.2 APICAL VARIANT HYPERTROPHIC CARDIOMYOPATHY (HCC): Primary | ICD-10-CM

## 2023-08-08 PROCEDURE — 99203 OFFICE O/P NEW LOW 30 MIN: CPT | Performed by: INTERNAL MEDICINE

## 2023-08-08 PROCEDURE — 3074F SYST BP LT 130 MM HG: CPT | Performed by: INTERNAL MEDICINE

## 2023-08-08 PROCEDURE — 3078F DIAST BP <80 MM HG: CPT | Performed by: INTERNAL MEDICINE

## 2023-08-08 RX ORDER — ASPIRIN 81 MG/1
81 TABLET ORAL DAILY
COMMUNITY

## 2023-08-08 RX ORDER — TRAMADOL HYDROCHLORIDE 50 MG/1
50 TABLET ORAL EVERY 6 HOURS PRN
COMMUNITY

## 2023-08-08 ASSESSMENT — PATIENT HEALTH QUESTIONNAIRE - PHQ9
SUM OF ALL RESPONSES TO PHQ QUESTIONS 1-9: 0
SUM OF ALL RESPONSES TO PHQ9 QUESTIONS 1 & 2: 0
SUM OF ALL RESPONSES TO PHQ QUESTIONS 1-9: 0
1. LITTLE INTEREST OR PLEASURE IN DOING THINGS: 0
SUM OF ALL RESPONSES TO PHQ QUESTIONS 1-9: 0
SUM OF ALL RESPONSES TO PHQ QUESTIONS 1-9: 0
2. FEELING DOWN, DEPRESSED OR HOPELESS: 0

## 2023-08-21 ENCOUNTER — TELEPHONE (OUTPATIENT)
Age: 78
End: 2023-08-21

## 2023-08-21 NOTE — TELEPHONE ENCOUNTER
Received results of [facility] genetic testing: VUS. Letter sent to patient and copy placed in chart in media.

## 2023-08-29 ENCOUNTER — TELEPHONE (OUTPATIENT)
Age: 78
End: 2023-08-29

## 2023-09-03 NOTE — ADDENDUM NOTE
Addended by: Case Price on: 2/7/2022 12:45 PM     Modules accepted: Orders Patient presents from home with complaints of worsening chest pain since receiving CPR Monday. Patient reports he received the CPR for an overdose and was not seen at a hospital. Reports he got CPR for at least 5-10 mins.  Patient is complaining of severe chest pain that is worse when he takes a deep breath

## 2023-10-03 ENCOUNTER — HOSPITAL ENCOUNTER (OUTPATIENT)
Facility: HOSPITAL | Age: 78
Discharge: HOME | End: 2023-10-06
Attending: SPECIALIST
Payer: MEDICARE

## 2023-10-03 DIAGNOSIS — I07.1 SEVERE TRICUSPID REGURGITATION: ICD-10-CM

## 2023-10-03 DIAGNOSIS — I42.2 OTHER HYPERTROPHIC CARDIOMYOPATHY (HCC): ICD-10-CM

## 2023-10-03 DIAGNOSIS — I50.810 RIGHT VENTRICULAR FAILURE (HCC): ICD-10-CM

## 2023-10-03 PROCEDURE — 75561 CARDIAC MRI FOR MORPH W/DYE: CPT | Performed by: INTERNAL MEDICINE

## 2023-10-03 PROCEDURE — A9579 GAD-BASE MR CONTRAST NOS,1ML: HCPCS | Performed by: SPECIALIST

## 2023-10-03 PROCEDURE — 75561 CARDIAC MRI FOR MORPH W/DYE: CPT

## 2023-10-03 PROCEDURE — 6360000004 HC RX CONTRAST MEDICATION: Performed by: SPECIALIST

## 2023-10-03 RX ADMIN — GADOTERIDOL 22 ML: 279.3 INJECTION, SOLUTION INTRAVENOUS at 08:09

## 2023-10-15 DIAGNOSIS — I10 ESSENTIAL (PRIMARY) HYPERTENSION: ICD-10-CM

## 2023-10-15 DIAGNOSIS — I48.20 CHRONIC ATRIAL FIBRILLATION, UNSPECIFIED (HCC): ICD-10-CM

## 2023-10-16 RX ORDER — ATENOLOL 50 MG/1
50 TABLET ORAL EVERY MORNING
Qty: 90 TABLET | Refills: 3 | Status: SHIPPED | OUTPATIENT
Start: 2023-10-16

## 2023-10-16 NOTE — TELEPHONE ENCOUNTER
Per VO by MD.    Future Appointments   Date Time Provider [address]   10/24/2023  3:30 PM [facility]   11/3/2023 10:00 AM Masters, Luna Chen MD [facility]   12/27/2023  2:20 PM MD CHARLES Rodney BS AMB

## 2023-10-24 ENCOUNTER — OFFICE VISIT (OUTPATIENT)
Age: 78
End: 2023-10-24
Payer: MEDICARE

## 2023-10-24 VITALS
OXYGEN SATURATION: 100 % | RESPIRATION RATE: 16 BRPM | HEIGHT: 64 IN | HEART RATE: 77 BPM | BODY MASS INDEX: 27.72 KG/M2 | WEIGHT: 162.4 LBS | DIASTOLIC BLOOD PRESSURE: 78 MMHG | TEMPERATURE: 97.8 F | SYSTOLIC BLOOD PRESSURE: 132 MMHG

## 2023-10-24 DIAGNOSIS — I50.32 CHRONIC DIASTOLIC HEART FAILURE (HCC): Primary | ICD-10-CM

## 2023-10-24 DIAGNOSIS — Z23 NEEDS FLU SHOT: ICD-10-CM

## 2023-10-24 PROCEDURE — G0008 ADMIN INFLUENZA VIRUS VAC: HCPCS | Performed by: NURSE PRACTITIONER

## 2023-10-24 PROCEDURE — 99214 OFFICE O/P EST MOD 30 MIN: CPT | Performed by: NURSE PRACTITIONER

## 2023-10-24 PROCEDURE — 3078F DIAST BP <80 MM HG: CPT | Performed by: NURSE PRACTITIONER

## 2023-10-24 PROCEDURE — 1123F ACP DISCUSS/DSCN MKR DOCD: CPT | Performed by: NURSE PRACTITIONER

## 2023-10-24 PROCEDURE — G8419 CALC BMI OUT NRM PARAM NOF/U: HCPCS | Performed by: NURSE PRACTITIONER

## 2023-10-24 PROCEDURE — G8428 CUR MEDS NOT DOCUMENT: HCPCS | Performed by: NURSE PRACTITIONER

## 2023-10-24 PROCEDURE — 1036F TOBACCO NON-USER: CPT | Performed by: NURSE PRACTITIONER

## 2023-10-24 PROCEDURE — G8484 FLU IMMUNIZE NO ADMIN: HCPCS | Performed by: NURSE PRACTITIONER

## 2023-10-24 PROCEDURE — G8399 PT W/DXA RESULTS DOCUMENT: HCPCS | Performed by: NURSE PRACTITIONER

## 2023-10-24 PROCEDURE — 3075F SYST BP GE 130 - 139MM HG: CPT | Performed by: NURSE PRACTITIONER

## 2023-10-24 PROCEDURE — 1090F PRES/ABSN URINE INCON ASSESS: CPT | Performed by: NURSE PRACTITIONER

## 2023-10-24 PROCEDURE — 90694 VACC AIIV4 NO PRSRV 0.5ML IM: CPT | Performed by: NURSE PRACTITIONER

## 2023-10-24 ASSESSMENT — PATIENT HEALTH QUESTIONNAIRE - PHQ9
2. FEELING DOWN, DEPRESSED OR HOPELESS: 1
1. LITTLE INTEREST OR PLEASURE IN DOING THINGS: 0
SUM OF ALL RESPONSES TO PHQ QUESTIONS 1-9: 1
SUM OF ALL RESPONSES TO PHQ9 QUESTIONS 1 & 2: 1
SUM OF ALL RESPONSES TO PHQ QUESTIONS 1-9: 1

## 2023-10-24 ASSESSMENT — ENCOUNTER SYMPTOMS
ABDOMINAL DISTENTION: 0
COUGH: 0
SHORTNESS OF BREATH: 0
NAUSEA: 0
VOMITING: 0

## 2023-10-24 NOTE — PROGRESS NOTES
[address] in [facility], [address] [place] [address] Note    Patient name: Rey Blount  Patient : 1945  Patient MRN: [id number]  Date of service: 10/24/23    Primary care physician: Aleshia Delacruz MD  Primary cardiologist: Raquel Edwards MD  Primary [facility] cardiologist: Elma Winston MD      Chief Complaint   Patient presents with    Palpitations     \"Only when I get upset or nervous\"         ASSESSMENT:  Rey Blount is a 78 y.o. female with a history of chronic diastolic heart failure and apical variant hypertrophic cardiomyopathy. More recently has had increase of LV hypertrophy into the mid and basal LV walls. CMRI in 2023 shows non obstructive HCM at this time, she is not interested in a clinical trial and [facility] and we will focus on symptom management. PLAN:  Hypertrophic cardiomyopathy:  Echocardiogram reviewed. There are no signs of LVOT obstruction at rest. LVOT peak gradient 7 mmHg. She does have mid cavity obliteration due to mid cavity hypertrophy. Patient has NYHA class I-II symptoms, functional status limited by musculoskeletal disease. Hyla Menghini is currently only indicated in symptomatic obstructive hypertrophic cardiomyopathy (LVOT>50 mmHg in trial, >30 mmHg ACC/AHA definition of obstruction)    There is a clinical trial at [facility] in HCM without obstruction she remains not interested in traveling to [address] [place] [address]. Continue Atenolol 50 mg daily. Could titrate to HR<60. She is not requiring regular diuretic use for volume management. Given echo findings, encourage fluid intake and limit diuretics. Given age an no prior history of syncope or aborted SCD, low risk for SCD. Reviewed genetic testing today- VUS x 2, recommended family testing and information provided      Advanced Care Plan forms provided today.    Flu vaccine today  in clinic  Needs updated covid and Shingles/RSV  Up to date on pneumonia

## 2023-10-24 NOTE — PATIENT INSTRUCTIONS
swelling, or if your blood pressure begins to consistently run below 90/60 and/or you begin to experience dizziness or lightheadedness please contact our office at [phone] option 2. Ensure your drinking an adequate amount of water with a goal of 6-8 eight ounce glasses (1.5-2 liters) of fluid daily. Your urine should be clear and light yellow straw colored. Follow up 3 months with Dr. Gilberto Ramirez  with [address]    Thank you for allowing us the privilege of being a part of your healthcare team! Please do not hesitate to contact our office at [phone] option 2 with any questions or concerns.

## 2023-10-24 NOTE — PROGRESS NOTES
Jimmy Melchor is a 78 y.o. female who presents for routine immunizations. She denies any symptoms , reactions or allergies that would exclude them from being immunized today. Risks and adverse reactions were discussed and the VIS was given to them. All questions were addressed. She was observed for 15 min post injection. There were no reactions observed.     Brianna Lacy

## 2023-11-02 ENCOUNTER — TELEPHONE (OUTPATIENT)
Age: 78
End: 2023-11-02

## 2023-11-03 PROBLEM — N18.30 STAGE 3 CHRONIC KIDNEY DISEASE, UNSPECIFIED WHETHER STAGE 3A OR 3B CKD (HCC): Status: ACTIVE | Noted: 2023-11-03

## 2023-11-16 ENCOUNTER — TELEPHONE (OUTPATIENT)
Age: 78
End: 2023-11-16

## 2023-11-16 NOTE — TELEPHONE ENCOUNTER
Left second voicemail asking pt to call me back to schedule 3 month follow-up in [place] with Dr. Negra Lyons.

## 2023-12-12 ENCOUNTER — TRANSCRIBE ORDERS (OUTPATIENT)
Facility: HOSPITAL | Age: 78
End: 2023-12-12

## 2023-12-12 DIAGNOSIS — Z12.31 SCREENING MAMMOGRAM FOR HIGH-RISK PATIENT: Primary | ICD-10-CM

## 2023-12-26 ENCOUNTER — HOSPITAL ENCOUNTER (OUTPATIENT)
Facility: HOSPITAL | Age: 78
Discharge: HOME | End: 2023-12-29
Attending: FAMILY MEDICINE
Payer: MEDICARE

## 2023-12-26 VITALS — HEIGHT: 64 IN | BODY MASS INDEX: 28.17 KG/M2 | WEIGHT: 165 LBS

## 2023-12-26 DIAGNOSIS — Z12.31 SCREENING MAMMOGRAM FOR HIGH-RISK PATIENT: ICD-10-CM

## 2023-12-26 PROCEDURE — 77063 BREAST TOMOSYNTHESIS BI: CPT

## 2023-12-27 ENCOUNTER — OFFICE VISIT (OUTPATIENT)
Age: 78
End: 2023-12-27
Payer: MEDICARE

## 2023-12-27 VITALS
DIASTOLIC BLOOD PRESSURE: 70 MMHG | RESPIRATION RATE: 16 BRPM | HEIGHT: 64 IN | WEIGHT: 155 LBS | OXYGEN SATURATION: 99 % | HEART RATE: 83 BPM | BODY MASS INDEX: 26.46 KG/M2 | SYSTOLIC BLOOD PRESSURE: 120 MMHG

## 2023-12-27 DIAGNOSIS — I05.9 MITRAL VALVE DISORDER: ICD-10-CM

## 2023-12-27 DIAGNOSIS — I48.20 CHRONIC ATRIAL FIBRILLATION (HCC): ICD-10-CM

## 2023-12-27 DIAGNOSIS — I42.2 HYPERTROPHIC CARDIOMYOPATHY (HCC): Primary | ICD-10-CM

## 2023-12-27 DIAGNOSIS — I50.32 CHRONIC DIASTOLIC HEART FAILURE (HCC): ICD-10-CM

## 2023-12-27 DIAGNOSIS — E78.00 PURE HYPERCHOLESTEROLEMIA: ICD-10-CM

## 2023-12-27 DIAGNOSIS — I10 HYPERTENSION, ESSENTIAL: ICD-10-CM

## 2023-12-27 PROCEDURE — 3074F SYST BP LT 130 MM HG: CPT | Performed by: SPECIALIST

## 2023-12-27 PROCEDURE — G8427 DOCREV CUR MEDS BY ELIG CLIN: HCPCS | Performed by: SPECIALIST

## 2023-12-27 PROCEDURE — 1036F TOBACCO NON-USER: CPT | Performed by: SPECIALIST

## 2023-12-27 PROCEDURE — 99214 OFFICE O/P EST MOD 30 MIN: CPT | Performed by: SPECIALIST

## 2023-12-27 PROCEDURE — 3078F DIAST BP <80 MM HG: CPT | Performed by: SPECIALIST

## 2023-12-27 PROCEDURE — G8484 FLU IMMUNIZE NO ADMIN: HCPCS | Performed by: SPECIALIST

## 2023-12-27 PROCEDURE — G8399 PT W/DXA RESULTS DOCUMENT: HCPCS | Performed by: SPECIALIST

## 2023-12-27 PROCEDURE — G8419 CALC BMI OUT NRM PARAM NOF/U: HCPCS | Performed by: SPECIALIST

## 2023-12-27 PROCEDURE — 1090F PRES/ABSN URINE INCON ASSESS: CPT | Performed by: SPECIALIST

## 2023-12-27 PROCEDURE — 1123F ACP DISCUSS/DSCN MKR DOCD: CPT | Performed by: SPECIALIST

## 2023-12-27 NOTE — PROGRESS NOTES
to 3 tablets MAX daily, Disp: , Rfl:     ELIQUIS 5 MG TABS tablet, TAKE 1 TABLET BY MOUTH EVERY 12 HOURS, Disp: 60 tablet, Rfl: 3    albuterol sulfate HFA (PROVENTIL;VENTOLIN;PROAIR) 108 (90 Base) MCG/ACT inhaler, Inhale 1 puff into the lungs every 6 hours as needed, Disp: , Rfl:     fexofenadine (ALLEGRA) 180 MG tablet, Take 1 tablet by mouth daily as needed, Disp: , Rfl:     fluticasone-salmeterol (ADVAIR) 250-50 MCG/ACT AEPB diskus inhaler, TAKE 1 PUFF BY MOUTH TWICE A DAY, Disp: , Rfl:     furosemide (LASIX) 20 MG tablet, Take 1 tablet by mouth daily as needed, Disp: , Rfl:     triamcinolone (KENALOG) 0.1 % cream, APPLY A THIN LAYER TO AFFECTED AREA(S) AS DIRECTED TWICE A DAY AS NEEDED, Disp: , Rfl:     vitamin D (CHOLECALCIFEROL) 25 MCG (1000 UT) TABS tablet, Take 1 tablet by mouth daily, Disp: , Rfl:    Impression see above.

## 2024-01-03 ENCOUNTER — TRANSCRIBE ORDERS (OUTPATIENT)
Facility: HOSPITAL | Age: 79
End: 2024-01-03

## 2024-01-03 DIAGNOSIS — Z12.31 VISIT FOR SCREENING MAMMOGRAM: Primary | ICD-10-CM

## 2024-01-04 ENCOUNTER — TELEPHONE (OUTPATIENT)
Age: 79
End: 2024-01-04

## 2024-01-04 NOTE — TELEPHONE ENCOUNTER
Verified patient with two types of identifiers. Spoke to Ms. Dre about cost of Eliquis. Advised she come to office today and  application for PAF with [facility]. She was agreeable.

## 2024-01-18 ENCOUNTER — OFFICE VISIT (OUTPATIENT)
Age: 79
End: 2024-01-18
Payer: MEDICARE

## 2024-01-18 VITALS
OXYGEN SATURATION: 95 % | TEMPERATURE: 98.1 F | SYSTOLIC BLOOD PRESSURE: 132 MMHG | WEIGHT: 161 LBS | HEIGHT: 64 IN | DIASTOLIC BLOOD PRESSURE: 80 MMHG | HEART RATE: 84 BPM | BODY MASS INDEX: 27.49 KG/M2

## 2024-01-18 DIAGNOSIS — Z86.79 H/O HEART FAILURE: ICD-10-CM

## 2024-01-18 DIAGNOSIS — I48.20 CHRONIC ATRIAL FIBRILLATION (HCC): ICD-10-CM

## 2024-01-18 DIAGNOSIS — G47.33 OSA (OBSTRUCTIVE SLEEP APNEA): Primary | ICD-10-CM

## 2024-01-18 PROCEDURE — G8484 FLU IMMUNIZE NO ADMIN: HCPCS | Performed by: INTERNAL MEDICINE

## 2024-01-18 PROCEDURE — 3079F DIAST BP 80-89 MM HG: CPT | Performed by: INTERNAL MEDICINE

## 2024-01-18 PROCEDURE — 99202 OFFICE O/P NEW SF 15 MIN: CPT | Performed by: INTERNAL MEDICINE

## 2024-01-18 PROCEDURE — 1036F TOBACCO NON-USER: CPT | Performed by: INTERNAL MEDICINE

## 2024-01-18 PROCEDURE — G8427 DOCREV CUR MEDS BY ELIG CLIN: HCPCS | Performed by: INTERNAL MEDICINE

## 2024-01-18 PROCEDURE — G8419 CALC BMI OUT NRM PARAM NOF/U: HCPCS | Performed by: INTERNAL MEDICINE

## 2024-01-18 PROCEDURE — 1090F PRES/ABSN URINE INCON ASSESS: CPT | Performed by: INTERNAL MEDICINE

## 2024-01-18 PROCEDURE — 1123F ACP DISCUSS/DSCN MKR DOCD: CPT | Performed by: INTERNAL MEDICINE

## 2024-01-18 PROCEDURE — 3075F SYST BP GE 130 - 139MM HG: CPT | Performed by: INTERNAL MEDICINE

## 2024-01-18 PROCEDURE — G8399 PT W/DXA RESULTS DOCUMENT: HCPCS | Performed by: INTERNAL MEDICINE

## 2024-01-18 ASSESSMENT — SLEEP AND FATIGUE QUESTIONNAIRES
HOW LIKELY ARE YOU TO NOD OFF OR FALL ASLEEP WHEN YOU ARE A PASSENGER IN A CAR FOR AN HOUR WITHOUT A BREAK: 0
ESS TOTAL SCORE: 6
HOW LIKELY ARE YOU TO NOD OFF OR FALL ASLEEP WHILE SITTING INACTIVE IN A PUBLIC PLACE: 0
HOW LIKELY ARE YOU TO NOD OFF OR FALL ASLEEP IN A CAR, WHILE STOPPED FOR A FEW MINUTES IN TRAFFIC: 0
NECK CIRCUMFERENCE (INCHES): 15.25
HOW LIKELY ARE YOU TO NOD OFF OR FALL ASLEEP WHILE SITTING AND TALKING TO SOMEONE: 0
HOW LIKELY ARE YOU TO NOD OFF OR FALL ASLEEP WHILE SITTING AND READING: 1
HOW LIKELY ARE YOU TO NOD OFF OR FALL ASLEEP WHILE LYING DOWN TO REST IN THE AFTERNOON WHEN CIRCUMSTANCES PERMIT: 3
HOW LIKELY ARE YOU TO NOD OFF OR FALL ASLEEP WHILE WATCHING TV: 1
HOW LIKELY ARE YOU TO NOD OFF OR FALL ASLEEP WHILE SITTING QUIETLY AFTER LUNCH WITHOUT ALCOHOL: 1

## 2024-01-18 NOTE — PROGRESS NOTES
DL RESMED S9   CPAP 6 cmH2O  EPR 2  Ramp 45 min from 4 cmH2O  Leak 3.6 l/m  AHI 0.2     is not working.

## 2024-01-18 NOTE — PROGRESS NOTES
[address] [place], [place] [address]  Tel.  [phone]    Fax. [phone]     [address] [place], [place] [address]  Tel.  [phone]    Fax. [phone] [address] [place] [address]  Tel.  [phone]    Fax. [phone]       Joana Erickson is a 78 y.o. year old female seen for evaluation of a sleep disorder.       ASSESSMENT/PLAN:     Diagnosis Orders   1. HAYDEN (obstructive sleep apnea)  SLEEP STUDY FULL      2. Chronic atrial fibrillation (HCC)        3. H/O heart failure        4. BMI 27.0-27.9,adult            Patient has a history and examination consistent with the diagnosis of sleep apnea.    No follow-ups on file.    * The patient currently has a Low Risk for having sleep apnea.  STOP-BANG score 3.    * Sleep testing was ordered for evaluation due to interim weight loss of 10%..      Orders Placed This Encounter   Procedures    SLEEP STUDY FULL     Standing Status:   Future     Standing Expiration Date:   1/18/2025     Scheduling Instructions:      Adult PSG in supine position       * She was provided information on sleep apnea including corresponding risk factors and the importance of proper treatment.     * Treatment options were reviewed in detail. she would like to proceed with PAP therapy. Patient will be seen in follow-up in 6-8 weeks after PAP setup to gauge treatment response and adherence to therapy.     * The patient was counseled regarding proper sleep hygiene, with emphasis on ensuring sufficient total sleep time; safe driving and the benefits of exercise and weight loss.      * All of her questions were addressed.        SUBJECTIVE/OBJECTIVE:    Joana Erickson is an 78 y.o. female referred for evaluation for a sleep disorder. She was previously diagnosed with HAYDEN and has been on PAP therapy since that time. Her device is old and she thinks it is not working properly.

## 2024-01-18 NOTE — PATIENT INSTRUCTIONS
[address] [place]  [place] [address]  Tel.  [phone]  Fax. [phone] [address] [place]  [place] [address]  Tel.  [phone]  Fax. [phone] [address] [place] [address]  Tel.  [phone]  Fax. [phone]     Sleep Apnea: After Your Visit  Your Care Instructions  Sleep apnea occurs when you frequently stop breathing for 10 seconds or longer during sleep. It can be mild to severe, based on the number of times per hour that you stop breathing or have slowed breathing. Blocked or narrowed airways in your nose, mouth, or throat can cause sleep apnea. Your airway can become blocked when your throat muscles and tongue relax during sleep.  Sleep apnea is common, occurring in 1 out of 20 individuals.  Individuals having any of the following characteristics should be evaluated and treated right away due to high risk and detrimental consequences from untreated sleep apnea:  Obesity  Congestive Heart failure  Atrial Fibrillation  Uncontrolled Hypertension  Type II Diabetes  Night-time Arrhythmias  Stroke  Pulmonary Hypertension  High-risk Driving Populations (pilots, truck drivers, etc.)  Patients Considering Weight-loss Surgery    How do you know you have sleep apnea?  You probably have sleep apnea if you answer 'yes' to 3 or more of the following questions:  S - Have you been told that you Snore?   T - Are you often Tired during the day?  O - Has anyone Observed you stop breathing while sleeping?  P- Do you have (or are being treated for) high blood Pressure?    B - Are you obese (Body Mass Index > 35)?  A - Is your Age 50 years old or older?  N - Is your Neck size greater than 16 inches?  G - Are you male Gender?  A sleep physician can prescribe a breathing device that prevents tissues in the throat from blocking your airway. Or your doctor may recommend using a dental device (oral breathing device) to help keep your airway open. In some cases, surgery may

## 2024-01-30 ENCOUNTER — HOSPITAL ENCOUNTER (OUTPATIENT)
Facility: HOSPITAL | Age: 79
Discharge: HOME | End: 2024-02-02
Payer: MEDICARE

## 2024-01-30 DIAGNOSIS — G47.33 OSA (OBSTRUCTIVE SLEEP APNEA): ICD-10-CM

## 2024-01-30 PROCEDURE — 95810 POLYSOM 6/> YRS 4/> PARAM: CPT | Performed by: INTERNAL MEDICINE

## 2024-01-31 ENCOUNTER — TELEPHONE (OUTPATIENT)
Age: 79
End: 2024-01-31

## 2024-01-31 VITALS
HEART RATE: 71 BPM | TEMPERATURE: 97.6 F | WEIGHT: 161 LBS | DIASTOLIC BLOOD PRESSURE: 87 MMHG | SYSTOLIC BLOOD PRESSURE: 137 MMHG | HEIGHT: 64 IN | BODY MASS INDEX: 27.49 KG/M2 | OXYGEN SATURATION: 92 %

## 2024-01-31 DIAGNOSIS — G47.33 OSA (OBSTRUCTIVE SLEEP APNEA): Primary | ICD-10-CM

## 2024-01-31 NOTE — TELEPHONE ENCOUNTER
Reviewed sleep study results.  Patient will to come in for WatchPAT study.    Please schedule watchPAT study.    Thanks

## 2024-01-31 NOTE — PROGRESS NOTES
Sleep Study Technical Notes   Disclaimer:  all notes have not been confirmed by interpreting physician      PRE-Test:  Joana Erickson (: 1945) arrived in the lobby. The patient was taken to the Sleep Center and taken directly to his/her room.   Procedure explained to the patient and questions were answered. The patient expressed understanding of the procedure. Electrodes were applied without incident. The patient was placed in bed and the study was started.    Acquisition Notes:  Lights off: 10:04 PM    Respiratory events:   A__N    H__Y  C__N  M__N  ECG:    Significant arrhythmias:   NSR  Snoring:  None   Sleep Stages:  REM   N  Patient slept with head of bed elevated:  Y  Supine sleep assessed per physician order:  Y  Other comments: Patient had a difficult time being ambulatory and going to restroom on her own, would recommend caregiver if patient needs to come back.   Patient had caregiver in attendance:  N, but patient would benefit from one if she has to come back.   Patient to bathroom 2 times      POST Test:  Patient was awakened.  Electrodes were removed.    The patient was discharged after answering the Post Questionnaire.    Equipment and room cleaned per infection control policy.

## 2024-01-31 NOTE — TELEPHONE ENCOUNTER
Failed attended test due to poor sleep efficiency. Recommended Watch PAT testing.      Encounter Diagnosis   Name Primary?    HAYDEN (obstructive sleep apnea) Yes         Orders Placed This Encounter   Procedures    SLEEP STUDY UNATTENDED, 4 CHANNEL     Standing Status:   Future     Standing Expiration Date:   1/31/2025

## 2024-02-07 ENCOUNTER — HOSPITAL ENCOUNTER (OUTPATIENT)
Facility: HOSPITAL | Age: 79
Discharge: HOME | End: 2024-02-10
Payer: MEDICARE

## 2024-02-07 ENCOUNTER — PROCEDURE VISIT (OUTPATIENT)
Age: 79
End: 2024-02-07

## 2024-02-07 DIAGNOSIS — G47.33 OSA (OBSTRUCTIVE SLEEP APNEA): Primary | ICD-10-CM

## 2024-02-07 DIAGNOSIS — G47.33 OSA (OBSTRUCTIVE SLEEP APNEA): ICD-10-CM

## 2024-02-07 PROCEDURE — 95800 SLP STDY UNATTENDED: CPT | Performed by: INTERNAL MEDICINE

## 2024-02-07 NOTE — PROGRESS NOTES
S>Joana Erickson is a 78 y.o. female seen today to receive a home sleep testing unit (WatchPAT).    Patient was educated on proper hookup and operation of the WatchPAT HST via detailed instruction sheet .  Instruction forms with after hours contact and documentation were signed.    O>    There were no vitals taken for this visit.      A>  No diagnosis found.      P>  General information regarding operations and maintenance of the device was provided.  Follow-up appointment was made to return the WatchPAT HST. She will be contacted once the results have been reviewed.  She was asked to contact our office for any problems regarding her home sleep test study.

## 2024-02-13 ENCOUNTER — TELEPHONE (OUTPATIENT)
Age: 79
End: 2024-02-13

## 2024-02-13 DIAGNOSIS — G47.33 OSA (OBSTRUCTIVE SLEEP APNEA): Primary | ICD-10-CM

## 2024-02-13 NOTE — TELEPHONE ENCOUNTER
Pt will be seen on 2/14 at [facility] to have her teeth looked at. Pt is in need of medication clearance for eliquis 5mg.     [facility] # [phone]

## 2024-02-19 ENCOUNTER — TELEPHONE (OUTPATIENT)
Age: 79
End: 2024-02-19

## 2024-02-20 ENCOUNTER — TELEPHONE (OUTPATIENT)
Age: 79
End: 2024-02-20

## 2024-02-27 NOTE — TELEPHONE ENCOUNTER
Robert with [facility] is calling to speak with Arnaldo in reference to medical clearance. She would like a callback.     Robert Ph# [phone]

## 2024-02-27 NOTE — TELEPHONE ENCOUNTER
Spoke to Robert with [facility]. Ms. Erickson needs cardiac and medication clearance for Eliquis prior to dental procedure under local with Dr. Mayorga.     Fax: [phone]   No

## 2024-02-27 NOTE — TELEPHONE ENCOUNTER
Joana Erickson is to be contacted by sleep technologists regarding results of WatchPAT Testing which was indicative of an average pAHI 3% of 19.5 and pAHI 4% of 7.5 per hour.  SpO2 sarita was 86% and SpO2 of < 88% was 2.2 minutes.        Encounter Diagnosis   Name Primary?    HAYDEN (obstructive sleep apnea) Yes       Orders Placed This Encounter   Procedures    SLEEP LAB (PAP TITRATION)     Standing Status:   Future     Standing Expiration Date:   2/26/2025

## 2024-02-28 NOTE — TELEPHONE ENCOUNTER
Attempted to contact patient to discuss PAP device order and DME company options. Patient was previously with [facility] in 2017. LVM requesting that patient call [facility] to discuss PAP device order.

## 2024-02-28 NOTE — TELEPHONE ENCOUNTER
Called and spoke to patient to discuss PAP device order. As per patients [employer] insurance, PAP device order needed to be sent to [employer]. Patient stated that as she does not have a means of transportation and as she is unable to do virtual calls, she will need to be set-up at home. I informed the patient that I will speak to Wilbur Rg our local representative to see what options are available. Upon investigation of the patients chart, a order has been placed for a PAP titration sleep study and no order has been placed for a PAP device. Forwarding to Dr. Ramos for clarification to please advise on next steps.

## 2024-02-28 NOTE — TELEPHONE ENCOUNTER
Reviewed sleep study results with patient. She expressed understanding and is willing to proceed with a trial of APAP.    Please fax DME order & Schedule 1st adherence visit in 31 to 90 days.

## 2024-02-29 NOTE — TELEPHONE ENCOUNTER
Galindo Rojas MD  You2 days ago       I have no objection to the planned  surgery. Patient seems to be at a low risk for leida-operative severe adverse cardiac events.  Hold Eliquis 48 hours, before, resume 24 hours later     Clearance faxed. Confirmation received.

## 2024-03-13 PROBLEM — I50.22 CHRONIC SYSTOLIC (CONGESTIVE) HEART FAILURE (HCC): Status: ACTIVE | Noted: 2024-03-13

## 2024-03-13 PROBLEM — E11.59 HYPERTENSION COMPLICATING DIABETES (HCC): Status: ACTIVE | Noted: 2022-10-20

## 2024-03-13 PROBLEM — E11.9 HYPERTENSION COMPLICATING DIABETES (HCC): Status: ACTIVE | Noted: 2022-10-20

## 2024-03-13 PROBLEM — I15.2 HYPERTENSION COMPLICATING DIABETES (HCC): Status: ACTIVE | Noted: 2022-10-20

## 2024-03-22 ENCOUNTER — TELEPHONE (OUTPATIENT)
Age: 79
End: 2024-03-22

## 2024-03-22 NOTE — PROGRESS NOTES
tablet, Take 1 tablet by mouth daily as needed, Disp: , Rfl:     triamcinolone (KENALOG) 0.1 % cream, APPLY A THIN LAYER TO AFFECTED AREA(S) AS DIRECTED TWICE A DAY AS NEEDED, Disp: , Rfl:     Thank you for your referral and allowing me to participate in this patient's care.    Ángel Leung MD, [facility]  Advanced Heart Failure and Pulmonary Hypertension    Advanced Heart Failure Center  [facility]  [address], [address]  Phone: [phone]  Fax: [phone]    PATIENT CARE TEAM:  Patient Care Team:  Melonie Mendoza MD as PCP - General

## 2024-03-28 ENCOUNTER — OFFICE VISIT (OUTPATIENT)
Age: 79
End: 2024-03-28
Payer: MEDICARE

## 2024-03-28 VITALS
TEMPERATURE: 97.6 F | HEART RATE: 72 BPM | OXYGEN SATURATION: 97 % | WEIGHT: 160.2 LBS | RESPIRATION RATE: 18 BRPM | BODY MASS INDEX: 27.5 KG/M2 | DIASTOLIC BLOOD PRESSURE: 66 MMHG | SYSTOLIC BLOOD PRESSURE: 136 MMHG

## 2024-03-28 DIAGNOSIS — Z51.81 ENCOUNTER FOR MONITORING DIURETIC THERAPY: ICD-10-CM

## 2024-03-28 DIAGNOSIS — I50.32 CHRONIC DIASTOLIC HEART FAILURE (HCC): Primary | ICD-10-CM

## 2024-03-28 DIAGNOSIS — Z79.899 ENCOUNTER FOR MONITORING DIURETIC THERAPY: ICD-10-CM

## 2024-03-28 DIAGNOSIS — I42.2 HYPERTROPHIC CARDIOMYOPATHY (HCC): ICD-10-CM

## 2024-03-28 DIAGNOSIS — G47.33 OSA (OBSTRUCTIVE SLEEP APNEA): ICD-10-CM

## 2024-03-28 DIAGNOSIS — I48.0 PAROXYSMAL ATRIAL FIBRILLATION (HCC): ICD-10-CM

## 2024-03-28 DIAGNOSIS — I50.32 CHRONIC DIASTOLIC HEART FAILURE (HCC): ICD-10-CM

## 2024-03-28 DIAGNOSIS — I10 ESSENTIAL HYPERTENSION: ICD-10-CM

## 2024-03-28 PROBLEM — I50.22 CHRONIC SYSTOLIC (CONGESTIVE) HEART FAILURE (HCC): Status: RESOLVED | Noted: 2024-03-13 | Resolved: 2024-03-28

## 2024-03-28 PROCEDURE — G8399 PT W/DXA RESULTS DOCUMENT: HCPCS | Performed by: INTERNAL MEDICINE

## 2024-03-28 PROCEDURE — 1090F PRES/ABSN URINE INCON ASSESS: CPT | Performed by: INTERNAL MEDICINE

## 2024-03-28 PROCEDURE — G8419 CALC BMI OUT NRM PARAM NOF/U: HCPCS | Performed by: INTERNAL MEDICINE

## 2024-03-28 PROCEDURE — G8427 DOCREV CUR MEDS BY ELIG CLIN: HCPCS | Performed by: INTERNAL MEDICINE

## 2024-03-28 PROCEDURE — G8484 FLU IMMUNIZE NO ADMIN: HCPCS | Performed by: INTERNAL MEDICINE

## 2024-03-28 PROCEDURE — 3075F SYST BP GE 130 - 139MM HG: CPT | Performed by: INTERNAL MEDICINE

## 2024-03-28 PROCEDURE — 1123F ACP DISCUSS/DSCN MKR DOCD: CPT | Performed by: INTERNAL MEDICINE

## 2024-03-28 PROCEDURE — 99214 OFFICE O/P EST MOD 30 MIN: CPT | Performed by: INTERNAL MEDICINE

## 2024-03-28 PROCEDURE — 1036F TOBACCO NON-USER: CPT | Performed by: INTERNAL MEDICINE

## 2024-03-28 PROCEDURE — 3078F DIAST BP <80 MM HG: CPT | Performed by: INTERNAL MEDICINE

## 2024-03-29 ENCOUNTER — TELEPHONE (OUTPATIENT)
Age: 79
End: 2024-03-29

## 2024-03-29 DIAGNOSIS — Z79.899 ENCOUNTER FOR MONITORING DIURETIC THERAPY: Primary | ICD-10-CM

## 2024-03-29 DIAGNOSIS — Z51.81 ENCOUNTER FOR MONITORING DIURETIC THERAPY: Primary | ICD-10-CM

## 2024-03-29 DIAGNOSIS — E87.6 HYPOKALEMIA: ICD-10-CM

## 2024-03-29 LAB
ANION GAP SERPL CALC-SCNC: 5 MMOL/L (ref 5–15)
BUN SERPL-MCNC: 30 MG/DL (ref 6–20)
BUN/CREAT SERPL: 32 (ref 12–20)
CALCIUM SERPL-MCNC: 9.2 MG/DL (ref 8.5–10.1)
CHLORIDE SERPL-SCNC: 105 MMOL/L (ref 97–108)
CO2 SERPL-SCNC: 36 MMOL/L (ref 21–32)
CREAT SERPL-MCNC: 0.93 MG/DL (ref 0.55–1.02)
GLUCOSE SERPL-MCNC: 110 MG/DL (ref 65–100)
NT PRO BNP: 3450 PG/ML
POTASSIUM SERPL-SCNC: 3.2 MMOL/L (ref 3.5–5.1)
SODIUM SERPL-SCNC: 146 MMOL/L (ref 136–145)

## 2024-03-29 RX ORDER — POTASSIUM CHLORIDE 750 MG/1
10 TABLET, EXTENDED RELEASE ORAL DAILY
Qty: 40 TABLET | Refills: 1 | Status: SHIPPED | OUTPATIENT
Start: 2024-03-29

## 2024-03-29 NOTE — TELEPHONE ENCOUNTER
----- Message from Uzair Leung MD sent at 3/29/2024  3:33 PM EDT -----  Patient's potassium is low and tends to run on the low side. She likely loses potassium with taking HCTZ and whenever she takes Lasix. Please have her start potassium chloride 10 meq daily. Have her take an extra 10 meq any time she takes Lasix (total of 20 meq on Lasix days). Have her though for the next 2 days to take 20 meq to replace the low potassium right now. Lets repeat a BMP in 2 weeks    Called patient, LM   Sent rx to [facility] with instructions as above.  4/1- Called again, no answer. LM for return call  Requested Prescriptions     Signed Prescriptions Disp Refills    potassium chloride (KLOR-CON M) 10 MEQ extended release tablet 40 tablet 1     Sig: Take 1 tablet by mouth daily Take additional 10 meq daily when taking as needed lasix     Authorizing Provider: UZAIR LEUNG     Ordering User: MITZI CERDA     4/2- Patient returned call to nurse, reviewed above, patient able to teach back potassium instructions. Patient will go to lab brianna at [facility] in [place] (when she is able)

## 2024-04-10 ENCOUNTER — HOSPITAL ENCOUNTER (OUTPATIENT)
Facility: HOSPITAL | Age: 79
Discharge: HOME | End: 2024-04-13
Payer: MEDICARE

## 2024-04-10 VITALS
OXYGEN SATURATION: 98 % | WEIGHT: 162 LBS | DIASTOLIC BLOOD PRESSURE: 80 MMHG | SYSTOLIC BLOOD PRESSURE: 119 MMHG | HEIGHT: 64 IN | BODY MASS INDEX: 27.66 KG/M2 | TEMPERATURE: 97.2 F | HEART RATE: 78 BPM

## 2024-04-10 DIAGNOSIS — G47.33 OSA (OBSTRUCTIVE SLEEP APNEA): ICD-10-CM

## 2024-04-10 PROCEDURE — 95811 POLYSOM 6/>YRS CPAP 4/> PARM: CPT | Performed by: INTERNAL MEDICINE

## 2024-04-11 NOTE — PROGRESS NOTES
Sleep Study Technical Notes   Disclaimer:  all notes have not been confirmed by interpreting physician      PRE-Test:  Joana Erickson (: 1945) is her for a Titration study.  Order and chart reviewed by tech.  Patient is an 78 year old female with history of known HAYDEN, current CPAP user, restless sleep, feet twitching, and vivid dreams.  She was originally diagnosed with Mild HAYDEN after a study done 2011 and the Overall AHI was 5.0.  She had a WatchPAT study done 2024 and the diagnosis is again Mild HAYDEN with an Overall AHI of 7.5.  The patient was taken directly to his/her room.   Procedure explained to the patient and questions were answered. The patient expressed understanding of the procedure. Electrodes were applied without incident. The patient was placed in bed and the study was started.    Acquisition Notes:  Lights off: 9:48 PM    Respiratory events:  POSSIBLE RERAS, HYPOPNEAS, AND CENTRAL APNEAS NOTED BY TECH  ECG:  IRREGULAR RHYTHM NOTED   Snoring:  MILD/MODERATE   Sleep Stages:  1, 2, AND REM  Sleep Positions:  SUPINE   Titration type:  CPAP   PAP titration information in study flow sheet if applicable  Patient slept with positional therapy:  NO  Patient slept with head of bed elevated:  NO  Supine sleep assessed per physician order:  NO   Patient wore an oral appliance:  NO  Other comments:   OVERALL AHI AT THE FINAL PRESSURE = 1.7                                      AVERAGE SA02 IS 95%                                       LMI = 90.8  Patient had caregiver in attendance:  NO  Patient watched TV or on phone after lights out:  NO  Patient to bathroom 1 times  Pediatric Patient:  NA   Parent accompanied patient: NA  Parent slept in bed with patient: NA    POST Test:  Patient was awakened at 6:00 am.  Electrodes were removed.    The patient was discharged after answering the Post Questionnaire.    Equipment and room cleaned per infection control policy.

## 2024-04-12 ENCOUNTER — CLINICAL DOCUMENTATION (OUTPATIENT)
Age: 79
End: 2024-04-12

## 2024-04-12 DIAGNOSIS — G47.33 OSA (OBSTRUCTIVE SLEEP APNEA): Primary | ICD-10-CM

## 2024-04-12 NOTE — PROGRESS NOTES
Polysomnography with PAP titration interpreted, device ordered.    Encounter Diagnosis   Name Primary?    HAYDEN (obstructive sleep apnea) Yes         Orders Placed This Encounter   Procedures    DME Order for (Specify) as OP     - DME device ordered - ResMed Device with Heated Humidifer  / .   - Diagnosis: Obstructive Sleep Apnea (G47.33)  - Length of Need: Lifetime    Pressure Settin - 12 cmH2O     Nasal Cushion (Replace) 2 per month.     Nasal Interface Mask 1 every 3 months.    Headgear 1 every 6 months.     Filter(s) Disposable 2 per month.   Filter(s) Non-Disposable 1 every 6 months.      Water Chamber for Humidifier (Replace) 1 every 6 months.   Tubing with heating element 1 every 3 months.    Perform Mask Fitting per patient preference and comfort - replace as above.      Kelsey Ramos MD, FAASM; NPI: [id number]  Electronically signed. 2024

## 2024-04-16 DIAGNOSIS — Z51.81 ENCOUNTER FOR MONITORING DIURETIC THERAPY: ICD-10-CM

## 2024-04-16 DIAGNOSIS — Z79.899 ENCOUNTER FOR MONITORING DIURETIC THERAPY: ICD-10-CM

## 2024-04-16 DIAGNOSIS — E87.6 HYPOKALEMIA: ICD-10-CM

## 2024-04-22 ENCOUNTER — CLINICAL DOCUMENTATION (OUTPATIENT)
Age: 79
End: 2024-04-22

## 2024-06-11 RX ORDER — POTASSIUM CHLORIDE 750 MG/1
TABLET, EXTENDED RELEASE ORAL
Qty: 40 TABLET | Refills: 2 | Status: SHIPPED | OUTPATIENT
Start: 2024-06-11

## 2024-06-11 NOTE — TELEPHONE ENCOUNTER
Requested Prescriptions     Pending Prescriptions Disp Refills    KLOR-CON M10 10 MEQ extended release tablet [Pharmacy Med Name: KLOR-CON M10 TABLET] 40 tablet 2     Sig: TAKE 1 TABLET BY MOUTH DAILY TAKE ADDITIONAL 10 MEQ DAILY WHEN TAKING AS NEEDED LASIX

## 2024-06-14 ENCOUNTER — OFFICE VISIT (OUTPATIENT)
Age: 79
End: 2024-06-14
Payer: MEDICARE

## 2024-06-14 VITALS
OXYGEN SATURATION: 94 % | WEIGHT: 148 LBS | HEIGHT: 64 IN | SYSTOLIC BLOOD PRESSURE: 122 MMHG | DIASTOLIC BLOOD PRESSURE: 76 MMHG | HEART RATE: 78 BPM | BODY MASS INDEX: 25.27 KG/M2

## 2024-06-14 DIAGNOSIS — I48.20 CHRONIC ATRIAL FIBRILLATION (HCC): ICD-10-CM

## 2024-06-14 DIAGNOSIS — I05.9 MITRAL VALVE DISORDER: ICD-10-CM

## 2024-06-14 DIAGNOSIS — E78.00 HYPERCHOLESTEREMIA: ICD-10-CM

## 2024-06-14 DIAGNOSIS — I42.1 HYPERTROPHIC OBSTRUCTIVE CARDIOMYOPATHY (HCC): ICD-10-CM

## 2024-06-14 DIAGNOSIS — I50.32 CHRONIC DIASTOLIC HEART FAILURE (HCC): ICD-10-CM

## 2024-06-14 DIAGNOSIS — I10 ESSENTIAL HYPERTENSION: ICD-10-CM

## 2024-06-14 DIAGNOSIS — I42.2 HYPERTROPHIC CARDIOMYOPATHY (HCC): Primary | ICD-10-CM

## 2024-06-14 PROCEDURE — 99214 OFFICE O/P EST MOD 30 MIN: CPT | Performed by: SPECIALIST

## 2024-06-14 PROCEDURE — 3078F DIAST BP <80 MM HG: CPT | Performed by: SPECIALIST

## 2024-06-14 PROCEDURE — 1123F ACP DISCUSS/DSCN MKR DOCD: CPT | Performed by: SPECIALIST

## 2024-06-14 PROCEDURE — 1036F TOBACCO NON-USER: CPT | Performed by: SPECIALIST

## 2024-06-14 PROCEDURE — G8399 PT W/DXA RESULTS DOCUMENT: HCPCS | Performed by: SPECIALIST

## 2024-06-14 PROCEDURE — 1090F PRES/ABSN URINE INCON ASSESS: CPT | Performed by: SPECIALIST

## 2024-06-14 PROCEDURE — 93010 ELECTROCARDIOGRAM REPORT: CPT | Performed by: SPECIALIST

## 2024-06-14 PROCEDURE — 93005 ELECTROCARDIOGRAM TRACING: CPT | Performed by: SPECIALIST

## 2024-06-14 PROCEDURE — G8427 DOCREV CUR MEDS BY ELIG CLIN: HCPCS | Performed by: SPECIALIST

## 2024-06-14 PROCEDURE — G8419 CALC BMI OUT NRM PARAM NOF/U: HCPCS | Performed by: SPECIALIST

## 2024-06-14 PROCEDURE — 3074F SYST BP LT 130 MM HG: CPT | Performed by: SPECIALIST

## 2024-06-14 ASSESSMENT — PATIENT HEALTH QUESTIONNAIRE - PHQ9
SUM OF ALL RESPONSES TO PHQ9 QUESTIONS 1 & 2: 0
2. FEELING DOWN, DEPRESSED OR HOPELESS: NOT AT ALL
SUM OF ALL RESPONSES TO PHQ QUESTIONS 1-9: 0
1. LITTLE INTEREST OR PLEASURE IN DOING THINGS: NOT AT ALL
SUM OF ALL RESPONSES TO PHQ QUESTIONS 1-9: 0

## 2024-06-14 NOTE — PROGRESS NOTES
Joana Erickson     1945       Galindo Rojas MD, [facility]  Date of Visit-6/14/2024   PCP is Masters, Melonie CASTREJON MD   [facility] [place]  [facility]  HPI:  Joana Erickson is a 79 y.o. female   6-month visit  Hypertrophic cardiomyopathy, chronic atrial fibrillation, chronic diastolic CHF, HTN, mitral and tricuspid insufficiency, hypercholesterolemia, CKD 3, DM2  Cardiac MRI October 2023 showing nonobstructive HOCM     Today   Saw Dr. Leung 3/28/2024-continue atenolol advised to take Lasix and checked lab work BMP and BNP  BNP was 3450 up from 2615  She says since she saw Dr. Leung she takes the Lasix when  she is swelling and that  its about once a week.    Overall she is feeling fairly well.    She has no shortness of breath chest pain dizziness or syncope.    She has moderate lower extremity edema.    She has some knee pain which bothers her occasionally.    Denies chest pain, , syncope or shortness of breath at rest   Has no tachycardia , palpitations or sense of arrythmia       Atrial fibrillation   Hypertrophic cardiomyopathy   Diastolic dysfunction causing SOB    Echo 6/27/2022 EF 55 to 60% apical variant hypertrophic cardiomyopathy which extends mainly from the apex or tip of the heart but then back to the rest of the heart, reduced RV function with TAPSE of 1.4 mild to moderate MR moderate to severe TR PASP is 53 dilated atria  Echo 7/17/2023.  EF 60-65% severe increase in thickness the apex mid left ventricle moderate thickness at the base of the LV at 1.3.  Basal segment spared.  Consistent with hypertrophic cardiomyopathy.  Severe TR.  TAPSE 1.4.  Mild MR.  Severe biatrial enlargement  Cardiac MRI 10/3/2023-hypertrophic cardiomyopathy severe hypertrophy mid segment.  Mid septal wall 25 mm mid lateral wall 28 mm mid systolic cavity obliteration apical hypokinesia EF 60% RVEF 60% severe 3+ TR 1+ MR myocardial scar is 18% dilated low atria.      EKG- atrial

## 2024-09-01 DIAGNOSIS — I48.20 CHRONIC ATRIAL FIBRILLATION, UNSPECIFIED (HCC): ICD-10-CM

## 2024-09-01 DIAGNOSIS — I10 ESSENTIAL (PRIMARY) HYPERTENSION: ICD-10-CM

## 2024-09-03 RX ORDER — POTASSIUM CHLORIDE 750 MG/1
TABLET, EXTENDED RELEASE ORAL
Qty: 60 TABLET | Refills: 0 | Status: SHIPPED | OUTPATIENT
Start: 2024-09-03

## 2024-09-03 RX ORDER — ATENOLOL 50 MG/1
50 TABLET ORAL EVERY MORNING
Qty: 90 TABLET | Refills: 3 | Status: SHIPPED | OUTPATIENT
Start: 2024-09-03

## 2024-09-03 NOTE — TELEPHONE ENCOUNTER
Per VO by MD.    Future Appointments   Date Time Provider Department Center   9/26/2024  8:30 AM Ricki Leung MD [facility]   12/16/2024  1:40 PM Galindo Rojas MD [facility]

## 2024-09-03 NOTE — TELEPHONE ENCOUNTER
Requested Prescriptions     Pending Prescriptions Disp Refills    potassium chloride (KLOR-CON M10) 10 MEQ extended release tablet [Pharmacy Med Name: KLOR-CON M10 TABLET] 60 tablet 0     Sig: TAKE 1 TABLET BY MOUTH DAILY TAKE ADDITIONAL 1 TAB DAILY WHEN TAKING LASIX AS NEEDED      Last appt March 2024  Next appt 9/26/24

## 2024-09-20 ENCOUNTER — TELEPHONE (OUTPATIENT)
Age: 79
End: 2024-09-20

## 2024-09-26 ENCOUNTER — OFFICE VISIT (OUTPATIENT)
Age: 79
End: 2024-09-26
Payer: MEDICARE

## 2024-09-26 VITALS
WEIGHT: 158 LBS | HEART RATE: 70 BPM | TEMPERATURE: 97.6 F | DIASTOLIC BLOOD PRESSURE: 72 MMHG | OXYGEN SATURATION: 94 % | SYSTOLIC BLOOD PRESSURE: 122 MMHG | HEIGHT: 64 IN | BODY MASS INDEX: 26.98 KG/M2 | RESPIRATION RATE: 16 BRPM

## 2024-09-26 DIAGNOSIS — I48.0 PAF (PAROXYSMAL ATRIAL FIBRILLATION) (HCC): ICD-10-CM

## 2024-09-26 DIAGNOSIS — G47.33 OSA (OBSTRUCTIVE SLEEP APNEA): ICD-10-CM

## 2024-09-26 DIAGNOSIS — I50.32 CHRONIC DIASTOLIC HEART FAILURE (HCC): Primary | ICD-10-CM

## 2024-09-26 DIAGNOSIS — I10 ESSENTIAL HYPERTENSION: ICD-10-CM

## 2024-09-26 DIAGNOSIS — I42.2 HYPERTROPHIC CARDIOMYOPATHY (HCC): ICD-10-CM

## 2024-09-26 DIAGNOSIS — I50.32 CHRONIC DIASTOLIC HEART FAILURE (HCC): ICD-10-CM

## 2024-09-26 LAB
ANION GAP SERPL CALC-SCNC: 4 MMOL/L (ref 2–12)
BUN SERPL-MCNC: 28 MG/DL (ref 6–20)
BUN/CREAT SERPL: 30 (ref 12–20)
CALCIUM SERPL-MCNC: 9.7 MG/DL (ref 8.5–10.1)
CHLORIDE SERPL-SCNC: 105 MMOL/L (ref 97–108)
CO2 SERPL-SCNC: 35 MMOL/L (ref 21–32)
CREAT SERPL-MCNC: 0.93 MG/DL (ref 0.55–1.02)
GLUCOSE SERPL-MCNC: 107 MG/DL (ref 65–100)
NT PRO BNP: 5057 PG/ML
POTASSIUM SERPL-SCNC: 3.4 MMOL/L (ref 3.5–5.1)
SODIUM SERPL-SCNC: 144 MMOL/L (ref 136–145)

## 2024-09-26 PROCEDURE — 1123F ACP DISCUSS/DSCN MKR DOCD: CPT | Performed by: INTERNAL MEDICINE

## 2024-09-26 PROCEDURE — 1090F PRES/ABSN URINE INCON ASSESS: CPT | Performed by: INTERNAL MEDICINE

## 2024-09-26 PROCEDURE — G8427 DOCREV CUR MEDS BY ELIG CLIN: HCPCS | Performed by: INTERNAL MEDICINE

## 2024-09-26 PROCEDURE — 1036F TOBACCO NON-USER: CPT | Performed by: INTERNAL MEDICINE

## 2024-09-26 PROCEDURE — G8399 PT W/DXA RESULTS DOCUMENT: HCPCS | Performed by: INTERNAL MEDICINE

## 2024-09-26 PROCEDURE — 3078F DIAST BP <80 MM HG: CPT | Performed by: INTERNAL MEDICINE

## 2024-09-26 PROCEDURE — G8419 CALC BMI OUT NRM PARAM NOF/U: HCPCS | Performed by: INTERNAL MEDICINE

## 2024-09-26 PROCEDURE — 3074F SYST BP LT 130 MM HG: CPT | Performed by: INTERNAL MEDICINE

## 2024-09-26 PROCEDURE — 99214 OFFICE O/P EST MOD 30 MIN: CPT | Performed by: INTERNAL MEDICINE

## 2024-09-26 ASSESSMENT — PATIENT HEALTH QUESTIONNAIRE - PHQ9
SUM OF ALL RESPONSES TO PHQ QUESTIONS 1-9: 0
SUM OF ALL RESPONSES TO PHQ QUESTIONS 1-9: 0
SUM OF ALL RESPONSES TO PHQ9 QUESTIONS 1 & 2: 0
2. FEELING DOWN, DEPRESSED OR HOPELESS: NOT AT ALL
SUM OF ALL RESPONSES TO PHQ QUESTIONS 1-9: 0
SUM OF ALL RESPONSES TO PHQ QUESTIONS 1-9: 0
1. LITTLE INTEREST OR PLEASURE IN DOING THINGS: NOT AT ALL

## 2024-09-27 ENCOUNTER — TELEPHONE (OUTPATIENT)
Age: 79
End: 2024-09-27

## 2024-09-27 DIAGNOSIS — I50.32 CHRONIC DIASTOLIC HEART FAILURE (HCC): Primary | ICD-10-CM

## 2024-09-27 RX ORDER — SPIRONOLACTONE 25 MG/1
25 TABLET ORAL DAILY
Qty: 30 TABLET | Refills: 2 | Status: SHIPPED | OUTPATIENT
Start: 2024-09-27

## 2024-09-27 NOTE — TELEPHONE ENCOUNTER
----- Message from Dr. Ricki Leung MD sent at 9/27/2024 11:12 AM EDT -----  Potassium is still low and proBNP is elevated on labs from yesterday. Have her keep taking Lasix the way she is doing. I want to add Spironolactone 25 mg daily. Have her repeat BMP and BNP in 2 weeks    9/27     Called patient with no answer. Left  with [facility] call back number. Will await return call.       Spoke with patient using two patient identifiers. Advised patient on above information per Dr. Leung. Patient states understanding of results and instructions. Patient will begin spironolactone 25mg daily. Patient also states she will have labs completed at [email or web address]. Patient had no further questions.       Labs faxed with fax confirmation.

## 2024-09-30 RX ORDER — POTASSIUM CHLORIDE 750 MG/1
TABLET, EXTENDED RELEASE ORAL
Qty: 180 TABLET | Refills: 1 | Status: SHIPPED | OUTPATIENT
Start: 2024-09-30

## 2024-09-30 NOTE — TELEPHONE ENCOUNTER
Requested Prescriptions     Pending Prescriptions Disp Refills    KLOR-CON M10 10 MEQ extended release tablet [Pharmacy Med Name: KLOR-CON M10 TABLET] 180 tablet 1     Sig: TAKE 1 TABLET BY MOUTH DAILY TAKE ADDITIONAL 1 TAB DAILY WHEN TAKING LASIX AS NEEDED     Last visit 9/26  Next visit 3/26/25

## 2024-10-11 DIAGNOSIS — I50.32 CHRONIC DIASTOLIC HEART FAILURE (HCC): ICD-10-CM

## 2024-10-19 LAB
BUN SERPL-MCNC: 22 MG/DL (ref 8–27)
BUN/CREAT SERPL: 23 (ref 12–28)
CALCIUM SERPL-MCNC: 9.4 MG/DL (ref 8.7–10.3)
CHLORIDE SERPL-SCNC: 100 MMOL/L (ref 96–106)
CO2 SERPL-SCNC: 27 MMOL/L (ref 20–29)
CREAT SERPL-MCNC: 0.95 MG/DL (ref 0.57–1)
EGFRCR SERPLBLD CKD-EPI 2021: 60 ML/MIN/1.73
GLUCOSE SERPL-MCNC: 98 MG/DL (ref 70–99)
NT-PROBNP SERPL-MCNC: 3545 PG/ML (ref 0–738)
POTASSIUM SERPL-SCNC: 4.3 MMOL/L (ref 3.5–5.2)
SODIUM SERPL-SCNC: 143 MMOL/L (ref 134–144)

## 2024-10-21 RX ORDER — SPIRONOLACTONE 25 MG/1
25 TABLET ORAL DAILY
Qty: 90 TABLET | OUTPATIENT
Start: 2024-10-21

## 2024-12-12 PROBLEM — D69.2 SENILE PURPURA (HCC): Status: ACTIVE | Noted: 2024-12-12

## 2024-12-12 PROBLEM — E11.69 TYPE 2 DIABETES MELLITUS WITH OTHER SPECIFIED COMPLICATION (HCC): Status: ACTIVE | Noted: 2018-05-17

## 2024-12-12 PROBLEM — I27.20 PULMONARY HYPERTENSION (HCC): Status: ACTIVE | Noted: 2024-12-12

## 2024-12-12 PROBLEM — D68.9 COAGULATION DEFECT (HCC): Status: ACTIVE | Noted: 2024-12-12

## 2024-12-12 PROBLEM — F11.20 CONTINUOUS OPIOID DEPENDENCE (HCC): Status: ACTIVE | Noted: 2024-12-12

## 2024-12-26 ENCOUNTER — TRANSCRIBE ORDERS (OUTPATIENT)
Facility: HOSPITAL | Age: 79
End: 2024-12-26

## 2024-12-26 DIAGNOSIS — Z12.31 ENCOUNTER FOR SCREENING MAMMOGRAM FOR MALIGNANT NEOPLASM OF BREAST: Primary | ICD-10-CM

## 2025-01-15 ENCOUNTER — HOSPITAL ENCOUNTER (OUTPATIENT)
Facility: HOSPITAL | Age: 80
Discharge: HOME | End: 2025-01-18
Attending: FAMILY MEDICINE
Payer: MEDICARE

## 2025-01-15 VITALS — WEIGHT: 160 LBS | HEIGHT: 64 IN | BODY MASS INDEX: 27.31 KG/M2

## 2025-01-15 DIAGNOSIS — Z12.31 ENCOUNTER FOR SCREENING MAMMOGRAM FOR MALIGNANT NEOPLASM OF BREAST: ICD-10-CM

## 2025-01-15 DIAGNOSIS — M81.0 AGE-RELATED OSTEOPOROSIS WITHOUT CURRENT PATHOLOGICAL FRACTURE: ICD-10-CM

## 2025-01-15 PROCEDURE — 77080 DXA BONE DENSITY AXIAL: CPT

## 2025-01-15 PROCEDURE — 77063 BREAST TOMOSYNTHESIS BI: CPT

## 2025-01-20 RX ORDER — POTASSIUM CHLORIDE 750 MG/1
TABLET, EXTENDED RELEASE ORAL
Qty: 180 TABLET | Refills: 0 | Status: SHIPPED | OUTPATIENT
Start: 2025-01-20

## 2025-01-20 NOTE — TELEPHONE ENCOUNTER
Requested Prescriptions     Pending Prescriptions Disp Refills    potassium chloride (KLOR-CON M10) 10 MEQ extended release tablet [Pharmacy Med Name: KLOR-CON M10 TABLET] 180 tablet 0     Sig: TAKE 1 TABLET BY MOUTH DAILY TAKE ADDITIONAL 1 TAB DAILY WHEN TAKING LASIX AS NEEDED     Last visit 9/20  Next visit 3/26

## 2025-02-10 ENCOUNTER — TELEPHONE (OUTPATIENT)
Age: 80
End: 2025-02-10

## 2025-02-10 NOTE — TELEPHONE ENCOUNTER
Patient called office and left voicemail to reschedule her appointment on  02/11/2025 due to the appointment change to VV. Returned patient's call and left voicemail to call office to schedule.

## 2025-02-11 ENCOUNTER — TELEPHONE (OUTPATIENT)
Age: 80
End: 2025-02-11

## 2025-02-11 NOTE — TELEPHONE ENCOUNTER
Attempted to contact patient to triage for upcoming VV. LVM informing I would call back at around 2pm to attempt to triage again.

## 2025-02-25 ENCOUNTER — OFFICE VISIT (OUTPATIENT)
Age: 80
End: 2025-02-25
Payer: MEDICARE

## 2025-02-25 VITALS
HEIGHT: 64 IN | WEIGHT: 156.6 LBS | HEART RATE: 79 BPM | BODY MASS INDEX: 26.73 KG/M2 | SYSTOLIC BLOOD PRESSURE: 116 MMHG | TEMPERATURE: 97.8 F | OXYGEN SATURATION: 98 % | DIASTOLIC BLOOD PRESSURE: 66 MMHG

## 2025-02-25 DIAGNOSIS — I10 PRIMARY HYPERTENSION: ICD-10-CM

## 2025-02-25 DIAGNOSIS — I48.0 PAROXYSMAL ATRIAL FIBRILLATION (HCC): ICD-10-CM

## 2025-02-25 DIAGNOSIS — G47.33 OSA (OBSTRUCTIVE SLEEP APNEA): Primary | ICD-10-CM

## 2025-02-25 PROCEDURE — G8419 CALC BMI OUT NRM PARAM NOF/U: HCPCS | Performed by: INTERNAL MEDICINE

## 2025-02-25 PROCEDURE — 1036F TOBACCO NON-USER: CPT | Performed by: INTERNAL MEDICINE

## 2025-02-25 PROCEDURE — 3074F SYST BP LT 130 MM HG: CPT | Performed by: INTERNAL MEDICINE

## 2025-02-25 PROCEDURE — G8427 DOCREV CUR MEDS BY ELIG CLIN: HCPCS | Performed by: INTERNAL MEDICINE

## 2025-02-25 PROCEDURE — 3078F DIAST BP <80 MM HG: CPT | Performed by: INTERNAL MEDICINE

## 2025-02-25 PROCEDURE — 1123F ACP DISCUSS/DSCN MKR DOCD: CPT | Performed by: INTERNAL MEDICINE

## 2025-02-25 PROCEDURE — 99213 OFFICE O/P EST LOW 20 MIN: CPT | Performed by: INTERNAL MEDICINE

## 2025-02-25 PROCEDURE — 1090F PRES/ABSN URINE INCON ASSESS: CPT | Performed by: INTERNAL MEDICINE

## 2025-02-25 PROCEDURE — G8399 PT W/DXA RESULTS DOCUMENT: HCPCS | Performed by: INTERNAL MEDICINE

## 2025-02-25 ASSESSMENT — SLEEP AND FATIGUE QUESTIONNAIRES
HOW LIKELY ARE YOU TO NOD OFF OR FALL ASLEEP WHILE SITTING AND TALKING TO SOMEONE: SLIGHT CHANCE OF DOZING
HOW LIKELY ARE YOU TO NOD OFF OR FALL ASLEEP WHILE SITTING QUIETLY AFTER LUNCH WITHOUT ALCOHOL: WOULD NEVER DOZE
ESS TOTAL SCORE: 6
HOW LIKELY ARE YOU TO NOD OFF OR FALL ASLEEP WHILE WATCHING TV: MODERATE CHANCE OF DOZING
HOW LIKELY ARE YOU TO NOD OFF OR FALL ASLEEP WHILE SITTING AND READING: SLIGHT CHANCE OF DOZING
HOW LIKELY ARE YOU TO NOD OFF OR FALL ASLEEP IN A CAR, WHILE STOPPED FOR A FEW MINUTES IN TRAFFIC: WOULD NEVER DOZE
HOW LIKELY ARE YOU TO NOD OFF OR FALL ASLEEP WHILE SITTING INACTIVE IN A PUBLIC PLACE: WOULD NEVER DOZE
HOW LIKELY ARE YOU TO NOD OFF OR FALL ASLEEP WHEN YOU ARE A PASSENGER IN A CAR FOR AN HOUR WITHOUT A BREAK: WOULD NEVER DOZE
HOW LIKELY ARE YOU TO NOD OFF OR FALL ASLEEP WHILE LYING DOWN TO REST IN THE AFTERNOON WHEN CIRCUMSTANCES PERMIT: MODERATE CHANCE OF DOZING

## 2025-02-25 NOTE — PROGRESS NOTES
[address] [place], [place] [address]  Tel.  [phone]    Fax. [phone]     [address] [place] [address]  Tel.  [phone]    Fax. [phone] [address] [place] [address]  Tel.  [phone]    Fax. [phone]       Joana Erickson (: 1945) is a 79 y.o. female, established patient, seen for positive airway pressure follow-up and evaluation of the following chief complaint(s):   Sleep Problem (1st Adherence )       Joana Erickson was last seen by me on 24, prior notes reviewed in detail.  Attended nocturnal polysomnography (NPSG) performed on 2011 showed an AHI of 5.0/hr with a lowest SpO2 of 73%. Weight at time of testing 180 lbs.     Repeat Attended nocturnal polysomnography (NPSG) performed on 24 showed an AHI of 0.0/hr with a lowest SpO2 of 90%, duration of SpO2 < 88% 0.0 minutes. Sleep Efficiency 15%. There were 115 PLMS with a PLMS Index of 87.9/hour. 9 PLMs arousals with a PLMS arousal index of 6.9/hour. Weight 160 pounds.    WatchPAT Testing performed 24 was indicative of an average pAHI 3% of 19.5 and pAHI 4% of 7.5 per hour.  SpO2 sarita was 86% and SpO2 of < 88% was 2.2 minutes.  She returned for a CPAP titration and prescribed CPAP at 09-12 cmH2O.    ASSESSMENT/PLAN:   Diagnosis Orders   1. HAYDEN (obstructive sleep apnea)  DME Order for (Specify) as OP      2. Paroxysmal atrial fibrillation (HCC)        3. Primary hypertension        4. BMI 26.0-26.9,adult            On ResMed:  AirSense 10 AutoSet    Settings:  Min EPAP:  09 cmH2O  Max EPAP: 12 cmH2O    EPR: 3    Sleep Apnea -   * She is adherent with PAP therapy and PAP continues to benefit patient and remains necessary for control of her sleep apnea. Continue on current pressures.    Patient did not have her mask available for review and fitting. Order for mask fitting placed to DME company.    * Supplies for his device were ordered as noted

## 2025-02-25 NOTE — PATIENT INSTRUCTIONS
[address] [place]  [place] [address]  Tel.  [phone]  Fax. [phone] [address] [place]  [place] [address]  Tel.  [phone]  Fax. [phone] [address] [place] [address]  Tel.  [phone]  Fax. [phone]     Learning About CPAP for Sleep Apnea  What is CPAP?              CPAP is a small machine that you use at home every night while you sleep. It increases air pressure in your throat to keep your airway open. When you have sleep apnea, this can help you sleep better so you feel much better. CPAP stands for \"continuous positive airway pressure.\"  The CPAP machine will have one of the following:  A mask that covers your nose and mouth  Prongs that fit into your nose  A mask that covers your nose only, the most common type. This type is called NCPAP. The N stands for \"nasal.\"  Why is it done?  CPAP is usually the best treatment for obstructive sleep apnea. It is the first treatment choice and the most widely used. Your doctor may suggest CPAP if you have:  Moderate to severe sleep apnea.  Sleep apnea and coronary artery disease (CAD) or heart failure.  How does it help?  CPAP can help you have more normal sleep, so you feel less sleepy and more alert during the daytime.  CPAP may help keep heart failure or other heart problems from getting worse.  NCPAP may help lower your blood pressure.  If you use CPAP, your bed partner may also sleep better because you are not snoring or restless.  What are the side effects?  Some people who use CPAP have:  A dry or stuffy nose and a sore throat.  Irritated skin on the face.  Sore eyes.  Bloating.  If you have any of these problems, work with your doctor to fix them. Here are some things you can try:  Be sure the mask or nasal prongs fit well.  See if your doctor can adjust the pressure of your CPAP.  If your nose is dry, try a humidifier.  If your nose is runny or stuffy, try decongestant medicine or a steroid

## 2025-02-28 ENCOUNTER — CLINICAL DOCUMENTATION (OUTPATIENT)
Age: 80
End: 2025-02-28

## 2025-03-05 ENCOUNTER — OFFICE VISIT (OUTPATIENT)
Age: 80
End: 2025-03-05
Payer: MEDICARE

## 2025-03-05 VITALS
OXYGEN SATURATION: 95 % | HEIGHT: 64 IN | WEIGHT: 156 LBS | SYSTOLIC BLOOD PRESSURE: 120 MMHG | DIASTOLIC BLOOD PRESSURE: 80 MMHG | HEART RATE: 78 BPM | RESPIRATION RATE: 16 BRPM | BODY MASS INDEX: 26.63 KG/M2

## 2025-03-05 DIAGNOSIS — I48.20 ATRIAL FIBRILLATION, CHRONIC (HCC): ICD-10-CM

## 2025-03-05 DIAGNOSIS — E78.00 HYPERCHOLESTEREMIA: ICD-10-CM

## 2025-03-05 DIAGNOSIS — I05.9 MITRAL VALVE DISORDER: ICD-10-CM

## 2025-03-05 DIAGNOSIS — I42.2 HYPERTROPHIC CARDIOMYOPATHY (HCC): Primary | ICD-10-CM

## 2025-03-05 DIAGNOSIS — I10 ESSENTIAL HYPERTENSION: ICD-10-CM

## 2025-03-05 DIAGNOSIS — I50.32 CHRONIC DIASTOLIC HEART FAILURE (HCC): ICD-10-CM

## 2025-03-05 PROCEDURE — 1123F ACP DISCUSS/DSCN MKR DOCD: CPT | Performed by: SPECIALIST

## 2025-03-05 PROCEDURE — 93010 ELECTROCARDIOGRAM REPORT: CPT | Performed by: SPECIALIST

## 2025-03-05 PROCEDURE — 3074F SYST BP LT 130 MM HG: CPT | Performed by: SPECIALIST

## 2025-03-05 PROCEDURE — 1090F PRES/ABSN URINE INCON ASSESS: CPT | Performed by: SPECIALIST

## 2025-03-05 PROCEDURE — G8427 DOCREV CUR MEDS BY ELIG CLIN: HCPCS | Performed by: SPECIALIST

## 2025-03-05 PROCEDURE — 99214 OFFICE O/P EST MOD 30 MIN: CPT | Performed by: SPECIALIST

## 2025-03-05 PROCEDURE — 3079F DIAST BP 80-89 MM HG: CPT | Performed by: SPECIALIST

## 2025-03-05 PROCEDURE — 1036F TOBACCO NON-USER: CPT | Performed by: SPECIALIST

## 2025-03-05 PROCEDURE — 1126F AMNT PAIN NOTED NONE PRSNT: CPT | Performed by: SPECIALIST

## 2025-03-05 PROCEDURE — G8399 PT W/DXA RESULTS DOCUMENT: HCPCS | Performed by: SPECIALIST

## 2025-03-05 PROCEDURE — 93005 ELECTROCARDIOGRAM TRACING: CPT | Performed by: SPECIALIST

## 2025-03-05 PROCEDURE — G8419 CALC BMI OUT NRM PARAM NOF/U: HCPCS | Performed by: SPECIALIST

## 2025-03-05 PROCEDURE — 1159F MED LIST DOCD IN RCRD: CPT | Performed by: SPECIALIST

## 2025-03-05 ASSESSMENT — PATIENT HEALTH QUESTIONNAIRE - PHQ9
2. FEELING DOWN, DEPRESSED OR HOPELESS: NOT AT ALL
SUM OF ALL RESPONSES TO PHQ QUESTIONS 1-9: 0
1. LITTLE INTEREST OR PLEASURE IN DOING THINGS: NOT AT ALL

## 2025-03-05 NOTE — PROGRESS NOTES
Joana Erickson     1945       Galindo Rojas MD, [facility]  Date of Visit-3/5/2025   PCP is Masters, Melonie CASTREJON MD   [facility] [place]  [facility]  HPI:  Joana Erickson is a 79 y.o. female   6-month visit  Hypertrophic cardiomyopathy, chronic atrial fibrillation, chronic diastolic CHF, HTN, mitral and tricuspid insufficiency, hypercholesterolemia, CKD 3, DM2  Cardiac MRI October 2023 showing nonobstructive HOCM     Today says she did not sleep well because she is nervous about the appointment.  Overall seems to be doing fairly well.  Has had blood work BNP BMP reviewed, I am not sure there is much actionable on the BNP  Mild edema ankles which she says is intermittent and does not bother her.  She takes a diuretic intermittently  No sig sob, able to do her anticipated activities for her age she feels she is doing well for her age  Denies chest pain, syncope or shortness of breath at rest   Has no tachycardia , palpitations or sense of arrythmia   Last labs and echo reviewed     Atrial fibrillation , chronic   Hypertrophic cardiomyopathy   Diastolic dysfunction causing SOB    Echo 6/27/2022 EF 55 to 60% apical variant hypertrophic cardiomyopathy which extends mainly from the apex or tip of the heart but then back to the rest of the heart, reduced RV function with TAPSE of 1.4 mild to moderate MR moderate to severe TR PASP is 53 dilated atria  Echo 7/17/2023.  EF 60-65% severe increase in thickness the apex mid left ventricle moderate thickness at the base of the LV at 1.3.  Basal segment spared.  Consistent with hypertrophic cardiomyopathy.  Severe TR.  TAPSE 1.4.  Mild MR.  Severe biatrial enlargement  Cardiac MRI 10/3/2023-hypertrophic cardiomyopathy severe hypertrophy mid segment.  Mid septal wall 25 mm mid lateral wall 28 mm mid systolic cavity obliteration apical hypokinesia EF 60% RVEF 60% severe 3+ TR 1+ MR myocardial scar is 18% dilated low atria.      EKG-

## 2025-03-24 ENCOUNTER — TELEPHONE (OUTPATIENT)
Age: 80
End: 2025-03-24

## 2025-03-25 NOTE — PROGRESS NOTES
[facility]  [facility] in [place], [place]  Heart Failure Outpatient Clinic Note    Patient name: Joana Erickson  Patient : 1945  Patient MRN: [id number]  Date of service: 25    Primary care physician: Melonie Mendoza MD  Primary cardiologist: Galindo Rojas MD  Primary F cardiologist: Ángel Leung MD      Chief Complaint   Patient presents with    Edema     In legs and ankles        ASSESSMENT:  Joana Erickson is a 79 y.o. female with a history of chronic diastolic heart failure and hypertrophic cardiomyopathy with mid wall hypertrophy and mid cavity obliteration by MRI.      Interval events:  Joana Erickson presents for HF follow up. She states she feels well overall, she notes some edema in paula ankles and feet but denies SOB, dizziness, palpitations, abdominal distension or changes in appetite. She has only been taking her lasix weekly and is not wearing her compression socks today.   VS reviewed- stable, wt not significantly changed  Labs none recent  TTE - none recent       PLAN:  Chronic diastolic heart failure/Hypertrophic cardiomyopathy:  Beta Blocker: Continue Atenolol 50 mg daily  Volume: Increase lasix to 3x/week- previously had done well with this dose   Overdue for repeat echo, patient to schedule next available  HF labs in clinic today     SCD risk:  Personal history of unexplained syncope, sustained ventricular arrhythmias or cardiac arrest: No   Family history of SCD: no  LVEF </= 50%: no  Wall thickness >/= 30 mm: no  LV apical aneurysm present: no  NSVT on ambulatory monitoring: not performed  LGE is >/= 15% of LV mass: no    PAF:  Rate control with Atenolol  Anticoagulation with Apixaban    HTN:  Normotensive without symptomatic hypotension. No medication changes today    HAYDEN:  Continue CPAP therapy    Advanced Care Plan forms provided today.   Recommend flu, covid and pneumonia vaccinations  Follow-up with primary cardiologist    Return in about

## 2025-03-26 ENCOUNTER — OFFICE VISIT (OUTPATIENT)
Age: 80
End: 2025-03-26
Payer: MEDICARE

## 2025-03-26 VITALS
WEIGHT: 157.8 LBS | TEMPERATURE: 97.3 F | HEIGHT: 64 IN | OXYGEN SATURATION: 97 % | HEART RATE: 73 BPM | BODY MASS INDEX: 26.94 KG/M2 | SYSTOLIC BLOOD PRESSURE: 144 MMHG | DIASTOLIC BLOOD PRESSURE: 70 MMHG | RESPIRATION RATE: 20 BRPM

## 2025-03-26 DIAGNOSIS — I42.2 HYPERTROPHIC CARDIOMYOPATHY (HCC): Primary | ICD-10-CM

## 2025-03-26 DIAGNOSIS — Z51.81 ENCOUNTER FOR MONITORING DIURETIC THERAPY: ICD-10-CM

## 2025-03-26 DIAGNOSIS — R60.0 FLUID RETENTION IN LEGS: ICD-10-CM

## 2025-03-26 DIAGNOSIS — I50.32 CHRONIC DIASTOLIC HEART FAILURE (HCC): ICD-10-CM

## 2025-03-26 DIAGNOSIS — Z79.899 ENCOUNTER FOR MONITORING DIURETIC THERAPY: ICD-10-CM

## 2025-03-26 PROCEDURE — 1036F TOBACCO NON-USER: CPT | Performed by: NURSE PRACTITIONER

## 2025-03-26 PROCEDURE — 99214 OFFICE O/P EST MOD 30 MIN: CPT | Performed by: NURSE PRACTITIONER

## 2025-03-26 PROCEDURE — 3078F DIAST BP <80 MM HG: CPT | Performed by: NURSE PRACTITIONER

## 2025-03-26 PROCEDURE — G8419 CALC BMI OUT NRM PARAM NOF/U: HCPCS | Performed by: NURSE PRACTITIONER

## 2025-03-26 PROCEDURE — 3077F SYST BP >= 140 MM HG: CPT | Performed by: NURSE PRACTITIONER

## 2025-03-26 PROCEDURE — G8427 DOCREV CUR MEDS BY ELIG CLIN: HCPCS | Performed by: NURSE PRACTITIONER

## 2025-03-26 PROCEDURE — G8399 PT W/DXA RESULTS DOCUMENT: HCPCS | Performed by: NURSE PRACTITIONER

## 2025-03-26 PROCEDURE — 1123F ACP DISCUSS/DSCN MKR DOCD: CPT | Performed by: NURSE PRACTITIONER

## 2025-03-26 PROCEDURE — 1090F PRES/ABSN URINE INCON ASSESS: CPT | Performed by: NURSE PRACTITIONER

## 2025-03-26 ASSESSMENT — PATIENT HEALTH QUESTIONNAIRE - PHQ9
SUM OF ALL RESPONSES TO PHQ QUESTIONS 1-9: 1
2. FEELING DOWN, DEPRESSED OR HOPELESS: SEVERAL DAYS
SUM OF ALL RESPONSES TO PHQ QUESTIONS 1-9: 1
SUM OF ALL RESPONSES TO PHQ QUESTIONS 1-9: 1
1. LITTLE INTEREST OR PLEASURE IN DOING THINGS: NOT AT ALL
SUM OF ALL RESPONSES TO PHQ QUESTIONS 1-9: 1

## 2025-03-26 ASSESSMENT — ENCOUNTER SYMPTOMS
NAUSEA: 0
CHOKING: 0
VOMITING: 0
COUGH: 0
SHORTNESS OF BREATH: 0

## 2025-03-26 NOTE — PATIENT INSTRUCTIONS
Medication changes:    Increase lasix to 3 times a week    Contact [facility] in 1 month with your daily weights, blood pressures and any symptoms. If you leave a voicemail, please report this information in detail so we can follow up appropriately and efficiently. Be reassured, we will call you back. Phone number is [phone] option 2    Testing Ordered:    Labs today in clinic    Attended scheduled echo on __________ at ___________    Other Recommendations:      - Record blood pressure and heart rate daily before medication and two hours after medication  - Record weight daily upon waking/after using the bathroom.   - Keep a written records of your weights and blood pressure and bring to your next appointment.   - Call the clinic at if you have a weight gain of 3 or more pounds overnight OR 5 or more pounds in one week, new/worsened shortness of breath or swelling, or if your blood pressure begins to consistently run below 90/60 and/or you begin to experience dizziness or lightheadedness. Our office phone number [phone] option 2.  - Ensure you are drinking an adequate amount of water with a goal of 6-8 eight ounce glasses (1.5-2 liters) of fluid daily. Your urine should be clear and light yellow straw colored.       Follow up in 6 months with [facility]    Our monthly Heart Failure Support Group is held on the last Wednesday of every month from 5-6pm at [facility]. If you would like to attend, please RSVP to [email or web address]    Thank you for allowing us the privilege of being a part of your healthcare team! Please do not hesitate to contact our office at [phone] option 2 with any questions or concerns.

## 2025-03-27 ENCOUNTER — RESULTS FOLLOW-UP (OUTPATIENT)
Age: 80
End: 2025-03-27

## 2025-03-27 LAB
ALBUMIN SERPL-MCNC: 4 G/DL (ref 3.5–5)
ALBUMIN/GLOB SERPL: 1.4 (ref 1.1–2.2)
ALP SERPL-CCNC: 76 U/L (ref 45–117)
ALT SERPL-CCNC: 15 U/L (ref 12–78)
ANION GAP SERPL CALC-SCNC: 6 MMOL/L (ref 2–12)
AST SERPL-CCNC: 23 U/L (ref 15–37)
BILIRUB SERPL-MCNC: 0.9 MG/DL (ref 0.2–1)
BUN SERPL-MCNC: 29 MG/DL (ref 6–20)
BUN/CREAT SERPL: 26 (ref 12–20)
CALCIUM SERPL-MCNC: 9.8 MG/DL (ref 8.5–10.1)
CHLORIDE SERPL-SCNC: 104 MMOL/L (ref 97–108)
CO2 SERPL-SCNC: 30 MMOL/L (ref 21–32)
CREAT SERPL-MCNC: 1.1 MG/DL (ref 0.55–1.02)
GLOBULIN SER CALC-MCNC: 2.8 G/DL (ref 2–4)
GLUCOSE SERPL-MCNC: 87 MG/DL (ref 65–100)
MAGNESIUM SERPL-MCNC: 2 MG/DL (ref 1.6–2.4)
NT PRO BNP: 2371 PG/ML
POTASSIUM SERPL-SCNC: 4.3 MMOL/L (ref 3.5–5.1)
PROT SERPL-MCNC: 6.8 G/DL (ref 6.4–8.2)
SODIUM SERPL-SCNC: 140 MMOL/L (ref 136–145)

## 2025-03-27 NOTE — TELEPHONE ENCOUNTER
----- Message from NANCY Silva - NP sent at 3/27/2025 11:38 AM EDT -----  Labs look good, kidney function is around her baseline and her PBNP is trending down.    3/27- 12pm--Called patient regarding above results, Patient verbalized understanding. Patient has no further questions.

## 2025-04-08 NOTE — TELEPHONE ENCOUNTER
Requested Prescriptions     Signed Prescriptions Disp Refills    atenoloL (TENORMIN) 25 mg tablet 90 Tab 0     Sig: Take 1 Tab by mouth daily.  Please schedule follow up with Dr. Kendra Sanchez.     Authorizing Provider: Valerio Davison     Ordering User: Leigh Brown     Verbal order per Dr. Kendra Sanchez.
Yes

## 2025-06-03 ENCOUNTER — HOSPITAL ENCOUNTER (OUTPATIENT)
Facility: HOSPITAL | Age: 80
Discharge: HOME | End: 2025-06-05
Payer: MEDICARE

## 2025-06-03 DIAGNOSIS — I50.32 CHRONIC DIASTOLIC HEART FAILURE (HCC): ICD-10-CM

## 2025-06-03 DIAGNOSIS — I42.2 HYPERTROPHIC CARDIOMYOPATHY (HCC): ICD-10-CM

## 2025-06-03 LAB
ECHO AO ASC DIAM: 3.3 CM
ECHO AO ROOT DIAM: 2.7 CM
ECHO AV AREA PEAK VELOCITY: 2.1 CM2
ECHO AV AREA VTI: 2.1 CM2
ECHO AV MEAN GRADIENT: 6 MMHG
ECHO AV MEAN VELOCITY: 1.2 M/S
ECHO AV PEAK GRADIENT: 13 MMHG
ECHO AV PEAK VELOCITY: 1.8 M/S
ECHO AV VELOCITY RATIO: 0.67
ECHO AV VTI: 32.4 CM
ECHO EST RA PRESSURE: 15 MMHG
ECHO LA DIAMETER: 6.4 CM
ECHO LA TO AORTIC ROOT RATIO: 2.37
ECHO LA VOL A-L A2C: 408 ML (ref 22–52)
ECHO LA VOL A-L A4C: 352 ML (ref 22–52)
ECHO LA VOL MOD A2C: 389 ML (ref 22–52)
ECHO LA VOL MOD A4C: 334 ML (ref 22–52)
ECHO LA VOLUME AREA LENGTH: 386 ML
ECHO LV EF PHYSICIAN: 65 %
ECHO LV FRACTIONAL SHORTENING: 40 % (ref 28–44)
ECHO LV INTERNAL DIMENSION DIASTOLIC: 5.2 CM (ref 3.9–5.3)
ECHO LV INTERNAL DIMENSION SYSTOLIC: 3.1 CM
ECHO LV IVSD: 1 CM (ref 0.6–0.9)
ECHO LV MASS 2D: 194.2 G (ref 67–162)
ECHO LV POSTERIOR WALL DIASTOLIC: 1 CM (ref 0.6–0.9)
ECHO LV RELATIVE WALL THICKNESS RATIO: 0.38
ECHO LVOT AREA: 3.1 CM2
ECHO LVOT AV VTI INDEX: 0.69
ECHO LVOT DIAM: 2 CM
ECHO LVOT MEAN GRADIENT: 3 MMHG
ECHO LVOT PEAK GRADIENT: 6 MMHG
ECHO LVOT PEAK VELOCITY: 1.2 M/S
ECHO LVOT SV: 70 ML
ECHO LVOT VTI: 22.3 CM
ECHO MV AREA PHT: 4.4 CM2
ECHO MV AREA VTI: 5.8 CM2
ECHO MV LVOT VTI INDEX: 0.54
ECHO MV MAX VELOCITY: 0.8 M/S
ECHO MV MEAN GRADIENT: 1 MMHG
ECHO MV MEAN VELOCITY: 0.5 M/S
ECHO MV PEAK GRADIENT: 2 MMHG
ECHO MV PRESSURE HALF TIME (PHT): 50.2 MS
ECHO MV REGURGITANT PEAK GRADIENT: 100 MMHG
ECHO MV REGURGITANT PEAK VELOCITY: 5 M/S
ECHO MV VTI: 12.1 CM
ECHO PV MAX VELOCITY: 0.8 M/S
ECHO PV PEAK GRADIENT: 2 MMHG
ECHO RIGHT VENTRICULAR SYSTOLIC PRESSURE (RVSP): 63 MMHG
ECHO RV TAPSE: 0.8 CM (ref 1.7–?)
ECHO TV REGURGITANT MAX VELOCITY: 3.48 M/S
ECHO TV REGURGITANT PEAK GRADIENT: 48 MMHG

## 2025-06-03 PROCEDURE — 93306 TTE W/DOPPLER COMPLETE: CPT | Performed by: INTERNAL MEDICINE

## 2025-06-03 PROCEDURE — 93306 TTE W/DOPPLER COMPLETE: CPT
